# Patient Record
Sex: MALE | Race: WHITE | Employment: OTHER | URBAN - METROPOLITAN AREA
[De-identification: names, ages, dates, MRNs, and addresses within clinical notes are randomized per-mention and may not be internally consistent; named-entity substitution may affect disease eponyms.]

---

## 2019-02-15 ENCOUNTER — HOSPITAL ENCOUNTER (OUTPATIENT)
Age: 84
Discharge: HOME | End: 2019-02-17
Payer: COMMERCIAL

## 2019-02-15 LAB
ALBUMIN SERPL-MCNC: 3.6 G/DL (ref 3.5–5.2)
ALP BLD-CCNC: 80 U/L (ref 40–129)
ALT SERPL-CCNC: 7 U/L (ref 0–40)
ANION GAP SERPL CALCULATED.3IONS-SCNC: 7 MMOL/L (ref 7–16)
AST SERPL-CCNC: 14 U/L (ref 0–39)
BASOPHILS ABSOLUTE: 0 E9/L (ref 0–0.2)
BASOPHILS RELATIVE PERCENT: 0.7 % (ref 0–2)
BILIRUB SERPL-MCNC: 0.4 MG/DL (ref 0–1.2)
BUN BLDV-MCNC: 18 MG/DL (ref 8–23)
BURR CELLS: ABNORMAL
CALCIUM SERPL-MCNC: 9 MG/DL (ref 8.6–10.2)
CHLORIDE BLD-SCNC: 100 MMOL/L (ref 98–107)
CO2: 36 MMOL/L (ref 22–29)
CREAT SERPL-MCNC: 0.9 MG/DL (ref 0.7–1.2)
EOSINOPHILS ABSOLUTE: 0.48 E9/L (ref 0.05–0.5)
EOSINOPHILS RELATIVE PERCENT: 7 % (ref 0–6)
GFR AFRICAN AMERICAN: >60
GFR NON-AFRICAN AMERICAN: >60 ML/MIN/1.73
GLUCOSE BLD-MCNC: 147 MG/DL (ref 74–99)
HCT VFR BLD CALC: 33.1 % (ref 37–54)
HEMOGLOBIN: 9.5 G/DL (ref 12.5–16.5)
HYPOCHROMIA: ABNORMAL
LYMPHOCYTES ABSOLUTE: 0.61 E9/L (ref 1.5–4)
LYMPHOCYTES RELATIVE PERCENT: 8.8 % (ref 20–42)
MCH RBC QN AUTO: 25.5 PG (ref 26–35)
MCHC RBC AUTO-ENTMCNC: 28.7 % (ref 32–34.5)
MCV RBC AUTO: 88.7 FL (ref 80–99.9)
MONOCYTES ABSOLUTE: 0.75 E9/L (ref 0.1–0.95)
MONOCYTES RELATIVE PERCENT: 10.5 % (ref 2–12)
NEUTROPHILS ABSOLUTE: 5.03 E9/L (ref 1.8–7.3)
NEUTROPHILS RELATIVE PERCENT: 73.7 % (ref 43–80)
OVALOCYTES: ABNORMAL
PDW BLD-RTO: 18.8 FL (ref 11.5–15)
PLATELET # BLD: 245 E9/L (ref 130–450)
PMV BLD AUTO: 9.5 FL (ref 7–12)
POIKILOCYTES: ABNORMAL
POLYCHROMASIA: ABNORMAL
POTASSIUM SERPL-SCNC: 4.3 MMOL/L (ref 3.5–5)
RBC # BLD: 3.73 E12/L (ref 3.8–5.8)
SCHISTOCYTES: ABNORMAL
SODIUM BLD-SCNC: 143 MMOL/L (ref 132–146)
TOTAL PROTEIN: 7 G/DL (ref 6.4–8.3)
WBC # BLD: 6.8 E9/L (ref 4.5–11.5)

## 2019-02-15 PROCEDURE — 83540 ASSAY OF IRON: CPT

## 2019-02-15 PROCEDURE — 85025 COMPLETE CBC W/AUTO DIFF WBC: CPT

## 2019-02-15 PROCEDURE — 80053 COMPREHEN METABOLIC PANEL: CPT

## 2019-02-19 LAB — IRON: 34 MCG/DL (ref 59–158)

## 2019-05-08 ENCOUNTER — HOSPITAL ENCOUNTER (OUTPATIENT)
Dept: WOUND CARE | Age: 84
Discharge: HOME | End: 2019-05-08
Payer: COMMERCIAL

## 2019-05-08 VITALS
SYSTOLIC BLOOD PRESSURE: 150 MMHG | TEMPERATURE: 98 F | WEIGHT: 208 LBS | HEIGHT: 69 IN | RESPIRATION RATE: 18 BRPM | BODY MASS INDEX: 30.81 KG/M2 | DIASTOLIC BLOOD PRESSURE: 68 MMHG | HEART RATE: 58 BPM

## 2019-05-08 DIAGNOSIS — S30.0XXS TRAUMATIC HEMATOMA OF LOWER BACK, SEQUELA: Primary | Chronic | ICD-10-CM

## 2019-05-08 DIAGNOSIS — L98.422 SKIN ULCER OF BACK WITH FAT LAYER EXPOSED (HCC): Chronic | ICD-10-CM

## 2019-05-08 PROBLEM — S30.0XXA TRAUMATIC HEMATOMA OF LOWER BACK: Chronic | Status: ACTIVE | Noted: 2019-05-08

## 2019-05-08 PROCEDURE — 87077 CULTURE AEROBIC IDENTIFY: CPT

## 2019-05-08 PROCEDURE — 87075 CULTR BACTERIA EXCEPT BLOOD: CPT

## 2019-05-08 PROCEDURE — 87070 CULTURE OTHR SPECIMN AEROBIC: CPT

## 2019-05-08 PROCEDURE — 11042 DBRDMT SUBQ TIS 1ST 20SQCM/<: CPT | Performed by: SURGERY

## 2019-05-08 PROCEDURE — 87186 SC STD MICRODIL/AGAR DIL: CPT

## 2019-05-08 PROCEDURE — 11042 DBRDMT SUBQ TIS 1ST 20SQCM/<: CPT

## 2019-05-08 PROCEDURE — 99203 OFFICE O/P NEW LOW 30 MIN: CPT | Performed by: SURGERY

## 2019-05-08 PROCEDURE — 99213 OFFICE O/P EST LOW 20 MIN: CPT

## 2019-05-08 RX ORDER — BUDESONIDE 0.5 MG/2ML
1 INHALANT ORAL 2 TIMES DAILY
COMMUNITY
End: 2019-08-20 | Stop reason: SDUPTHER

## 2019-05-08 RX ORDER — PANTOPRAZOLE SODIUM 40 MG/1
40 GRANULE, DELAYED RELEASE ORAL
COMMUNITY

## 2019-05-08 RX ORDER — METOPROLOL TARTRATE 100 MG/1
50 TABLET ORAL 2 TIMES DAILY
COMMUNITY

## 2019-05-08 RX ORDER — ATORVASTATIN CALCIUM 10 MG/1
10 TABLET, FILM COATED ORAL DAILY
COMMUNITY

## 2019-05-08 RX ORDER — LIDOCAINE HYDROCHLORIDE 20 MG/ML
JELLY TOPICAL ONCE
Status: DISCONTINUED | OUTPATIENT
Start: 2019-05-08 | End: 2019-05-09 | Stop reason: HOSPADM

## 2019-05-08 RX ORDER — LANOLIN ALCOHOL/MO/W.PET/CERES
3 CREAM (GRAM) TOPICAL NIGHTLY PRN
COMMUNITY

## 2019-05-08 RX ORDER — IPRATROPIUM BROMIDE AND ALBUTEROL SULFATE 2.5; .5 MG/3ML; MG/3ML
1 SOLUTION RESPIRATORY (INHALATION) 3 TIMES DAILY
COMMUNITY
End: 2020-02-11

## 2019-05-08 SDOH — HEALTH STABILITY: MENTAL HEALTH: HOW OFTEN DO YOU HAVE A DRINK CONTAINING ALCOHOL?: NEVER

## 2019-05-08 NOTE — PROGRESS NOTES
Wound Healing Center /Hyperbarics   History and Physical/Consultation  Vascular    Referring Physician : Val Rodriguez MD  [address] [place] [address] RECORD NUMBER:  [id number]  AGE: 80 y.o. GENDER: male  : 1931  EPISODE DATE:  2019  Subjective:     Chief Complaint   Patient presents with    Wound Check     mid back         HISTORY of PRESENT ILLNESS HPI     Abi Frank is a 80 y.o. male who presents today for wound/ulcer evaluation. History of Wound Context:  The patient has had a wound of back which was first noted approximately 2019. This has been treated local wound care at facility he lives in. It occurred after he fell. He was noted to have a hematoma and extensive ecchymosis initially. He is chronically on eliquis for hx of afib. He denies previous hx of similar wounds. He currently lives in assisted living at a facility. On their initial visit to the wound healing center, 19, the patient has noted that the wound has not been improving. The patient has not had similar previous wounds in the past.      Pt is currently not on abx.       Wound/Ulcer Pain Timing/Severity: none  Quality of pain: N/A  Severity:  0 / 10   Modifying Factors: None  Associated Signs/Symptoms: none    Ulcer Identification:  Ulcer Type: diabetic, pressure and traumatic  Contributing Factors: diabetes, chronic pressure, shear force and malnutrition    Diabetic/Pressure/Non Pressure Ulcers only:  Ulcer: Non-Pressure ulcer, fat layer exposed  Wound: Contusion        PAST MEDICAL HISTORY      Diagnosis Date    Atherosclerotic heart disease     Atrial fibrillation (HCC)     CAD (coronary artery disease)     COPD (chronic obstructive pulmonary disease) (HCC)     Diabetes mellitus ([address]     Hyperlipidemia     Hypertension     Major depressive disorder     Pulmonary hypertension ([address]     Skin ulcer of back with fat layer exposed ([address] 2019    Traumatic hematoma of lower back 2019 Rest Pain   Eyes Neurologic   [x] Wears Glasses/Contacts [] Stroke/TIA   [] Vision Changes [] Focal weakness   Respiratory [] Slurred Speech    [] Shortness of breath ENT   Cardiovascular [] Difficulty swallowing   [] Chest Pain Gastrointestinal   [x] Shortness of breath with exertion [] Abdominal Pain    [] Melena       [] Hematochezia               Objective:    BP (!) 150/68   Pulse 58   Temp 98 °F (36.7 °C) (Oral)   Resp 18   Ht 5' 9\" (1.753 m)   Wt 208 lb (94.3 kg)   BMI 30.72 kg/m²   Wt Readings from Last 3 Encounters:   05/08/19 208 lb (94.3 kg)     PHYSICAL EXAM  CONSTITUTIONAL:   Awake, alert, cooperative   PSYCHIATRIC :  Oriented to time, place and person      normal insight to disease process  ENT:  External ears and nose without lesions    Hearing deficits is not noted  NECK: Supple, symmetrical, trachea midline    Thyroid goiter not appreciated    Carotid bruit is not noted bilaterally  LUNGS:  No increased work of breathing                  Clear to auscultation bilaterally   CARDIOVASCULAR:  irregular rate and rhythm   ABDOMEN:  soft, non-distended, non-tender    Hernias is not noted   Aorta is not palpable   Lymphatics : Cervical lymphadenopathy is not noted   SKIN:   Skin color is abnormal with open wound of back   Texture and turgor is  normal   Induration is not noted  EXTREMITIES:   R UE Edema is not noted  L UE Edema is not noted  R LE Edema is  noted  L LE Edema is  noted    Assessment:     Problem List Items Addressed This Visit     Skin ulcer of back with fat layer exposed ([address] (Chronic)    Relevant Orders    Prealbumin    Comprehensive Metabolic Panel    Sedimentation rate, manual    C-Reactive Protein    Hemoglobin A1C    CBC with Differential    Traumatic hematoma of lower back - Primary (Chronic)        Procedure Note  Indications:  Based on my examination of this patient's wound(s)/ulcer(s) today, debridement is required to promote healing and evaluate the wound base.     Performed by: Darnell Banerjee MD    Consent obtained:  Yes    Time out taken:  Yes    Pain Control: Anesthetic  Anesthetic: 2% Lidocaine Gel Topical     Debridement:Excisional Debridement    Using curette the wound(s)/ulcer(s) was/were sharply debrided down through and including the removal of epidermis, dermis and subcutaneous tissue. Devitalized Tissue Debrided:  fibrin, biofilm, slough and exudate    Pre Debridement Measurements:  Are located in the [facility]  Documentation Flow Sheet    Wound/Ulcer #: 1    Post Debridement Measurements:  Wound/Ulcer Descriptions are Pre Debridement except measurements:    Wound 05/08/19 Back Mid;Lower #1 acquired 1/18/19 (Active)   Wound Image   5/8/2019 11:06 AM   Wound Length (cm) 2.4 cm 5/8/2019 11:06 AM   Wound Width (cm) 2 cm 5/8/2019 11:06 AM   Wound Depth (cm) 0.9 cm 5/8/2019 11:06 AM   Wound Surface Area (cm^2) 4.8 cm^2 5/8/2019 11:06 AM   Wound Volume (cm^3) 4.32 cm^3 5/8/2019 11:06 AM   Post-Procedure Length (cm) 2.6 cm 5/8/2019 11:50 AM   Post-Procedure Width (cm) 2.1 cm 5/8/2019 11:50 AM   Post-Procedure Depth (cm) 1.2 cm 5/8/2019 11:50 AM   Post-Procedure Surface Area (cm^2) 5.46 cm^2 5/8/2019 11:50 AM   Post-Procedure Volume (cm^3) 6.55 cm^3 5/8/2019 11:50 AM   Wound Assessment Light purple;Pink;Swelling;Red 5/8/2019 11:06 AM   Drainage Amount Moderate 5/8/2019 11:06 AM   Drainage Description Serosanguinous 5/8/2019 11:06 AM   Odor None 5/8/2019 11:06 AM   Omaira-wound Assessment Hyperpigmented;Red;Swelling 5/8/2019 11:06 AM   Number of days: 0       Percent of Wound/Ulcer Debrided: 100%    Total Surface Area Debrided:  4.8 sq cm     Estimated Blood Loss:  Minimal    Hemostasis Achieved:  by pressure    Procedural Pain:  0  / 10     Post Procedural Pain:  0 / 10     Response to treatment:  Well tolerated by patient. A culture was done.       Plan:     Pt is not a smoker   - Discussed relationship of smoking and negative affects on wound healing   - Emphasized importance of tobacco avoidace/cessation    Emphasized importance of nutrition, supplementation protein shakes    In my professional opinion and based off the information that is available at this time this patient has appropriate indication for HBO Therapy: No    Treatment Note please see attached Discharge Instructions    Written patient dismissal instructions given to patient and signed by patient or POA. Discharge Instructions       Visit Discharge/Physician Orders    Discharge condition: Stable    Assessment of pain at discharge:yes    Anesthetic used: 2% lidocaine gel    Discharge to: Home    Left via: [address] by: daughter    ECF/HHA: [facility]    Dressing Orders: BACK-Cleanse with normal saline, pack loosely with aquacel ag (silver alginate). Pad well and dry dressing. Change daily. Treatment Orders:Eat a diet high in protein and vitamin C. Take a multiple vitamin daily unless contraindicated. Javi protein shakes 2/day. Keep all pressure off of wound site    Tissue culture obtained at [facility] today 5-8-19    Labs- Being drawn at [facility]-CBC/diff, CMP, Prealbumin, SED, RATE CRP, HgbA1c    [facility] followup visit 1 week_____________________________  (Please note your next appointment above and if you are unable to keep, kindly give a 24 hour notice. Thank you.)    Physician signature:__________________________      If you experience any of the following, please call the [address] [place] [address] Road during business hours:    * Increase in Pain  * Temperature over 101  * Increase in drainage from your wound  * Drainage with a foul odor  * Bleeding  * Increase in swelling  * Need for compression bandage changes due to slippage, breakthrough drainage. If you need medical attention outside of the business hours of the [address] [place] [address] Road please contact your PCP or go to the nearest emergency room.         Electronically signed by Sue Clifton MD on 5/8/2019 at 12:07 PM

## 2019-05-10 LAB
ANAEROBIC CULTURE: NORMAL
ORGANISM: ABNORMAL
WOUND/ABSCESS: ABNORMAL
WOUND/ABSCESS: ABNORMAL

## 2019-05-15 ENCOUNTER — HOSPITAL ENCOUNTER (OUTPATIENT)
Dept: WOUND CARE | Age: 84
Discharge: HOME | End: 2019-05-15
Payer: COMMERCIAL

## 2019-05-15 VITALS
TEMPERATURE: 98.3 F | HEART RATE: 80 BPM | RESPIRATION RATE: 18 BRPM | SYSTOLIC BLOOD PRESSURE: 136 MMHG | DIASTOLIC BLOOD PRESSURE: 70 MMHG

## 2019-05-15 DIAGNOSIS — L98.422 SKIN ULCER OF BACK WITH FAT LAYER EXPOSED (HCC): Chronic | ICD-10-CM

## 2019-05-15 DIAGNOSIS — S30.0XXS TRAUMATIC HEMATOMA OF LOWER BACK, SEQUELA: Primary | Chronic | ICD-10-CM

## 2019-05-15 PROCEDURE — 11042 DBRDMT SUBQ TIS 1ST 20SQCM/<: CPT | Performed by: SURGERY

## 2019-05-15 PROCEDURE — 11042 DBRDMT SUBQ TIS 1ST 20SQCM/<: CPT

## 2019-05-15 RX ORDER — LIDOCAINE HYDROCHLORIDE 20 MG/ML
JELLY TOPICAL ONCE
Status: DISCONTINUED | OUTPATIENT
Start: 2019-05-15 | End: 2019-05-16 | Stop reason: HOSPADM

## 2019-05-15 NOTE — PROGRESS NOTES
Wound Healing Center Followup Visit Note    Referring Physician : Cecy Mary MD  [address] [place] [address] RECORD NUMBER:  [id number]  AGE: 80 y.o. GENDER: male  : 1931  EPISODE DATE:  5/15/2019    Subjective:     Chief Complaint   Patient presents with    Wound Check      HISTORY of PRESENT ILLNESS HPI   Chris Mayorga is a 80 y.o. male who presents today in regards to follow up evaluation and treatment of wound/ulcer. That patient's past medical, family and social hx were reviewed and changes were made if present. History of Wound Context:  The patient has had a wound of back which was first noted approximately 2019. This has been treated local wound care at facility he lives in. It occurred after he fell. He was noted to have a hematoma and extensive ecchymosis initially. He is chronically on eliquis for hx of afib. He denies previous hx of similar wounds. He currently lives in assisted living at a facility.       On their initial visit to the wound healing center, 19, the patient has noted that the wound has not been improving.   The patient has not had similar previous wounds in the past.       Pt is currently not on abx.    5/15/19  · Wound improved  · Continue aquacell      Wound/Ulcer Pain Timing/Severity: none  Quality of pain: N/A  Severity:  0 / 10   Modifying Factors: None  Associated Signs/Symptoms: none     Ulcer Identification:  Ulcer Type: diabetic, pressure and traumatic  Contributing Factors: diabetes, chronic pressure, shear force and malnutrition     Diabetic/Pressure/Non Pressure Ulcers only:  Ulcer: Non-Pressure ulcer, fat layer exposed  Wound: Contusion        PAST MEDICAL HISTORY      Diagnosis Date    Atherosclerotic heart disease     Atrial fibrillation (HCC)     CAD (coronary artery disease)     COPD (chronic obstructive pulmonary disease) (HCC)     [facility] mellitus [facility]     Hyperlipidemia     Hypertension     Major depressive disorder     [facility]     Skin ulcer of back with fat layer exposed ([facility] 2019    Traumatic hematoma of lower back 2019     Past Surgical History:   Procedure Laterality Date    ABDOMINAL AORTIC ANEURYSM REPAIR      BRAIN SURGERY      CORONARY ARTERY BYPASS GRAFT      JOINT REPLACEMENT      KNEE SURGERY      PACEMAKER INSERTION      PACEMAKER PLACEMENT       No family history on file. Social History     Tobacco Use    Smoking status: Former Smoker     Types: Cigarettes     Last attempt to quit: 1974     Years since quittin.8    Smokeless tobacco: Never Used   Substance Use Topics    Alcohol use: Never     Frequency: Never    Drug use: Never     No Known Allergies  Current Outpatient Medications on File Prior to Encounter   Medication Sig Dispense Refill    pantoprazole sodium (PROTONIX) 40 MG PACK packet Take 40 mg by mouth every morning (before breakfast)      budesonide (PULMICORT) 0.5 MG/2ML nebulizer suspension Take 1 ampule by nebulization 2 times daily      apixaban (ELIQUIS) 5 MG TABS tablet Take 5 mg by mouth 2 times daily      metoprolol (LOPRESSOR) 100 MG tablet Take 100 mg by mouth 2 times daily      insulin 70-30 (NOVOLIN 70/30) (70-30) 100 UNIT per ML injection vial Inject 10 Units into the skin 2 times daily      ipratropium-albuterol (DUONEB) 0.5-2.5 (3) MG/3ML SOLN nebulizer solution Inhale 1 vial into the lungs three times daily      fluticasone (VERAMYST) 27.5 MCG/SPRAY nasal spray 1 spray by Nasal route daily      melatonin 3 MG TABS tablet Take 3 mg by mouth nightly as needed      atorvastatin (LIPITOR) 10 MG tablet Take 10 mg by mouth daily      METFORMIN HCL PO Take 500 mg by mouth       Furosemide (LASIX PO) Take 20 mg by mouth       LISINOPRIL PO Take 20 mg by mouth        No current facility-administered medications on file prior to encounter.         REVIEW OF SYSTEMS See HPI    Objective:    /70   Pulse 80   Temp 98.3 °F (36.8 °C) (Oral)   Resp 18   Wt Readings from Last 3 Encounters:   05/08/19 208 lb (94.3 kg)     PHYSICAL EXAM  CONSTITUTIONAL:   Awake, alert, cooperative   EYES:  lids and lashes normal   ENT: external ears and nose without lesions   NECK:  supple, symmetrical, trachea midline   SKIN:  Open wound Present    Assessment:     Problem List Items Addressed This Visit     Skin ulcer of back with fat layer exposed ([address] (Chronic)    Traumatic hematoma of lower back - Primary (Chronic)        Procedure Note  Indications:  Based on my examination of this patient's wound(s)/ulcer(s) today, debridement is required to promote healing and evaluate the wound base. Performed by: Julio Vega MD    Consent obtained:  Yes    Time out taken:  Yes    Pain Control:       Debridement:Excisional Debridement    Using curette the wound(s)/ulcer(s) was/were sharply debrided down through and including the removal of epidermis, dermis and subcutaneous tissue.         Devitalized Tissue Debrided:  fibrin, biofilm, slough and exudate to stimulate bleeding to promote healing, post debridement good bleeding base and wound edges noted    Pre Debridement Measurements:  Are located in the [facility]  Documentation Flow Sheet    Wound/Ulcer #: 1    Post Debridement Measurements:  Wound/Ulcer Descriptions are Pre Debridement except measurements:    Wound 05/08/19 Back Mid;Lower #1 acquired 1/18/19 (Active)   Wound Image   5/8/2019 11:06 AM   Wound Traumatic 5/8/2019 11:06 AM   Dressing Status Intact 5/15/2019  9:55 AM   Dressing Changed Changed/New 5/15/2019  9:55 AM   Dressing/Treatment Alginate with Ag;Dry Dressing 5/8/2019 12:16 PM   Wound Cleansed Rinsed/Irrigated with saline 5/15/2019  9:55 AM   Wound Length (cm) 2.1 cm 5/15/2019  9:55 AM   Wound Width (cm) 1.4 cm 5/15/2019  9:55 AM   Wound Depth (cm) 0.7 cm 5/15/2019  9:55 AM   Wound Surface Area (cm^2) 2.94 cm^2 5/15/2019  9:55 AM   Change in Wound Size % (l*w) 38.75 5/15/2019  9:55 AM Wound Volume (cm^3) 2.06 cm^3 5/15/2019  9:55 AM   Wound Healing % 52 5/15/2019  9:55 AM   Post-Procedure Length (cm) 2.2 cm 5/15/2019 10:41 AM   Post-Procedure Width (cm) 1.4 cm 5/15/2019 10:41 AM   Post-Procedure Depth (cm) 0.8 cm 5/15/2019 10:41 AM   Post-Procedure Surface Area (cm^2) 3.08 cm^2 5/15/2019 10:41 AM   Post-Procedure Volume (cm^3) 2.46 cm^3 5/15/2019 10:41 AM   Undermining Starts ___ O'Clock 11 5/15/2019  9:55 AM   Undermining Ends___ O'Clock 12 5/15/2019  9:55 AM   Undermining Maxium Distance (cm) 0.4 5/15/2019  9:55 AM   Wound Assessment Slough; Yellow 5/15/2019  9:55 AM   Drainage Amount Moderate 5/15/2019  9:55 AM   Drainage Description Serosanguinous; Yellow 5/15/2019  9:55 AM   Odor None 5/15/2019  9:55 AM   Omaira-wound Assessment Pink;Red 5/15/2019  9:55 AM   Number of days: 6     Percent of Wound/Ulcer Debrided: 100%    Total Surface Area Debrided:  2.94 sq cm     Estimated Blood Loss:  Minimal  Hemostasis Achieved:  by pressure    Procedural Pain:  0  / 10   Post Procedural Pain:  0 / 10     Response to treatment:  Well tolerated by patient. Plan:   Treatment Note please see attached Discharge Instructions    Written patient dismissal instructions given to patient and signed by patient or POA. Discharge Instructions       Visit Discharge/Physician Orders     Discharge condition: Stable     Assessment of pain at discharge:yes     Anesthetic used: 2% lidocaine gel     Discharge to: Home     Left via: [address] by: self     ECF/HHA: [facility]     Dressing Orders: BACK-Cleanse with normal saline, pack loosely with aquacel ag (silver alginate). Pad well and dry dressing. Change daily.     Treatment Orders:Eat a diet high in protein and vitamin C.  Take a multiple vitamin daily unless contraindicated.     Javi protein shakes 2/day.     Keep all pressure off of wound site     Tissue culture obtained at [facility] 5-8-19     Labs- Being drawn at [facility] clinic-CBC/diff, CMP, Prealbumin, SED, RATE CRP, HgbA1c     [facility] followup visit 1 week_____________________________  (Please note your next appointment above and if you are unable to keep, kindly give a 24 hour notice.  Thank you.)     Physician signature:__________________________        If you experience any of the following, please call the [facility] during business hours:     * Increase in Pain  * Temperature over 101  * Increase in drainage from your wound  * Drainage with a foul odor  * Bleeding  * Increase in swelling  * Need for compression bandage changes due to slippage, breakthrough drainage.     If you need medical attention outside of the business hours of the [facility] please contact your PCP or go to the nearest emergency room.                 Electronically signed by Olga Linton MD on 5/15/2019 at 10:42 AM

## 2019-05-22 ENCOUNTER — HOSPITAL ENCOUNTER (OUTPATIENT)
Dept: WOUND CARE | Age: 84
Discharge: HOME | End: 2019-05-22
Payer: COMMERCIAL

## 2019-05-22 VITALS
HEART RATE: 60 BPM | DIASTOLIC BLOOD PRESSURE: 64 MMHG | WEIGHT: 208 LBS | TEMPERATURE: 97.8 F | SYSTOLIC BLOOD PRESSURE: 112 MMHG | BODY MASS INDEX: 30.81 KG/M2 | HEIGHT: 69 IN | RESPIRATION RATE: 18 BRPM

## 2019-05-22 DIAGNOSIS — L98.422 SKIN ULCER OF BACK WITH FAT LAYER EXPOSED (HCC): ICD-10-CM

## 2019-05-22 PROCEDURE — 11042 DBRDMT SUBQ TIS 1ST 20SQCM/<: CPT

## 2019-05-22 PROCEDURE — 11042 DBRDMT SUBQ TIS 1ST 20SQCM/<: CPT | Performed by: SURGERY

## 2019-05-22 RX ORDER — LIDOCAINE HYDROCHLORIDE 20 MG/ML
JELLY TOPICAL ONCE
Status: DISCONTINUED | OUTPATIENT
Start: 2019-05-22 | End: 2019-05-23 | Stop reason: HOSPADM

## 2019-05-29 ENCOUNTER — HOSPITAL ENCOUNTER (OUTPATIENT)
Dept: WOUND CARE | Age: 84
Discharge: HOME | End: 2019-05-29
Payer: COMMERCIAL

## 2019-05-29 NOTE — PROGRESS NOTES
Wound Healing Center Followup Visit Note    Referring Physician : Mahsa Che MD  [address] [place] [address] RECORD NUMBER:  [id number]  AGE: 80 y.o. GENDER: male  : 1931  EPISODE DATE:  2019    Subjective:     Chief Complaint   Patient presents with    Wound Check     back      HISTORY of PRESENT ILLNESS HPI   Romain Davison is a 80 y.o. male who presents today in regards to follow up evaluation and treatment of wound/ulcer. That patient's past medical, family and social hx were reviewed and changes were made if present.     History of Wound Context:  The patient has had a wound of back which was first noted approximately 2019.  This has been treated local wound care at facility he lives in.  It occurred after he fell. Liseth Peñaloza was noted to have a hematoma and extensive ecchymosis initially. Liseth Peñaloza is chronically on eliquis for hx of afib.  He denies previous hx of similar wounds. Liseth Peñaloza currently lives in assisted living at a facility.       On their initial visit to the wound healing center, 19, the patient has noted that the wound has not been improving.  The patient has not had similar previous wounds in the past.       Pt is currently not on abx.     5/15/19  · Wound improved  · Continue aquacell  19  · Wound improved     Wound/Ulcer Pain Timing/Severity: none  Quality of pain: N/A  Severity:  0 / 10   Modifying Factors: None  Associated Signs/Symptoms: none     Ulcer Identification:  Ulcer Type: diabetic, pressure and traumatic  Contributing Factors: diabetes, chronic pressure, shear force and malnutrition     Diabetic/Pressure/Non Pressure Ulcers only:  Ulcer: Non-Pressure ulcer, fat layer exposed  Wound: Contusion    PAST MEDICAL HISTORY      Diagnosis Date    Atherosclerotic heart disease     [facility]     CAD (coronary artery disease)     COPD (chronic obstructive pulmonary disease) ([facility]     Diabetes mellitus ([facility]     Hyperlipidemia     Hypertension     Major depressive disorder     Pulmonary hypertension [facility]     Skin ulcer of back with fat layer exposed ([facility] 2019    Traumatic hematoma of lower back 2019     Past Surgical History:   Procedure Laterality Date    ABDOMINAL AORTIC ANEURYSM REPAIR      BRAIN SURGERY      CORONARY ARTERY BYPASS GRAFT      JOINT REPLACEMENT      KNEE SURGERY      PACEMAKER INSERTION      PACEMAKER PLACEMENT       History reviewed. No pertinent family history. Social History     Tobacco Use    Smoking status: Former Smoker     Types: Cigarettes     Last attempt to quit: 1974     Years since quittin.8    Smokeless tobacco: Never Used   Substance Use Topics    Alcohol use: Never     Frequency: Never    Drug use: Never     No Known Allergies  Current Outpatient Medications on File Prior to Encounter   Medication Sig Dispense Refill    pantoprazole sodium (PROTONIX) 40 MG PACK packet Take 40 mg by mouth every morning (before breakfast)      budesonide (PULMICORT) 0.5 MG/2ML nebulizer suspension Take 1 ampule by nebulization 2 times daily      apixaban (ELIQUIS) 5 MG TABS tablet Take 5 mg by mouth 2 times daily      metoprolol (LOPRESSOR) 100 MG tablet Take 100 mg by mouth 2 times daily      insulin 70-30 (NOVOLIN 70/30) (70-30) 100 UNIT per ML injection vial Inject 10 Units into the skin 2 times daily      ipratropium-albuterol (DUONEB) 0.5-2.5 (3) MG/3ML SOLN nebulizer solution Inhale 1 vial into the lungs three times daily      fluticasone (VERAMYST) 27.5 MCG/SPRAY nasal spray 1 spray by Nasal route daily      melatonin 3 MG TABS tablet Take 3 mg by mouth nightly as needed      atorvastatin (LIPITOR) 10 MG tablet Take 10 mg by mouth daily      METFORMIN HCL PO Take 500 mg by mouth       Furosemide (LASIX PO) Take 20 mg by mouth       LISINOPRIL PO Take 20 mg by mouth        No current facility-administered medications on file prior to encounter.         REVIEW OF SYSTEMS See HPI    Objective:    BP cm 5/22/2019  9:32 AM   Wound Surface Area (cm^2) 1.7 cm^2 5/22/2019  9:32 AM   Change in Wound Size % (l*w) 64.58 5/22/2019  9:32 AM   Wound Volume (cm^3) 0.85 cm^3 5/22/2019  9:32 AM   Wound Healing % 80 5/22/2019  9:32 AM   Post-Procedure Length (cm) 1.8 cm 5/22/2019  9:45 AM   Post-Procedure Width (cm) 1.1 cm 5/22/2019  9:45 AM   Post-Procedure Depth (cm) 0.6 cm 5/22/2019  9:45 AM   Post-Procedure Surface Area (cm^2) 1.98 cm^2 5/22/2019  9:45 AM   Post-Procedure Volume (cm^3) 1.19 cm^3 5/22/2019  9:45 AM   Undermining Starts ___ O'Clock 11 5/15/2019  9:55 AM   Undermining Ends___ O'Clock 12 5/15/2019  9:55 AM   Undermining Maxium Distance (cm) 0.4 5/15/2019  9:55 AM   Wound Assessment Pink;Yellow 5/22/2019  9:32 AM   Drainage Amount Moderate 5/22/2019  9:32 AM   Drainage Description Whitney;Yellow 5/22/2019  9:32 AM   Odor None 5/22/2019  9:32 AM   Omaira-wound Assessment Maceration; White 5/22/2019  9:32 AM   Number of days: 20     Percent of Wound/Ulcer Debrided: 100%    Total Surface Area Debrided:  1.7 sq cm     Estimated Blood Loss:  Minimal  Hemostasis Achieved:  by pressure    Procedural Pain:  2  / 10   Post Procedural Pain:  1 / 10     Response to treatment:  Well tolerated by patient. Plan:   Treatment Note please see attached Discharge Instructions    Written patient dismissal instructions given to patient and signed by patient or POA. Discharge Instructions       Visit Discharge/Physician Orders     Discharge condition: Stable     Assessment of pain at discharge:yes     Anesthetic used: 2% lidocaine gel     Discharge to: Home     Left via: [address] by: self     ECF/HHA: [facility]     Dressing Orders: BACK-Cleanse with normal saline, pack loosely with aquacel ag (silver alginate). Pad well and dry dressing. Change daily.     Treatment Orders:Eat a diet high in protein and vitamin C.  Take a multiple vitamin daily unless contraindicated.     Javi protein sheila 2/day.     Keep all pressure off of wound site      [address] [facility] followup visit 1 week_____________________________  (Please note your next appointment above and if you are unable to keep, kindly give a 24 hour notice.  Thank you.)     Physician signature:__________________________        If you experience any of the following, please call the [facility] during business hours:     * Increase in Pain  * Temperature over 101  * Increase in drainage from your wound  * Drainage with a foul odor  * Bleeding  * Increase in swelling  * Need for compression bandage changes due to slippage, breakthrough drainage.     If you need medical attention outside of the business hours of the [facility] please contact your PCP or go to the nearest emergency room.                 Electronically signed by Te Cortes MD

## 2019-06-04 PROBLEM — J43.9 PULMONARY EMPHYSEMA (HCC): Status: ACTIVE | Noted: 2019-06-04

## 2019-06-04 PROBLEM — S30.0XXA TRAUMATIC HEMATOMA OF LOWER BACK: Chronic | Status: RESOLVED | Noted: 2019-05-08 | Resolved: 2019-06-04

## 2019-06-04 PROBLEM — I25.10 CORONARY ARTERY DISEASE INVOLVING NATIVE CORONARY ARTERY OF NATIVE HEART WITHOUT ANGINA PECTORIS: Status: ACTIVE | Noted: 2019-06-04

## 2019-06-04 PROBLEM — Z79.4 TYPE 2 DIABETES MELLITUS WITH DIABETIC POLYNEUROPATHY, WITH LONG-TERM CURRENT USE OF INSULIN (HCC): Status: ACTIVE | Noted: 2019-06-04

## 2019-06-04 PROBLEM — J96.11 CHRONIC RESPIRATORY FAILURE WITH HYPOXIA (HCC): Status: ACTIVE | Noted: 2019-06-04

## 2019-06-04 PROBLEM — Z95.0 PACEMAKER: Status: ACTIVE | Noted: 2019-06-04

## 2019-06-04 PROBLEM — E11.42 TYPE 2 DIABETES MELLITUS WITH DIABETIC POLYNEUROPATHY, WITH LONG-TERM CURRENT USE OF INSULIN (HCC): Status: ACTIVE | Noted: 2019-06-04

## 2019-06-04 PROBLEM — I48.0 PAROXYSMAL ATRIAL FIBRILLATION (HCC): Status: ACTIVE | Noted: 2019-06-04

## 2019-06-05 ENCOUNTER — HOSPITAL ENCOUNTER (OUTPATIENT)
Dept: WOUND CARE | Age: 84
Discharge: HOME | End: 2019-06-05
Payer: COMMERCIAL

## 2019-06-05 VITALS
RESPIRATION RATE: 20 BRPM | SYSTOLIC BLOOD PRESSURE: 130 MMHG | HEART RATE: 70 BPM | TEMPERATURE: 97.6 F | DIASTOLIC BLOOD PRESSURE: 50 MMHG

## 2019-06-05 DIAGNOSIS — L98.422 SKIN ULCER OF BACK WITH FAT LAYER EXPOSED (HCC): ICD-10-CM

## 2019-06-05 PROCEDURE — 97597 DBRDMT OPN WND 1ST 20 CM/<: CPT | Performed by: SURGERY

## 2019-06-05 PROCEDURE — 97597 DBRDMT OPN WND 1ST 20 CM/<: CPT

## 2019-06-05 RX ORDER — LIDOCAINE HYDROCHLORIDE 20 MG/ML
JELLY TOPICAL ONCE
Status: DISCONTINUED | OUTPATIENT
Start: 2019-06-05 | End: 2019-06-06 | Stop reason: HOSPADM

## 2019-06-12 ENCOUNTER — HOSPITAL ENCOUNTER (OUTPATIENT)
Dept: WOUND CARE | Age: 84
Discharge: HOME | End: 2019-06-12
Payer: COMMERCIAL

## 2019-06-12 VITALS
BODY MASS INDEX: 30.66 KG/M2 | RESPIRATION RATE: 20 BRPM | WEIGHT: 207 LBS | TEMPERATURE: 98.7 F | HEIGHT: 69 IN | HEART RATE: 82 BPM | DIASTOLIC BLOOD PRESSURE: 70 MMHG | SYSTOLIC BLOOD PRESSURE: 136 MMHG

## 2019-06-12 DIAGNOSIS — L98.421: Chronic | ICD-10-CM

## 2019-06-12 PROCEDURE — 97597 DBRDMT OPN WND 1ST 20 CM/<: CPT

## 2019-06-12 PROCEDURE — 97597 DBRDMT OPN WND 1ST 20 CM/<: CPT | Performed by: SURGERY

## 2019-06-12 RX ORDER — LIDOCAINE HYDROCHLORIDE 20 MG/ML
JELLY TOPICAL ONCE
Status: DISCONTINUED | OUTPATIENT
Start: 2019-06-12 | End: 2019-06-13 | Stop reason: HOSPADM

## 2019-06-12 NOTE — PROGRESS NOTES
Wound Healing Center Followup Visit Note    Referring Physician : Susanna Richards DO  [address]  RECORD NUMBER:  [id number]  AGE: 80 y.o. GENDER: male  : 1931  EPISODE DATE:  2019    Subjective:     Chief Complaint   Patient presents with    Wound Check     back       HISTORY of PRESENT ILLNESS HPI   Yusef Fan is a 80 y.o. male who presents today in regards to follow up evaluation and treatment of wound/ulcer. That patient's past medical, family and social hx were reviewed and changes were made if present.     History of Wound Context:  The patient has had a wound of back which was first noted approximately 2019.  This has been treated local wound care at facility he lives in.  It occurred after he fell. [facility] was noted to have a hematoma and extensive ecchymosis initially. [facility] is chronically on eliquis for hx of afib.  He denies previous hx of similar wounds. [facility] currently lives in assisted living at a facility.       On their initial visit to the wound healing center, 19, the patient has noted that the wound has not been improving.  The patient has not had similar previous wounds in the past.       Pt is currently not on abx.     5/15/19  · Wound improved  · Continue aquacell  19  · Wound improved  19  · Wound improved     Wound/Ulcer Pain Timing/Severity: none  Quality of pain: N/A  Severity:  0 / 10   Modifying Factors: None  Associated Signs/Symptoms: none     Ulcer Identification:  Ulcer Type: diabetic, pressure and traumatic  Contributing Factors: diabetes, chronic pressure, shear force and malnutrition     Diabetic/Pressure/Non Pressure Ulcers only:  Ulcer: Non-Pressure ulcer, fat layer exposed  Wound: Contusion        PAST MEDICAL HISTORY      Diagnosis Date    Atherosclerotic heart disease     Atrial [facility] ([facility]     CAD (coronary artery disease)     COPD (chronic obstructive pulmonary disease) ([facility]     Diabetes mellitus ([facility] [facility]     Hyperlipidemia  Hypertension     Major depressive disorder     Pulmonary hypertension ([facility] [address]     Skin ulcer of back with fat layer exposed ([facility] [address] 2019    Traumatic hematoma of lower back 2019     Past Surgical History:   Procedure Laterality Date    ABDOMINAL AORTIC ANEURYSM REPAIR      BRAIN SURGERY      CORONARY ARTERY BYPASS GRAFT      JOINT REPLACEMENT      KNEE SURGERY      PACEMAKER INSERTION      PACEMAKER PLACEMENT       History reviewed. No pertinent family history.   Social History     Tobacco Use    Smoking status: Former Smoker     Types: Cigarettes     Last attempt to quit: 1974     Years since quittin.8    Smokeless tobacco: Never Used   Substance Use Topics    Alcohol use: Never     Frequency: Never    Drug use: Never     No Known Allergies  Current Outpatient Medications on File Prior to Encounter   Medication Sig Dispense Refill    acetaminophen (TYLENOL) 325 MG tablet Take 650 mg by mouth every 4 hours as needed for Pain      Menthol, Topical Analgesic, (BIOFREEZE ROLL-ON) 4 % GEL Apply topically      Magnesium Hydroxide (MILK OF MAGNESIA PO) Take by mouth      Nutritional Supplements (ARGINAID) PACK Take by mouth 2 times daily      pantoprazole sodium (PROTONIX) 40 MG PACK packet Take 40 mg by mouth every morning (before breakfast)      budesonide (PULMICORT) 0.5 MG/2ML nebulizer suspension Take 1 ampule by nebulization 2 times daily      apixaban (ELIQUIS) 5 MG TABS tablet Take 5 mg by mouth 2 times daily      metoprolol (LOPRESSOR) 100 MG tablet Take 100 mg by mouth 2 times daily      insulin 70-30 (NOVOLIN 70/30) (70-30) 100 UNIT per ML injection vial Inject 10 Units into the skin 2 times daily      ipratropium-albuterol (DUONEB) 0.5-2.5 (3) MG/3ML SOLN nebulizer solution Inhale 1 vial into the lungs three times daily      melatonin 3 MG TABS tablet Take 3 mg by mouth nightly as needed      atorvastatin (LIPITOR) 10 MG tablet Take 10 mg by mouth daily      metFORMIN (GLUCOPHAGE) 500 MG tablet Take 500 mg by mouth 2 times daily       Furosemide (LASIX PO) Take 20 mg by mouth daily       lisinopril (PRINIVIL;ZESTRIL) 40 MG tablet Take 20 mg by mouth        No current facility-administered medications on file prior to encounter. REVIEW OF SYSTEMS See HPI    Objective:    BP (!) 130/50   Pulse 70   Temp 97.6 °F (36.4 °C) (Oral)   Resp 20   Wt Readings from Last 3 Encounters:   05/22/19 208 lb (94.3 kg)   05/08/19 208 lb (94.3 kg)     PHYSICAL EXAM  CONSTITUTIONAL:   Awake, alert, cooperative   EYES:  lids and lashes normal   ENT: external ears and nose without lesions   NECK:  supple, symmetrical, trachea midline   SKIN:  Open wound Present    Assessment:     Problem List Items Addressed This Visit     Skin ulcer of back with fat layer exposed ([facility] [address] (Chronic)        Procedure Note  Indications:  Based on my examination of this patient's wound(s)/ulcer(s) today, debridement is required to promote healing and evaluate the wound base. Performed by: Ashia Gamez MD    Consent obtained:  Yes    Time out taken:  Yes    Pain Control:       Debridement:Non-excisional Debridement    Using curette the wound(s)/ulcer(s) was/were sharply debrided down through and including the removal of epidermis and dermis. Devitalized Tissue Debrided:  fibrin, biofilm and exudate to stimulate bleeding to promote healing, post debridement good bleeding base and wound edges noted    Pre Debridement Measurements:  Are located in the [facility]  Documentation Flow Sheet    Wound/Ulcer #: 1    Post Debridement Measurements:  Wound/Ulcer Descriptions are Pre Debridement except measurements:    Wound 05/08/19 Back Mid;Lower #1 acquired 1/18/19 (Active)   Wound Image   5/8/2019 11:06 AM   Wound Traumatic 5/8/2019 11:06 AM   Dressing Status Clean;Dry; Intact 6/5/2019 10:57 AM   Dressing Changed Changed/New 6/5/2019 10:57 AM   Dressing/Treatment Alginate;Dry Dressing 6/5/2019 10:57 AM   Wound Cleansed Rinsed/Irrigated with saline; Wound cleanser 6/5/2019 10:57 AM   Wound Length (cm) 1 cm 6/5/2019  9:54 AM   Wound Width (cm) 0.8 cm 6/5/2019  9:54 AM   Wound Depth (cm) 0.3 cm 6/5/2019  9:54 AM   Wound Surface Area (cm^2) 0.8 cm^2 6/5/2019  9:54 AM   Change in Wound Size % (l*w) 83.33 6/5/2019  9:54 AM   Wound Volume (cm^3) 0.24 cm^3 6/5/2019  9:54 AM   Wound Healing % 94 6/5/2019  9:54 AM   Post-Procedure Length (cm) 1.2 cm 6/5/2019 10:52 AM   Post-Procedure Width (cm) 0.8 cm 6/5/2019 10:52 AM   Post-Procedure Depth (cm) 0.3 cm 6/5/2019 10:52 AM   Post-Procedure Surface Area (cm^2) 0.96 cm^2 6/5/2019 10:52 AM   Post-Procedure Volume (cm^3) 0.29 cm^3 6/5/2019 10:52 AM   Undermining Starts ___ O'Clock 11 5/15/2019  9:55 AM   Undermining Ends___ O'Clock 12 5/15/2019  9:55 AM   Undermining Maxium Distance (cm) 0.4 5/15/2019  9:55 AM   Wound Assessment Red;Granulation tissue; Yellow 6/5/2019  9:54 AM   Drainage Amount Moderate 6/5/2019  9:54 AM   Drainage Description Yellow 6/5/2019  9:54 AM   Odor None 6/5/2019  9:54 AM   Omaira-wound Assessment Pink 6/5/2019  9:54 AM   Number of days: 34     Percent of Wound/Ulcer Debrided: 100%    Total Surface Area Debrided:  0.8 sq cm     Estimated Blood Loss:  Minimal  Hemostasis Achieved:  by pressure    Procedural Pain:  2 / 10   Post Procedural Pain:  1 / 10     Response to treatment:  Well tolerated by patient. Plan:   Treatment Note please see attached Discharge Instructions    Written patient dismissal instructions given to patient and signed by patient or POA.          Discharge Instructions       Visit Discharge/Physician Orders     Discharge condition: Stable     Assessment of pain at discharge:yes     Anesthetic used: 2% lidocaine gel     Discharge to: Home     Left via: [address] by: self     ECF/HHA: [facility](note order change)     Dressing Orders: BACK-Cleanse with normal saline, pack loosely with

## 2019-06-12 NOTE — PROGRESS NOTES
Wound Healing Center Followup Visit Note    Referring Physician : Alicia Begum DO  [address]  RECORD NUMBER:  [id number]  AGE: 80 y.o. GENDER: male  : 1931  EPISODE DATE:  2019    Subjective:     Chief Complaint   Patient presents with    Wound Check     back      HISTORY of PRESENT ILLNESS HPI   Steele Baumgarten is a 80 y.o. male who presents today in regards to follow up evaluation and treatment of wound/ulcer. That patient's past medical, family and social hx were reviewed and changes were made if present.     History of Wound Context:  The patient has had a wound of back which was first noted approximately 2019.  This has been treated local wound care at facility he lives in.  It occurred after he fell. [facility] was noted to have a hematoma and extensive ecchymosis initially. [facility] is chronically on eliquis for hx of afib.  He denies previous hx of similar wounds. [facility] currently lives in assisted living at a facility.       On their initial visit to the wound healing center, 19, the patient has noted that the wound has not been improving.  The patient has not had similar previous wounds in the past.       Pt is currently not on abx.     5/15/19  · Wound improved  · Continue aquacell  19  · Wound improved  19  · Wound improved  19  · Partial  · Wound improved  · collagen     Wound/Ulcer Pain Timing/Severity: none  Quality of pain: N/A  Severity:  0 / 10   Modifying Factors: None  Associated Signs/Symptoms: none     Ulcer Identification:  Ulcer Type: diabetic, pressure and traumatic  Contributing Factors: diabetes, chronic pressure, shear force and malnutrition     Diabetic/Pressure/Non Pressure Ulcers only:  Ulcer: Non-Pressure ulcer, fat layer exposed  Wound: Contusion            PAST MEDICAL HISTORY      Diagnosis Date    Atherosclerotic heart disease     Atrial fibrillation ([facility] [address]     CAD (coronary artery disease)     COPD [facility]ch[facility] obstructive [facility]) ([facility]     Diabetes mellitus ([facility]     Hyperlipidemia     Hypertension     Major depressive disorder     Pulmonary hypertension ([facility]     Skin ulcer of back with fat layer exposed ([facility] 2019    Traumatic hematoma of lower back 2019     Past Surgical History:   Procedure Laterality Date    ABDOMINAL AORTIC ANEURYSM REPAIR      BRAIN SURGERY      CORONARY ARTERY BYPASS GRAFT      JOINT REPLACEMENT      KNEE SURGERY      PACEMAKER INSERTION      PACEMAKER PLACEMENT       History reviewed. No pertinent family history.   Social History     Tobacco Use    Smoking status: Former Smoker     Types: Cigarettes     Last attempt to quit: 1974     Years since quittin.8    Smokeless tobacco: Never Used   Substance Use Topics    Alcohol use: Never     Frequency: Never    Drug use: Never     No Known Allergies  Current Outpatient Medications on File Prior to Encounter   Medication Sig Dispense Refill    tamsulosin (FLOMAX) 0.4 MG capsule Take 1 capsule by mouth daily 30 capsule 5    acetaminophen (TYLENOL) 325 MG tablet Take 650 mg by mouth every 4 hours as needed for Pain      Menthol, Topical Analgesic, (BIOFREEZE ROLL-ON) 4 % GEL Apply topically      Magnesium Hydroxide (MILK OF MAGNESIA PO) Take by mouth      Nutritional Supplements (ARGINAID) PACK Take by mouth 2 times daily      pantoprazole sodium (PROTONIX) 40 MG PACK packet Take 40 mg by mouth every morning (before breakfast)      budesonide (PULMICORT) 0.5 MG/2ML nebulizer suspension Take 1 ampule by nebulization 2 times daily      apixaban (ELIQUIS) 5 MG TABS tablet Take 5 mg by mouth 2 times daily      metoprolol (LOPRESSOR) 100 MG tablet Take 100 mg by mouth 2 times daily      insulin 70-30 (NOVOLIN 70/30) (70-30) 100 UNIT per ML injection vial Inject 10 Units into the skin 2 times daily      ipratropium-albuterol (DUONEB) 0.5-2.5 (3) MG/3ML SOLN nebulizer solution Inhale 1 vial into the lungs three times daily      measurements:    Wound 05/08/19 Back Mid;Lower #1 acquired 1/18/19 (Active)   Wound Image   5/8/2019 11:06 AM   Wound Traumatic 5/8/2019 11:06 AM   Dressing Status Clean;Dry; Intact 6/5/2019 10:57 AM   Dressing Changed Changed/New 6/5/2019 10:57 AM   Dressing/Treatment Alginate;Dry Dressing 6/5/2019 10:57 AM   Wound Cleansed Rinsed/Irrigated with saline; Wound cleanser 6/5/2019 10:57 AM   Wound Length (cm) 0.4 cm 6/12/2019  9:51 AM   Wound Width (cm) 0.2 cm 6/12/2019  9:51 AM   Wound Depth (cm) 0.1 cm 6/12/2019  9:51 AM   Wound Surface Area (cm^2) 0.08 cm^2 6/12/2019  9:51 AM   Change in Wound Size % (l*w) 98.33 6/12/2019  9:51 AM   Wound Volume (cm^3) 0.01 cm^3 6/12/2019  9:51 AM   Wound Healing % 100 6/12/2019  9:51 AM   Post-Procedure Length (cm) 1.3 cm 6/12/2019 11:05 AM   Post-Procedure Width (cm) 0.9 cm 6/12/2019 11:05 AM   Post-Procedure Depth (cm) 0.2 cm 6/12/2019 11:05 AM   Post-Procedure Surface Area (cm^2) 1.17 cm^2 6/12/2019 11:05 AM   Post-Procedure Volume (cm^3) 0.23 cm^3 6/12/2019 11:05 AM   Undermining Starts ___ O'Clock 11 5/15/2019  9:55 AM   Undermining Ends___ O'Clock 12 5/15/2019  9:55 AM   Undermining Maxium Distance (cm) 0.4 5/15/2019  9:55 AM   Wound Assessment Red 6/12/2019  9:51 AM   Drainage Amount Scant 6/12/2019  9:51 AM   Drainage Description Serous 6/12/2019  9:51 AM   Odor None 6/12/2019  9:51 AM   Omaira-wound Assessment Pink 6/12/2019  9:51 AM   Number of days: 35     Percent of Wound/Ulcer Debrided: 100%    Total Surface Area Debrided:  0.08 sq cm     Estimated Blood Loss:  Minimal  Hemostasis Achieved:  by pressure    Procedural Pain:  2  / 10   Post Procedural Pain:  1 / 10     Response to treatment:  Well tolerated by patient. Plan:   Treatment Note please see attached Discharge Instructions    Written patient dismissal instructions given to patient and signed by patient or POA.          Discharge Instructions       Visit Discharge/Physician Orders     Discharge condition: Stable     Assessment of pain at discharge:yes     Anesthetic used: 2% lidocaine gel     Discharge to: Home     Left via: [address] by: self     ECF/HHA: [facility]     Dressing Orders: BACK-Cleanse with normal saline, pack loosely with calcium alginate. Pad well and dry dressing. Change daily.     Treatment Orders:Eat a diet high in protein and vitamin C. Take a multiple vitamin daily unless contraindicated.     Javi protein shakes 2/day.     Keep all pressure off of wound site      [facility] followup visit 1 week_____________________________  (Please note your next appointment above and if you are unable to keep, kindly give a 24 hour notice.  Thank you.)     Physician signature:__________________________        If you experience any of the following, please call the [facility] during business hours:     * Increase in Pain  * Temperature over 101  * Increase in drainage from your wound  * Drainage with a foul odor  * Bleeding  * Increase in swelling  * Need for compression bandage changes due to slippage, breakthrough drainage.     If you need medical attention outside of the business hours of the [facility] please contact your PCP or go to the nearest emergency room.                             Electronically signed by Augusta Cruz MD on 6/12/2019 at 11:07 AM

## 2019-06-19 ENCOUNTER — HOSPITAL ENCOUNTER (OUTPATIENT)
Dept: WOUND CARE | Age: 84
Discharge: HOME | End: 2019-06-19
Payer: COMMERCIAL

## 2019-06-19 VITALS
DIASTOLIC BLOOD PRESSURE: 72 MMHG | HEIGHT: 69 IN | RESPIRATION RATE: 18 BRPM | HEART RATE: 74 BPM | SYSTOLIC BLOOD PRESSURE: 140 MMHG | WEIGHT: 207 LBS | BODY MASS INDEX: 30.66 KG/M2 | TEMPERATURE: 97.8 F

## 2019-06-19 DIAGNOSIS — L98.421: Primary | Chronic | ICD-10-CM

## 2019-06-19 PROCEDURE — 99212 OFFICE O/P EST SF 10 MIN: CPT

## 2019-06-19 PROCEDURE — 99213 OFFICE O/P EST LOW 20 MIN: CPT | Performed by: SURGERY

## 2019-06-19 RX ORDER — LIDOCAINE HYDROCHLORIDE 20 MG/ML
JELLY TOPICAL ONCE
Status: DISCONTINUED | OUTPATIENT
Start: 2019-06-19 | End: 2019-06-20 | Stop reason: HOSPADM

## 2019-06-19 NOTE — PROGRESS NOTES
Wound Healing Center Followup Visit Note    Referring Physician : Adolfo Faith DO  Romain Davison  MEDICAL RECORD NUMBER:  [id number]  AGE: 80 y.o. GENDER: male  : 1931  EPISODE DATE:  2019    Subjective:     Chief Complaint   Patient presents with    Wound Check     back      HISTORY of PRESENT ILLNESS HPI   Romain Davison is a 80 y.o. male who presents today in regards to follow up evaluation and treatment of wound/ulcer. That patient's past medical, family and social hx were reviewed and changes were made if present.     History of Wound Context:  The patient has had a wound of back which was first noted approximately 2019.  This has been treated local wound care at facility he lives in.  It occurred after he fell. [facility] was noted to have a hematoma and extensive ecchymosis initially. [facility] is chronically on eliquis for hx of afib.  He denies previous hx of similar wounds. [facility] currently lives in assisted living at a facility.       On their initial visit to the wound healing center, 19, the patient has noted that the wound has not been improving.  The patient has not had similar previous wounds in the past.       Pt is currently not on abx.     5/15/19  · Wound improved  · Continue aquacell  19  · Wound improved  19  · Wound improved  19  · Partial  · Wound improved  · collagen  19  · Wound healed  · Fu as needed     Wound/Ulcer Pain Timing/Severity: none  Quality of pain: N/A  Severity:  0 / 10   Modifying Factors: None  Associated Signs/Symptoms: none     Ulcer Identification:  Ulcer Type: diabetic, pressure and traumatic  Contributing Factors: diabetes, chronic pressure, shear force and malnutrition     Diabetic/Pressure/Non Pressure Ulcers only:  Ulcer: Non-Pressure ulcer, fat layer exposed  Wound: Contusion          PAST MEDICAL HISTORY      Diagnosis Date    Atherosclerotic heart disease     Atrial fibrillation ([facility] [address]     CAD (coronary artery disease)     COPD 6/12/2019 11:05 AM   Post-Procedure Depth (cm) 0.2 cm 6/12/2019 11:05 AM   Post-Procedure Surface Area (cm^2) 1.17 cm^2 6/12/2019 11:05 AM   Post-Procedure Volume (cm^3) 0.23 cm^3 6/12/2019 11:05 AM   Undermining Starts ___ O'Clock 11 5/15/2019  9:55 AM   Undermining Ends___ O'Clock 12 5/15/2019  9:55 AM   Undermining Maxium Distance (cm) 0.4 5/15/2019  9:55 AM   Wound Assessment Red 6/12/2019  9:51 AM   Drainage Amount Scant 6/12/2019  9:51 AM   Drainage Description Serous 6/12/2019  9:51 AM   Odor None 6/12/2019  9:51 AM   Omaira-wound Assessment Pink 6/12/2019  9:51 AM   Number of days: 41       Plan:   Treatment Note please see attached Discharge Instructions    Written patient dismissal instructions given to patient and signed by patient or POA. Discharge Instructions       Visit Discharge/Physician Orders     Discharge condition: Stable     Assessment of pain at discharge:none     Anesthetic used: na     Discharge to: Home     Left via: [address] by: self     ECF/HHA: [facility]     Dressing Orders: BACK healed     Treatment Orders:Eat a diet high in protein and vitamin C. Take a multiple vitamin daily unless contraindicated.     Javi protein shakes 2/day.     Keep all pressure off of wound site      [facility] followup visit healed and discharged___________________________  (Please note your next appointment above and if you are unable to keep, kindly give a 24 hour notice.  Thank you.)     Physician signature:__________________________  Manolo Euceda  If you experience any of the following, please call the [facility] during business hours:     * Increase in Pain  * Temperature over 101  * Increase in drainage from your wound  * Drainage with a foul odor  * Bleeding  * Increase in swelling  * Need for compression bandage changes due to slippage, breakthrough drainage.     If you need medical attention outside of the business hours of the [facility] please contact your PCP or go to the nearest emergency room.                                      Electronically signed by Sue Clifton MD on 6/19/2019 at 10:01 AM

## 2020-02-05 ENCOUNTER — OFFICE VISIT (OUTPATIENT)
Dept: SURGERY | Age: 85
End: 2020-02-05
Payer: COMMERCIAL

## 2020-02-05 VITALS — OXYGEN SATURATION: 97 % | TEMPERATURE: 97 F | HEART RATE: 64 BPM | BODY MASS INDEX: 31.9 KG/M2 | WEIGHT: 216 LBS

## 2020-02-05 PROCEDURE — 99204 OFFICE O/P NEW MOD 45 MIN: CPT | Performed by: PLASTIC SURGERY

## 2020-02-05 NOTE — PROGRESS NOTES
status: Former Smoker     Types: Cigarettes     Last attempt to quit: 1974     Years since quittin.7    Smokeless tobacco: Never Used   Substance and Sexual Activity    Alcohol use: Never     Frequency: Never    Drug use: Never    Sexual activity: Not Currently   Lifestyle    Physical activity:     Days per week: Not on file     Minutes per session: Not on file    Stress: Not on file   Relationships    Social connections:     Talks on phone: Not on file     Gets together: Not on file     Attends [employer] service: Not on file     Active member of club or organization: Not on file     Attends meetings of clubs or organizations: Not on file     Relationship status: Not on file    Intimate partner violence:     Fear of current or ex partner: Not on file     Emotionally abused: Not on file     Physically abused: Not on file     Forced sexual activity: Not on file   Other Topics Concern    Not on file   Social History Narrative    Not on file     Family History:   No family history on file. REVIEW OF SYSTEMS:    CONSTITUTIONAL:  negative for  fevers, chills, sweats and fatigue  EYES: negative for dipolpia or acute vision loss. RESPIRATORY:  negative for  dry cough, cough with sputum, dyspnea, wheezing and chest pain  HENT:negative for pain, headache, difficulty swallowing or nose bleeds. CARDIOVASCULAR:  negative for  chest pain, dyspnea, palpitations, syncope  GASTROINTESTINAL:  negative for nausea, vomiting, change in bowel habits, diarrhea, constipation and abdominal pain  EXTREMITIES: negative for edema  MUSCULOSKELETAL: negative for muscle weakness  SKIN: positive for lesionnegative for itching or rashes. HEME: negative for easy brusing or bleeding  BEHAVIOR/PSYCH:  negative for poor appetite, increased appetite, decreased sleep and poor concentration  All other systems negative      PHYSICAL EXAM:    VITALS:  There were no vitals taken for this visit.   CONSTITUTIONAL:  awake, alert, cooperative, no apparent distress, and appears stated age  EYES: PERRLA, EOMI, no signs of occular infection  LUNGS:  No increased work of breathing, good air exchange, clear to auscultation bilaterally, no crackles or wheezing  CARDIOVASCULAR:  Normal apical impulse, regular rate and rhythm  EXTREMITIES: no signs of clubbing or cyanosis. MUSCULOSKELETAL: negative for flaccid muscle tone or spastic movements. NEURO: Cranial nerves II-XII grossly intact. No signs of agitated mood. SKIN: Left posterior scalp fungating squamous cell carcinoma-  20mm x 20mm, x3mm red in color, irregular border, raised, no signs of bleeding,drainage or infection. Non tender to palpation. DATA:    Labs: CBC:   Lab Results   Component Value Date    WBC 6.8 02/15/2019    RBC 3.73 02/15/2019    HGB 9.5 02/15/2019    HCT 33.1 02/15/2019    MCV 88.7 02/15/2019    MCH 25.5 02/15/2019    MCHC 28.7 02/15/2019    RDW 18.8 02/15/2019     02/15/2019    MPV 9.5 02/15/2019     BMP:    Lab Results   Component Value Date     02/15/2019    K 4.3 02/15/2019     02/15/2019    CO2 36 02/15/2019    BUN 18 02/15/2019    LABALBU 3.6 02/15/2019    CREATININE 0.9 02/15/2019    CALCIUM 9.0 02/15/2019    GFRAA >60 02/15/2019    LABGLOM >60 02/15/2019    GLUCOSE 147 02/15/2019       Radiology Review:  No radiology needed at this time  Pathology Review:  Pathology reviewed   Outside pathology review today. 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   -We will plan to proceed and have the lesion formely excised with margins in the OR. -The patient will  require frozen sections in the operating room  -The patient will  require pre-op clearance from their PCP.       The patient and daughter were educated that we will plan for excision of the left posterior scalp squamous cell carcinoma with full-thickness skin graft.      -The risks, benefits and options were discussed with the pt. The risks included but not limited to pain, bleeding, infection, heavy scarring, damage to surrounding structures, fluid collections, asymmetry, and need for further procedures. All of His questions were answered to their satisfaction and He agrees to proceed with the procedure. Photos obtained        Follow up day of procedure. I attest that the patient was seen and examined by me, and concur with the documentation above. I agree with the assessment and the plan outlined. This document is generated, in part, by voice recognition software and thus  syntax and grammatical errors are possible.     Sandee Mcpherson  12:23 PM  2/7/2020

## 2020-02-12 RX ORDER — FLUTICASONE PROPIONATE 50 MCG
1 SPRAY, SUSPENSION (ML) NASAL DAILY
COMMUNITY
End: 2020-04-28 | Stop reason: SDUPTHER

## 2020-02-12 NOTE — PROGRESS NOTES
02/20/2020     [facility] Entrance____________  · [] Parking lot '\"I\"  is located on [facility] (the corner of [facility] and [facility]). To enter, press the button and the gate will lift. A free token will be provided to exit the lot. One car per patient is allowed to park in this lot. All other cars are to park on [address] [place] [address] either in the parking garage or the handicap lot. [] To reach the [facility] lobby from [address] [place] [address], upon entering the hospital, take elevator B to the 3rd floor. EDUCATION INSTRUCTIONS:      [] Knee or hip replacement booklet & exercise pamphlets given. [] Michi Hathaway placed in chart. [] Pre-admission Testing educational folder given  [] Incentive Spirometry,coughing & deep breathing exercises reviewed. []Medication information sheet(s)   []Fluoroscopy-Xray used in surgery reviewed with patient. Educational pamphlet placed in chart. []Pain: Post-op pain is normal and to be expected. You will be asked to rate your pain from 0-10(a zero is not acceptable-education is needed). Your post-op pain goal is:  [] Ask your nurse for your pain medication. [] Joint camp offered. [] Joint replacement booklets given. [] Other:___________________________    MEDICATION INSTRUCTIONS:   [x]Bring a complete list of your medications, please write the last time you took the medicine, give this list to the nurse. [x] Take the following medications the morning of surgery with 1-2 ounces of water: refer to med sheet                                                                                                                                                                                 [x] Stop herbal supplements and vitamins 5 days before your surgery. [x] DO NOT take any diabetic medicine the morning of surgery. Follow instructions for insulin the day before surgery.   [x] If you are diabetic and your blood sugar is low or you feel symptomatic, you may drink 1-2 ounces of apple juice or take a glucose tablet. The morning of your procedure, you may call the pre-op area if you have concerns about your blood sugar [phone]. [x] Use your inhalers the morning of surgery. Bring your emergency inhaler with you day of surgery. [x] Follow physician instructions regarding any blood thinners you may be taking. WHAT TO EXPECT:  [x] The day of surgery you will be greeted and checked in by the [facility].  In addition, you will be registered in the [facility] by a Patient Access Representative. Please bring your photo ID and insurance card. A nurse will greet you in accordance to the time you are needed in the pre-op area to prepare you for surgery. Please do not be discouraged if you are not greeted in the order you arrive as there are many variables that are involved in patient preparation. Your patience is greatly appreciated as you wait for your nurse. Please bring in items such as: books, magazines, newspapers, electronics, or any other items  to occupy your time in the waiting area. []  Delays may occur with surgery and staff will make a sincere effort to keep you informed of delays. If any delays occur with your procedure, we apologize ahead of time for your inconvenience as we recognize the value of your time.

## 2020-02-13 NOTE — H&P
Department of Plastic Surgery - Adult  Attending Consult Note        CHIEF COMPLAINT:   Squamous Cell Cancer of left scalp     History Obtained From:  patient, daughter     HISTORY OF PRESENT ILLNESS:                 The patient is a 80 y.o. 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 The patient's daughter states that this lesion was rapidly growing when seen by the dermatologist.  The patient's daughter states that the lesion was biopsied by her dermatologist and were sent to our office for planning of surgical excision.       Past Medical History:    Past Medical History        Past Medical History:   Diagnosis Date    Atherosclerotic heart disease      Atrial fibrillation ([address]      CAD (coronary artery disease)      COPD (chronic obstructive pulmonary disease) ([address]      Diabetes mellitus ([address]      Hyperlipidemia      Hypertension      Major depressive disorder      Pulmonary hypertension ([address]      Skin ulcer of back with fat layer exposed ([address] 5/8/2019    Traumatic hematoma of lower back 5/8/2019         Past Surgical History:    Past Surgical History         Past Surgical History:   Procedure Laterality Date    ABDOMINAL AORTIC ANEURYSM REPAIR        BRAIN SURGERY        CORONARY ARTERY BYPASS GRAFT        JOINT REPLACEMENT        KNEE SURGERY        PACEMAKER INSERTION        PACEMAKER PLACEMENT             Current Medications:   Current Hospital Medications   No current facility-administered medications for this visit. Allergies:  Patient has no known allergies.     Social History:   Social History               Socioeconomic History    Marital status:         Spouse name: Not on file    Number of children: Not on file    Years of education: Not on file    Highest education level: Not on file   Occupational History    Not on file   Social Needs    Financial resource strain: Not on file    Food insecurity:       Worry: Not on file       Inability: Not on file    Transportation needs:       Medical: Not on file       Non-medical: Not on file   Tobacco Use    Smoking status: Former Smoker       Types: Cigarettes       Last attempt to quit: 1974       Years since quittin.7    Smokeless tobacco: Never Used   Substance and Sexual Activity    Alcohol use: Never       Frequency: Never    Drug use: Never    Sexual activity: Not Currently   Lifestyle    Physical activity:       Days per week: Not on file       Minutes per session: Not on file    Stress: Not on file   Relationships    Social connections:       Talks on phone: Not on file       Gets together: Not on file       Attends Sabianist service: Not on file       Active member of club or organization: Not on file       Attends meetings of clubs or organizations: Not on file       Relationship status: Not on file    Intimate partner violence:       Fear of current or ex partner: Not on file       Emotionally abused: Not on file       Physically abused: Not on file       Forced sexual activity: Not on file   Other Topics Concern    Not on file   Social History Narrative    Not on file         Family History:   Family History   No family history on file.        REVIEW OF SYSTEMS:    CONSTITUTIONAL:  negative for  fevers, chills, sweats and fatigue  EYES: negative for dipolpia or acute vision loss. RESPIRATORY:  negative for  dry cough, cough with sputum, dyspnea, wheezing and chest pain  HENT:negative for pain, headache, difficulty swallowing or nose bleeds. CARDIOVASCULAR:  negative for  chest pain, dyspnea, palpitations, syncope  GASTROINTESTINAL:  negative for nausea, vomiting, change in bowel habits, diarrhea, constipation and abdominal pain  EXTREMITIES: negative for edema  MUSCULOSKELETAL: negative for muscle weakness  SKIN: positive for lesionnegative for itching or rashes.   HEME: negative for easy brusing or bleeding  BEHAVIOR/PSYCH:  negative for poor appetite, increased appetite, decreased sleep and poor concentration  All other systems negative        PHYSICAL EXAM:    VITALS:  There were no vitals taken for this visit. CONSTITUTIONAL:  awake, alert, cooperative, no apparent distress, and appears stated age  EYES: PERRLA, EOMI, no signs of occular infection  LUNGS:  No increased work of breathing, good air exchange, clear to auscultation bilaterally, no crackles or wheezing  CARDIOVASCULAR:  Normal apical impulse, regular rate and rhythm  EXTREMITIES: no signs of clubbing or cyanosis. MUSCULOSKELETAL: negative for flaccid muscle tone or spastic movements. NEURO: Cranial nerves II-XII grossly intact. No signs of agitated mood.      SKIN: Left posterior scalp fungating squamous cell carcinoma-  20mm x 20mm, x3mm red in color, irregular border, raised, no signs of bleeding,drainage or infection. Non tender to palpation.      DATA:    Labs: CBC:         Lab Results   Component Value Date     WBC 6.8 02/15/2019     RBC 3.73 02/15/2019     HGB 9.5 02/15/2019     HCT 33.1 02/15/2019     MCV 88.7 02/15/2019     MCH 25.5 02/15/2019     MCHC 28.7 02/15/2019     RDW 18.8 02/15/2019      02/15/2019     MPV 9.5 02/15/2019      BMP:          Lab Results   Component Value Date      02/15/2019     K 4.3 02/15/2019      02/15/2019     CO2 36 02/15/2019     BUN 18 02/15/2019     LABALBU 3.6 02/15/2019     CREATININE 0.9 02/15/2019     CALCIUM 9.0 02/15/2019     GFRAA >60 02/15/2019     LABGLOM >60 02/15/2019     GLUCOSE 147 02/15/2019         Radiology Review:  No radiology needed at this time  Pathology Review:  Pathology reviewed   Outside pathology review today. IMPRESSION/RECOMMENDATIONS:          Diagnosis  -) Squamous cell carcinoma of left posterior scalp        -The patient was counseled on their pathology results and the need for surgical   intervention.   -We will plan to proceed and have the lesion formely excised with margins in the OR.   -The

## 2020-02-13 NOTE — PROGRESS NOTES
INSTRUCTIONS SENT BY YASMEEN DURÁN RN  WERE RECEIVED AT [facility] BY NURSE Mckenzie 350. SHE SAID THEY HAVE INSTRUCTIONS TO HOLD ELIQUIS 2 DAYS BEFORE OR. PATIENT DOES SIGN FOR HERSELF. HER PACEMAKER WAS CHECKED ON 2/7/2020. SHE WILL FAX PACER CHECK INFO.

## 2020-02-14 ENCOUNTER — TELEPHONE (OUTPATIENT)
Dept: SURGERY | Age: 85
End: 2020-02-14

## 2020-02-18 NOTE — PROGRESS NOTES
Marsha Mcdonald RN from [facility], and I have been trying to obtain documentation of the pacemaker check. It is remotely transmitted monthly, which they confirm. I notified Dr. Angela Reasons of the difficulty, it is fine to go for surgery with their confirmation of monthly transmissions.

## 2020-02-20 ENCOUNTER — ANESTHESIA (OUTPATIENT)
Dept: OPERATING ROOM | Age: 85
End: 2020-02-20
Payer: MEDICARE

## 2020-02-20 ENCOUNTER — HOSPITAL ENCOUNTER (OUTPATIENT)
Age: 85
Setting detail: OUTPATIENT SURGERY
Discharge: OTHER INSTITUTION | End: 2020-02-20
Attending: PLASTIC SURGERY | Admitting: PLASTIC SURGERY
Payer: MEDICARE

## 2020-02-20 ENCOUNTER — ANESTHESIA EVENT (OUTPATIENT)
Dept: OPERATING ROOM | Age: 85
End: 2020-02-20
Payer: MEDICARE

## 2020-02-20 VITALS
OXYGEN SATURATION: 99 % | SYSTOLIC BLOOD PRESSURE: 158 MMHG | HEART RATE: 64 BPM | TEMPERATURE: 97.3 F | DIASTOLIC BLOOD PRESSURE: 75 MMHG | BODY MASS INDEX: 31.78 KG/M2 | HEIGHT: 70 IN | WEIGHT: 222 LBS | RESPIRATION RATE: 20 BRPM

## 2020-02-20 VITALS — SYSTOLIC BLOOD PRESSURE: 126 MMHG | OXYGEN SATURATION: 96 % | DIASTOLIC BLOOD PRESSURE: 60 MMHG

## 2020-02-20 LAB — METER GLUCOSE: 95 MG/DL (ref 74–99)

## 2020-02-20 PROCEDURE — 7100000011 HC PHASE II RECOVERY - ADDTL 15 MIN: Performed by: PLASTIC SURGERY

## 2020-02-20 PROCEDURE — 88331 PATH CONSLTJ SURG 1 BLK 1SPC: CPT

## 2020-02-20 PROCEDURE — 2580000003 HC RX 258: Performed by: PHYSICIAN ASSISTANT

## 2020-02-20 PROCEDURE — 2709999900 HC NON-CHARGEABLE SUPPLY: Performed by: PLASTIC SURGERY

## 2020-02-20 PROCEDURE — 15220 FTH/GFT FR S/A/L 20 SQ CM/<: CPT | Performed by: PLASTIC SURGERY

## 2020-02-20 PROCEDURE — 6360000002 HC RX W HCPCS: Performed by: PHYSICIAN ASSISTANT

## 2020-02-20 PROCEDURE — 6370000000 HC RX 637 (ALT 250 FOR IP): Performed by: PLASTIC SURGERY

## 2020-02-20 PROCEDURE — 6360000002 HC RX W HCPCS: Performed by: NURSE ANESTHETIST, CERTIFIED REGISTERED

## 2020-02-20 PROCEDURE — 88342 IMHCHEM/IMCYTCHM 1ST ANTB: CPT

## 2020-02-20 PROCEDURE — 82962 GLUCOSE BLOOD TEST: CPT

## 2020-02-20 PROCEDURE — 11626 EXC S/N/H/F/G MAL+MRG >4 CM: CPT | Performed by: PLASTIC SURGERY

## 2020-02-20 PROCEDURE — 3700000000 HC ANESTHESIA ATTENDED CARE: Performed by: PLASTIC SURGERY

## 2020-02-20 PROCEDURE — 3600000002 HC SURGERY LEVEL 2 BASE: Performed by: PLASTIC SURGERY

## 2020-02-20 PROCEDURE — 3700000001 HC ADD 15 MINUTES (ANESTHESIA): Performed by: PLASTIC SURGERY

## 2020-02-20 PROCEDURE — 3600000012 HC SURGERY LEVEL 2 ADDTL 15MIN: Performed by: PLASTIC SURGERY

## 2020-02-20 PROCEDURE — 2580000003 HC RX 258: Performed by: NURSE ANESTHETIST, CERTIFIED REGISTERED

## 2020-02-20 PROCEDURE — 88341 IMHCHEM/IMCYTCHM EA ADD ANTB: CPT

## 2020-02-20 PROCEDURE — 2500000003 HC RX 250 WO HCPCS: Performed by: PLASTIC SURGERY

## 2020-02-20 PROCEDURE — 7100000010 HC PHASE II RECOVERY - FIRST 15 MIN: Performed by: PLASTIC SURGERY

## 2020-02-20 PROCEDURE — 88305 TISSUE EXAM BY PATHOLOGIST: CPT

## 2020-02-20 RX ORDER — SODIUM CHLORIDE 0.9 % (FLUSH) 0.9 %
10 SYRINGE (ML) INJECTION PRN
Status: DISCONTINUED | OUTPATIENT
Start: 2020-02-20 | End: 2020-02-20 | Stop reason: HOSPADM

## 2020-02-20 RX ORDER — CEFAZOLIN SODIUM 2 G/50ML
2 SOLUTION INTRAVENOUS
Status: COMPLETED | OUTPATIENT
Start: 2020-02-20 | End: 2020-02-20

## 2020-02-20 RX ORDER — SODIUM CHLORIDE 9 MG/ML
INJECTION, SOLUTION INTRAVENOUS CONTINUOUS
Status: DISCONTINUED | OUTPATIENT
Start: 2020-02-20 | End: 2020-02-20 | Stop reason: HOSPADM

## 2020-02-20 RX ORDER — ACETAMINOPHEN AND CODEINE PHOSPHATE 300; 30 MG/1; MG/1
1 TABLET ORAL EVERY 4 HOURS PRN
Qty: 10 TABLET | Refills: 0 | Status: SHIPPED | OUTPATIENT
Start: 2020-02-20 | End: 2020-02-25

## 2020-02-20 RX ORDER — HYDROCODONE BITARTRATE AND ACETAMINOPHEN 5; 325 MG/1; MG/1
1 TABLET ORAL PRN
Status: DISCONTINUED | OUTPATIENT
Start: 2020-02-20 | End: 2020-02-20 | Stop reason: HOSPADM

## 2020-02-20 RX ORDER — FENTANYL CITRATE 50 UG/ML
INJECTION, SOLUTION INTRAMUSCULAR; INTRAVENOUS PRN
Status: DISCONTINUED | OUTPATIENT
Start: 2020-02-20 | End: 2020-02-20 | Stop reason: SDUPTHER

## 2020-02-20 RX ORDER — GINSENG 100 MG
CAPSULE ORAL
Qty: 2 TUBE | Refills: 1 | Status: SHIPPED | OUTPATIENT
Start: 2020-02-20

## 2020-02-20 RX ORDER — SODIUM CHLORIDE 0.9 % (FLUSH) 0.9 %
10 SYRINGE (ML) INJECTION EVERY 12 HOURS SCHEDULED
Status: DISCONTINUED | OUTPATIENT
Start: 2020-02-20 | End: 2020-02-20 | Stop reason: HOSPADM

## 2020-02-20 RX ORDER — PROPOFOL 10 MG/ML
INJECTION, EMULSION INTRAVENOUS CONTINUOUS PRN
Status: DISCONTINUED | OUTPATIENT
Start: 2020-02-20 | End: 2020-02-20 | Stop reason: SDUPTHER

## 2020-02-20 RX ORDER — HYDROCODONE BITARTRATE AND ACETAMINOPHEN 5; 325 MG/1; MG/1
2 TABLET ORAL PRN
Status: DISCONTINUED | OUTPATIENT
Start: 2020-02-20 | End: 2020-02-20 | Stop reason: HOSPADM

## 2020-02-20 RX ORDER — SODIUM CHLORIDE 9 MG/ML
INJECTION, SOLUTION INTRAVENOUS CONTINUOUS PRN
Status: DISCONTINUED | OUTPATIENT
Start: 2020-02-20 | End: 2020-02-20 | Stop reason: SDUPTHER

## 2020-02-20 RX ORDER — DIAPER,BRIEF,INFANT-TODD,DISP
EACH MISCELLANEOUS PRN
Status: DISCONTINUED | OUTPATIENT
Start: 2020-02-20 | End: 2020-02-20 | Stop reason: ALTCHOICE

## 2020-02-20 RX ORDER — LIDOCAINE HYDROCHLORIDE AND EPINEPHRINE 10; 10 MG/ML; UG/ML
INJECTION, SOLUTION INFILTRATION; PERINEURAL PRN
Status: DISCONTINUED | OUTPATIENT
Start: 2020-02-20 | End: 2020-02-20 | Stop reason: ALTCHOICE

## 2020-02-20 RX ADMIN — SODIUM CHLORIDE: 9 INJECTION, SOLUTION INTRAVENOUS at 05:52

## 2020-02-20 RX ADMIN — FENTANYL CITRATE 25 MCG: 50 INJECTION, SOLUTION INTRAMUSCULAR; INTRAVENOUS at 08:14

## 2020-02-20 RX ADMIN — CEFAZOLIN SODIUM 2 G: 2 SOLUTION INTRAVENOUS at 07:37

## 2020-02-20 RX ADMIN — PROPOFOL 35 MCG/KG/MIN: 10 INJECTION, EMULSION INTRAVENOUS at 07:46

## 2020-02-20 RX ADMIN — FENTANYL CITRATE 25 MCG: 50 INJECTION, SOLUTION INTRAMUSCULAR; INTRAVENOUS at 07:46

## 2020-02-20 RX ADMIN — SODIUM CHLORIDE: 9 INJECTION, SOLUTION INTRAVENOUS at 07:37

## 2020-02-20 ASSESSMENT — PAIN - FUNCTIONAL ASSESSMENT: PAIN_FUNCTIONAL_ASSESSMENT: 0-10

## 2020-02-20 ASSESSMENT — PULMONARY FUNCTION TESTS: PIF_VALUE: 1

## 2020-02-20 ASSESSMENT — PAIN SCALES - GENERAL
PAINLEVEL_OUTOF10: 0

## 2020-02-20 ASSESSMENT — COPD QUESTIONNAIRES: CAT_SEVERITY: SEVERE

## 2020-02-20 ASSESSMENT — ENCOUNTER SYMPTOMS: SHORTNESS OF BREATH: 1

## 2020-02-20 NOTE — ANESTHESIA PRE PROCEDURE
Historical Provider, MD   acetaminophen (TYLENOL) 325 MG tablet Take 650 mg by mouth every 4 hours as needed for Pain    Historical Provider, MD   Menthol, Topical Analgesic, (BIOFREEZE ROLL-ON) 4 % GEL Apply topically    Historical Provider, MD   Magnesium Hydroxide (MILK OF MAGNESIA PO) Take by mouth 2 tablespoon every 3 days as needed    Historical Provider, MD   apixaban (ELIQUIS) 5 MG TABS tablet Take 5 mg by mouth 2 times daily    Historical Provider, MD   insulin 70-30 (NOVOLIN 70/30) (70-30) 100 UNIT per ML injection vial Inject 10 Units into the skin 2 times daily    Historical Provider, MD   melatonin 3 MG TABS tablet Take 3 mg by mouth nightly as needed    Historical Provider, MD       Current medications:    Current Facility-Administered Medications   Medication Dose Route Frequency Provider Last Rate Last Dose    sodium chloride flush 0.9 % injection 10 mL  10 mL Intravenous 2 times per day RACHEL Benavides        sodium chloride flush 0.9 % injection 10 mL  10 mL Intravenous PRN RACHEL Benavides        0.9 % sodium chloride infusion   Intravenous Continuous RACHEL Benavides 125 mL/hr at 02/20/20 1990      ceFAZolin (ANCEF) 2 g in dextrose 3 % 50 mL IVPB (duplex)  2 g Intravenous On Call to [address] [place] [address]        HYDROcodone-acetaminophen (NORCO) 5-325 MG per tablet 1 tablet  1 tablet Oral PRN Cornelius Hdez MD        Or   Sancho Gonzáles HYDROcodone-acetaminophen (NORCO) 5-325 MG per tablet 2 tablet  2 tablet Oral PRN Cornelius Hdez MD           Allergies:  No Known Allergies    Problem List:    Patient Active Problem List   Diagnosis Code    Type 2 diabetes mellitus with diabetic polyneuropathy, with long-term current use of insulin (HCC) E11.42, Z79.4    Coronary artery disease involving native coronary artery of native heart without angina pectoris I25.10    Paroxysmal atrial fibrillation ([facility]) I48.0    Pacemaker Z95.0    Pulmonary emphysema ([facility] [address] J43.9    Chronic 02/11/20 222 lb (100.7 kg)   02/05/20 216 lb (98 kg)     Body mass index is 31.85 kg/m². CBC:   Lab Results   Component Value Date    WBC 6.8 02/15/2019    RBC 3.73 02/15/2019    HGB 9.5 02/15/2019    HCT 33.1 02/15/2019    MCV 88.7 02/15/2019    RDW 18.8 02/15/2019     02/15/2019       CMP:   Lab Results   Component Value Date     02/15/2019    K 4.3 02/15/2019     02/15/2019    CO2 36 02/15/2019    BUN 18 02/15/2019    CREATININE 0.9 02/15/2019    GFRAA >60 02/15/2019    LABGLOM >60 02/15/2019    GLUCOSE 147 02/15/2019    PROT 7.0 02/15/2019    CALCIUM 9.0 02/15/2019    BILITOT 0.4 02/15/2019    ALKPHOS 80 02/15/2019    AST 14 02/15/2019    ALT 7 02/15/2019       POC Tests: No results for input(s): POCGLU, POCNA, POCK, POCCL, POCBUN, POCHEMO, POCHCT in the last 72 hours.     Coags: No results found for: PROTIME, INR, APTT    HCG (If Applicable): No results found for: PREGTESTUR, PREGSERUM, HCG, HCGQUANT     ABGs: No results found for: PHART, PO2ART, YMR9VNT, BXK7TZE, BEART, B1LNQUZN     Type & Screen (If Applicable):  No results found for: LABABO, [address]    Anesthesia Evaluation  Patient summary reviewed no history of anesthetic complications:   Airway: Mallampati: II  TM distance: >3 FB   Neck ROM: full   Dental:    (+) lower dentures and upper dentures      Pulmonary: breath sounds clear to auscultation  (+) COPD: severe,  shortness of breath:                            ROS comment: Former Smoker  Chronic hypoxic resp failure   Cardiovascular:    (+) hypertension:, pacemaker: pacemaker, CAD:, dysrhythmias: atrial fibrillation, pulmonary hypertension:,         Rhythm: regular  Rate: normal                    Neuro/Psych:   (+) neuromuscular disease (diabetic polyneuropathy):, depression/anxiety             GI/Hepatic/Renal:             Endo/Other:    (+) DiabetesType II DM, using insulin, : arthritis: OA., .                  ROS comment: obese Abdominal:           Vascular:   + PVD, aortic or cerebral (ABDOMINAL AORTIC ANEURYSM REPAIR), . Anesthesia Plan      MAC     ASA 4       Induction: intravenous. Anesthetic plan and risks discussed with patient. Plan discussed with CRNA.                   Fidel Jacobs MD   2/20/2020

## 2020-02-21 NOTE — OP NOTE
[address]                  [address] [facility], [address] [place] [address]                                OPERATIVE REPORT    PATIENT NAME: Troy Braga                     :        1931  MED REC NO:   [id number]                            ROOM:  ACCOUNT NO:   [de-identified]                           ADMIT DATE: 2020  PROVIDER:     Flory Fulton DO    DATE OF PROCEDURE:  2020    DIAGNOSIS:  Left posterior scalp squamous cell carcinoma. PROCEDURE PERFORMED:  Excision of left posterior scalp squamous cell  carcinoma with full-thickness skin graft. Lesion measurements 36 x 36 mm with 4-mm margins. Total defect size 44 x 44 mm. Full-thickness skin graft 35 x 35 mm. ATTENDING:  Melvin Loredo MD    ASSISTANTS:  Flory Fulton, PGY-1 and William Vargas PA-C, due to lack  of adequate assistants. ANESTHESIA:  MAC.    ESTIMATED BLOOD LOSS:  10 mL. IV FLUIDS:  250 mL of crystalloid. INDICATIONS FOR PROCEDURE:  This is an 80-year-old male with  biopsy-positive squamous cell carcinoma of the left posterior scalp. Risks and benefits of the procedure including but not limited to  scarring, infection, bleeding, damage to adjacent structures, poor  uptake of full-thickness skin graft, need for revision surgery were  discussed with the  patient who agrees and understands and wants to  proceed with the procedure. DESCRIPTION OF PROCEDURE:  The patient was taken back to the operating  room and placed supine on the operating table. SCDs were placed and  functioning. The patient was then given to Anesthesia for proper  induction. The patient was then prepped and draped in a sterile  fashion. Lidocaine with 1% epinephrine was then injected over the scalp  in the left clavicular area where the full-thickness skin graft was to  be taken. The lesion was then marked with 4-mm margins.   Using a 10  blade scalpel, the lesion off of the scalp was removed all the way  down to the periosteal layer and sent for pathologic review. Due to  inadequate closure secondary to poor cosmesis and unable to approximate  the wound edges, undermining was then performed around the defect with a  Bovie cautery. Hemostasis was then achieved with Bovie cautery. A  Purse-string suture was then placed around the perimeter of the defect  using 2-0 Vicryl stitch to help approximate the skin edges. The  full-thickness graft was then formed by taking skin from the left  clavicular region in a 35 x 35-mm portion. The full-thickness  graft was then debulked of any fatty subcutaneous tissue and placed over  the scalp defect. The full-thickness skin graft was then secured in place  using 4-0 Vicryl suture covering the perimeter of the defect. A bolster  Dressing of gauze covered with Xeroform dressing  was then placed using 4-0 nylon stitch, in order to provide proper compression to the full-thickness skin graft for best uptake. The full-thickness skin graft defect in the left clavicular region was then closed with 3-0  Monocryl deep dermal sutures in a simple interrupted fashion, and the  epidermal layer was closed with 4-0 Monocryl in a subcutaneous fashion. Upon pathologic review, all margins were reported back as negative for  squamous cell carcinoma or any other malignancy. The patient was then  turned back to Anesthesia for proper awakening. The patient tolerated  the procedure well.         Roscoe Johnson DO    D: 02/20/2020 8:45:20       T: 02/20/2020 9:48:42     [email or web address]  Job#: [id number]     Doc#: [id number]    CC:

## 2020-03-02 ENCOUNTER — OFFICE VISIT (OUTPATIENT)
Dept: SURGERY | Age: 85
End: 2020-03-02

## 2020-03-02 VITALS
SYSTOLIC BLOOD PRESSURE: 144 MMHG | OXYGEN SATURATION: 90 % | HEART RATE: 76 BPM | DIASTOLIC BLOOD PRESSURE: 80 MMHG | TEMPERATURE: 97.6 F

## 2020-03-02 PROCEDURE — 99024 POSTOP FOLLOW-UP VISIT: CPT | Performed by: PLASTIC SURGERY

## 2020-03-02 NOTE — PROGRESS NOTES
Subjective: Follow up today from  Excision of left posterior scalp squamous cell carcinoma with full-thickness skin graft. Denies fever, nausea, vomiting, leg pain or swelling, pain is absent. The pt states that they have  kept the skin graft covered with bolster dressings sutured in place as well as Steri-Strips and Tegaderm to their donor site. The patient states that they have  finished their antibiotic. They state that their pain is minimal.  He presents today with his daughter. Objective: There were no vitals taken for this visit. Donor Wound: Clean, and intact. No signs of infection, wound healing well    Scalp Wound:  Bolster dressing intact , approximately 95% of the FTSG intact     Neuro- Sensation  intact to jodi wound sites with light touch     Assessment:    Patient Active Problem List   Diagnosis    Type 2 diabetes mellitus with diabetic polyneuropathy, with long-term current use of insulin ([facility]    Coronary artery disease involving native coronary artery of native heart without angina pectoris    Paroxysmal atrial fibrillation ([facility]    Pacemaker    Pulmonary emphysema (HCC)    Chronic respiratory failure with hypoxia ([facility]    Preoperative testing       Labs: CBC:   Lab Results   Component Value Date    WBC 6.8 02/15/2019    RBC 3.73 02/15/2019    HGB 9.5 02/15/2019    HCT 33.1 02/15/2019    MCV 88.7 02/15/2019    MCH 25.5 02/15/2019    MCHC 28.7 02/15/2019    RDW 18.8 02/15/2019     02/15/2019    MPV 9.5 02/15/2019     BMP:    Lab Results   Component Value Date     02/15/2019    K 4.3 02/15/2019     02/15/2019    CO2 36 02/15/2019    BUN 18 02/15/2019    LABALBU 3.6 02/15/2019    CREATININE 0.9 02/15/2019    CALCIUM 9.0 02/15/2019    GFRAA >60 02/15/2019    LABGLOM >60 02/15/2019    GLUCOSE 147 02/15/2019         Pathology Report-  [address] [place]        [address] [facility]     margins are negative for the 3.7 cm invasive  carcinoma, a separate incidental 0.3 cm superficial basosquamous  carcinoma is identified in block A17, and is transected on the posterior  margin. This separate basosquamous carcinoma was not grossly visible, and  only sampled at the time of permanent section. Intradepartmental consultation is obtained. Plan:       Good take with full-thickness skin graft. Donor site healthy. Advised patient and family of their pathology report indicating a high-grade squamous cell carcinoma or a squama wide eccrine variant. We will take him to tumor board as there is some angiolymphatic invasion on histology. The patient also wanted to bring to my attention that he was exposed to agent orange in [facility].      Educated the patient on how to care for their skin graft. Showed the patient that they will need to apply bacitracin overlaying the skin graft and xeroform gauze is to be placed over the bacitracin. This can then be covered with gauze. The patient is to have this dressing changed daily. The patient is able to leave their donor site wound open to air has is healing well. The patient voices understanding with instructions. Dressing supplies given today. F/U 2 week  Call office with concerns or signs of infection. I attest that the patient was seen and examined by me, and concur with the documentation above. I agree with the assessment and the plan outlined. This document is generated, in part, by voice recognition software and thus  syntax and grammatical errors are possible.     Yovanny Baldwin  10:42 AM  3/4/2020

## 2020-03-04 ENCOUNTER — TELEPHONE (OUTPATIENT)
Dept: SURGERY | Age: 85
End: 2020-03-04

## 2020-03-17 ENCOUNTER — TELEPHONE (OUTPATIENT)
Dept: SURGERY | Age: 85
End: 2020-03-17

## 2020-05-12 NOTE — TELEPHONE ENCOUNTER
Josy Pascal and Shayy Medellin from [facility] called today wanting to know what the follow up is for [employer]. Are we still waiting on tumor board meeting? Does he need to be seen in our office again?

## 2020-05-13 NOTE — TELEPHONE ENCOUNTER
Spoke with Mary Monsalve gave her this information. Since it is already mid-May, she will call us back to check in the beginning of June.

## 2020-06-26 ENCOUNTER — HOSPITAL ENCOUNTER (EMERGENCY)
Age: 85
Discharge: HOME | End: 2020-06-26
Attending: EMERGENCY MEDICINE
Payer: OTHER GOVERNMENT

## 2020-06-26 ENCOUNTER — APPOINTMENT (OUTPATIENT)
Dept: GENERAL RADIOLOGY | Age: 85
End: 2020-06-26
Payer: OTHER GOVERNMENT

## 2020-06-26 VITALS
BODY MASS INDEX: 31.78 KG/M2 | HEIGHT: 70 IN | SYSTOLIC BLOOD PRESSURE: 136 MMHG | HEART RATE: 64 BPM | OXYGEN SATURATION: 99 % | RESPIRATION RATE: 20 BRPM | TEMPERATURE: 95.6 F | DIASTOLIC BLOOD PRESSURE: 82 MMHG | WEIGHT: 222 LBS

## 2020-06-26 LAB
ALBUMIN SERPL-MCNC: 4 G/DL (ref 3.5–5.2)
ALP BLD-CCNC: 76 U/L (ref 40–129)
ALT SERPL-CCNC: 8 U/L (ref 0–40)
ANION GAP SERPL CALCULATED.3IONS-SCNC: 10 MMOL/L (ref 7–16)
APTT: 38.3 SEC (ref 24.5–35.1)
AST SERPL-CCNC: 15 U/L (ref 0–39)
BACTERIA: NORMAL /HPF
BASOPHILS ABSOLUTE: 0.05 E9/L (ref 0–0.2)
BASOPHILS RELATIVE PERCENT: 0.6 % (ref 0–2)
BILIRUB SERPL-MCNC: 0.5 MG/DL (ref 0–1.2)
BILIRUBIN URINE: NEGATIVE
BLOOD, URINE: ABNORMAL
BUN BLDV-MCNC: 13 MG/DL (ref 8–23)
CALCIUM SERPL-MCNC: 9.5 MG/DL (ref 8.6–10.2)
CHLORIDE BLD-SCNC: 96 MMOL/L (ref 98–107)
CLARITY: CLEAR
CO2: 34 MMOL/L (ref 22–29)
COLOR: YELLOW
CREAT SERPL-MCNC: 0.8 MG/DL (ref 0.7–1.2)
EOSINOPHILS ABSOLUTE: 0.07 E9/L (ref 0.05–0.5)
EOSINOPHILS RELATIVE PERCENT: 0.8 % (ref 0–6)
GFR AFRICAN AMERICAN: >60
GFR NON-AFRICAN AMERICAN: >60 ML/MIN/1.73
GLUCOSE BLD-MCNC: 257 MG/DL (ref 74–99)
GLUCOSE URINE: 500 MG/DL
HCT VFR BLD CALC: 40.7 % (ref 37–54)
HEMOGLOBIN: 12.7 G/DL (ref 12.5–16.5)
IMMATURE GRANULOCYTES #: 0.04 E9/L
IMMATURE GRANULOCYTES %: 0.4 % (ref 0–5)
INR BLD: 1.6
KETONES, URINE: NEGATIVE MG/DL
LEUKOCYTE ESTERASE, URINE: NEGATIVE
LYMPHOCYTES ABSOLUTE: 0.94 E9/L (ref 1.5–4)
LYMPHOCYTES RELATIVE PERCENT: 10.5 % (ref 20–42)
MCH RBC QN AUTO: 27.1 PG (ref 26–35)
MCHC RBC AUTO-ENTMCNC: 31.2 % (ref 32–34.5)
MCV RBC AUTO: 86.8 FL (ref 80–99.9)
MONOCYTES ABSOLUTE: 0.76 E9/L (ref 0.1–0.95)
MONOCYTES RELATIVE PERCENT: 8.5 % (ref 2–12)
NEUTROPHILS ABSOLUTE: 7.07 E9/L (ref 1.8–7.3)
NEUTROPHILS RELATIVE PERCENT: 79.2 % (ref 43–80)
NITRITE, URINE: NEGATIVE
PDW BLD-RTO: 15.5 FL (ref 11.5–15)
PH UA: 7 (ref 5–9)
PLATELET # BLD: 215 E9/L (ref 130–450)
PMV BLD AUTO: 9.1 FL (ref 7–12)
POTASSIUM REFLEX MAGNESIUM: 4.1 MMOL/L (ref 3.5–5)
PROTEIN UA: NEGATIVE MG/DL
PROTHROMBIN TIME: 17.9 SEC (ref 9.3–12.4)
RBC # BLD: 4.69 E12/L (ref 3.8–5.8)
RBC UA: NORMAL /HPF (ref 0–2)
SODIUM BLD-SCNC: 140 MMOL/L (ref 132–146)
SPECIFIC GRAVITY UA: 1.01 (ref 1–1.03)
TOTAL PROTEIN: 7.3 G/DL (ref 6.4–8.3)
TROPONIN: <0.01 NG/ML (ref 0–0.03)
UROBILINOGEN, URINE: 0.2 E.U./DL
WBC # BLD: 8.9 E9/L (ref 4.5–11.5)
WBC UA: NORMAL /HPF (ref 0–5)

## 2020-06-26 PROCEDURE — 84484 ASSAY OF TROPONIN QUANT: CPT

## 2020-06-26 PROCEDURE — 99285 EMERGENCY DEPT VISIT HI MDM: CPT

## 2020-06-26 PROCEDURE — 85025 COMPLETE CBC W/AUTO DIFF WBC: CPT

## 2020-06-26 PROCEDURE — 85610 PROTHROMBIN TIME: CPT

## 2020-06-26 PROCEDURE — 80053 COMPREHEN METABOLIC PANEL: CPT

## 2020-06-26 PROCEDURE — 81001 URINALYSIS AUTO W/SCOPE: CPT

## 2020-06-26 PROCEDURE — 85730 THROMBOPLASTIN TIME PARTIAL: CPT

## 2020-06-26 PROCEDURE — 71045 X-RAY EXAM CHEST 1 VIEW: CPT

## 2020-06-26 PROCEDURE — 93005 ELECTROCARDIOGRAM TRACING: CPT | Performed by: EMERGENCY MEDICINE

## 2020-06-26 NOTE — ED PROVIDER NOTES
Neutrophils Absolute 7.07 1.80 - 7.30 E9/L    Immature Granulocytes # 0.04 E9/L    Lymphocytes Absolute 0.94 (L) 1.50 - 4.00 E9/L    Monocytes Absolute 0.76 0.10 - 0.95 E9/L    Eosinophils Absolute 0.07 0.05 - 0.50 E9/L    Basophils Absolute 0.05 0.00 - 0.20 E9/L   Comprehensive Metabolic Panel w/ Reflex to MG   Result Value Ref Range    Sodium 140 132 - 146 mmol/L    Potassium reflex Magnesium 4.1 3.5 - 5.0 mmol/L    Chloride 96 (L) 98 - 107 mmol/L    CO2 34 (H) 22 - 29 mmol/L    Anion Gap 10 7 - 16 mmol/L    Glucose 257 (H) 74 - 99 mg/dL    BUN 13 8 - 23 mg/dL    CREATININE 0.8 0.7 - 1.2 mg/dL    GFR Non-African American >60 >=60 mL/min/1.73    GFR African American >60     Calcium 9.5 8.6 - 10.2 mg/dL    Total Protein 7.3 6.4 - 8.3 g/dL    Alb 4.0 3.5 - 5.2 g/dL    Total Bilirubin 0.5 0.0 - 1.2 mg/dL    Alkaline Phosphatase 76 40 - 129 U/L    ALT 8 0 - 40 U/L    AST 15 0 - 39 U/L   Troponin   Result Value Ref Range    Troponin <0.01 0.00 - 0.03 ng/mL   Protime-INR   Result Value Ref Range    Protime 17.9 (H) 9.3 - 12.4 sec    INR 1.6    APTT   Result Value Ref Range    aPTT 38.3 (H) 24.5 - 35.1 sec   Urinalysis, reflex to microscopic   Result Value Ref Range    Color, UA Yellow Straw/Yellow    Clarity, UA Clear Clear    Glucose, Ur 500 (A) Negative mg/dL    Bilirubin Urine Negative Negative    Ketones, Urine Negative Negative mg/dL    Specific Gravity, UA 1.010 1.005 - 1.030    Blood, Urine SMALL (A) Negative    pH, UA 7.0 5.0 - 9.0    Protein, UA Negative Negative mg/dL    Urobilinogen, Urine 0.2 <2.0 E.U./dL    Nitrite, Urine Negative Negative    Leukocyte Esterase, Urine Negative Negative   Microscopic Urinalysis   Result Value Ref Range    WBC, UA NONE 0 - 5 /HPF    RBC, UA 2-5 0 - 2 /HPF    Bacteria, UA NONE SEEN None Seen /HPF       RADIOLOGY:  Interpreted by Radiologist.  XR CHEST PORTABLE   Final Result      Mild cardiomegaly with median sternotomy      Elevation of right hemidiaphragm suggesting of an counseling regarding the diagnosis and prognosis. Questions are answered at this time and they are agreeable with the plan.       --------------------------------- IMPRESSION AND DISPOSITION ---------------------------------    IMPRESSION  1. Near syncope    2. Palpitations        DISPOSITION  Disposition: Discharge to home  Patient condition is stable    NOTE: This report was transcribed using voice recognition software.  Every effort was made to ensure accuracy; however, inadvertent computerized transcription errors may be present        Kashmir Pringle MD  06/26/20 0696

## 2020-06-26 NOTE — ED NOTES
Called report to [facility] VLADISLAV BUSH LPN at [facility] for patient to return.      Alicia Davenport RN  06/26/20 5685

## 2020-06-27 ENCOUNTER — CARE COORDINATION (OUTPATIENT)
Dept: CARE COORDINATION | Age: 85
End: 2020-06-27

## 2020-06-27 LAB
EKG ATRIAL RATE: 111 BPM
EKG Q-T INTERVAL: 402 MS
EKG QRS DURATION: 94 MS
EKG QTC CALCULATION (BAZETT): 424 MS
EKG R AXIS: 31 DEGREES
EKG T AXIS: -4 DEGREES
EKG VENTRICULAR RATE: 67 BPM

## 2020-06-27 PROCEDURE — 93010 ELECTROCARDIOGRAM REPORT: CPT | Performed by: INTERNAL MEDICINE

## 2020-06-27 NOTE — CARE COORDINATION
-ACM called [address] [place] [address] Rd, spoke to Siri Perkins, nurse. Siri Perkins said patient is a resident at this Assisted Living facility. -ACM will sign off and resolve COVID 19 monitoring episode.  -Director Care Coordination notified.

## 2020-08-13 ENCOUNTER — TELEPHONE (OUTPATIENT)
Dept: SURGERY | Age: 85
End: 2020-08-13

## 2020-08-18 ENCOUNTER — TELEPHONE (OUTPATIENT)
Dept: SURGERY | Age: 85
End: 2020-08-18

## 2020-08-18 NOTE — TELEPHONE ENCOUNTER
Dipika French, from [facility] called asking if any updated information from discussion with Tumor Board regarding mass. Olu An stated daughter was inquiring as well. Please advise.

## 2020-08-19 NOTE — TELEPHONE ENCOUNTER
Spoke to daughter Belgica Galicia. She will be expecting a call from Dr Sandra Boucher tomorrow 8/20/2020. Please reach her at [phone].

## 2020-08-19 NOTE — TELEPHONE ENCOUNTER
Office spoke to VIPUL, nurse @[facility], aware daughter will be speaking to [facility].

## 2020-08-20 ENCOUNTER — VIRTUAL VISIT (OUTPATIENT)
Dept: SURGERY | Age: 85
End: 2020-08-20

## 2020-08-20 PROCEDURE — 99999 PR OFFICE/OUTPT VISIT,PROCEDURE ONLY: CPT | Performed by: PLASTIC SURGERY

## 2020-08-20 NOTE — PROGRESS NOTES
Estrella Jang is a 80 y.o. male evaluated via telephone on 8/20/2020. Consent:  He and/or health care decision maker is aware that that he may receive a bill for this telephone service, depending on his insurance coverage, and has provided verbal consent to proceed: Yes      Documentation:  I communicated with the patient and/or health care decision maker about our recent tumor board meeting to discuss his vertex scalp tumor and the possibility of an eccrine component to this. 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 Furthermore, upon additional review of the pathology, there is a separate tumor just at the border of the surgical margin and this should be monitored closely. I did also discuss with them that radiation oncology has offered a visit to them to discuss radiation protocols in the event that this truly is an equine component with a high recurrence rate. Magdalena Patel did inform me that the family had discussed these potential scenarios and that they are not keen on anything such as radiation or any aggressive treatments and that they would like to continue to monitor the area clinically. They state that they have been in discussion with the nursing facility staff and that they note no changes in the vertex scalp operative site. I have offered a visit in my office so that I may be able to assess the area for any evidence of recurrence which then may lead to additional small procedure or potentially a change in their opinion on radiation to the vertex scalp. I informed the family that I did address the agent orange question at tumor board and it was felt that agent orange was not involved in the etiology of this cancer as this is in a sun exposed area and is most likely the inciting factor for its commencement.     The family voiced appreciation for the call and the updates. They state that their father is depressed and would like to wait a month to 6 weeks prior to a visit in my office in hopes that his attitude improves. Details of this discussion including any medical advice provided:       I affirm this is a Patient Initiated Episode with a Patients POA who has not had a related appointment within my department in the past 7 days or scheduled within the next 24 hours.     Patient identification was verified at the start of the visit: Yes FABIEN Meek    Total Time: minutes: <5 minutes (not billable)    Note: not billable if this call serves to triage the patient into an appointment for the relevant concern      Scot Brittle

## 2020-12-21 PROBLEM — F32.A CHRONIC DEPRESSION: Status: ACTIVE | Noted: 2020-12-21

## 2021-01-01 ENCOUNTER — APPOINTMENT (OUTPATIENT)
Dept: ULTRASOUND IMAGING | Age: 86
End: 2021-01-01
Payer: MEDICARE

## 2021-01-01 ENCOUNTER — HOSPITAL ENCOUNTER (EMERGENCY)
Age: 86
Discharge: LEFT BEFORE DISCHARGE | End: 2021-11-30
Attending: EMERGENCY MEDICINE
Payer: MEDICARE

## 2021-01-01 ENCOUNTER — HOSPITAL ENCOUNTER (EMERGENCY)
Age: 86
Discharge: HOME | End: 2021-10-08
Attending: EMERGENCY MEDICINE
Payer: MEDICARE

## 2021-01-01 VITALS
TEMPERATURE: 98 F | RESPIRATION RATE: 20 BRPM | DIASTOLIC BLOOD PRESSURE: 68 MMHG | OXYGEN SATURATION: 96 % | SYSTOLIC BLOOD PRESSURE: 125 MMHG | HEART RATE: 74 BPM | BODY MASS INDEX: 28.7 KG/M2 | WEIGHT: 200 LBS

## 2021-01-01 VITALS
TEMPERATURE: 98.6 F | WEIGHT: 205 LBS | HEART RATE: 78 BPM | OXYGEN SATURATION: 100 % | SYSTOLIC BLOOD PRESSURE: 141 MMHG | HEIGHT: 70 IN | DIASTOLIC BLOOD PRESSURE: 70 MMHG | BODY MASS INDEX: 29.35 KG/M2 | RESPIRATION RATE: 15 BRPM

## 2021-01-01 DIAGNOSIS — L03.116 CELLULITIS OF LEFT LOWER EXTREMITY: Primary | ICD-10-CM

## 2021-01-01 LAB
ALBUMIN SERPL-MCNC: 3.7 G/DL (ref 3.5–5.2)
ALP BLD-CCNC: 96 U/L (ref 40–129)
ALT SERPL-CCNC: 5 U/L (ref 0–40)
ANION GAP SERPL CALCULATED.3IONS-SCNC: 10 MMOL/L (ref 7–16)
ANION GAP SERPL CALCULATED.3IONS-SCNC: 9 MMOL/L (ref 7–16)
APTT: 36 SEC (ref 24.5–35.1)
AST SERPL-CCNC: 11 U/L (ref 0–39)
BASOPHILS ABSOLUTE: 0.05 E9/L (ref 0–0.2)
BASOPHILS ABSOLUTE: 0.07 E9/L (ref 0–0.2)
BASOPHILS RELATIVE PERCENT: 0.7 % (ref 0–2)
BASOPHILS RELATIVE PERCENT: 0.9 % (ref 0–2)
BILIRUB SERPL-MCNC: 0.4 MG/DL (ref 0–1.2)
BLOOD CULTURE, ROUTINE: NORMAL
BUN BLDV-MCNC: 15 MG/DL (ref 6–23)
BUN BLDV-MCNC: 21 MG/DL (ref 6–23)
CALCIUM SERPL-MCNC: 9.1 MG/DL (ref 8.6–10.2)
CALCIUM SERPL-MCNC: 9.2 MG/DL (ref 8.6–10.2)
CHLORIDE BLD-SCNC: 100 MMOL/L (ref 98–107)
CHLORIDE BLD-SCNC: 98 MMOL/L (ref 98–107)
CO2: 31 MMOL/L (ref 22–29)
CO2: 31 MMOL/L (ref 22–29)
CREAT SERPL-MCNC: 1.3 MG/DL (ref 0.7–1.2)
CREAT SERPL-MCNC: 1.4 MG/DL (ref 0.7–1.2)
CULTURE, BLOOD 2: NORMAL
EKG ATRIAL RATE: 68 BPM
EKG Q-T INTERVAL: 388 MS
EKG QRS DURATION: 94 MS
EKG QTC CALCULATION (BAZETT): 406 MS
EKG R AXIS: 4 DEGREES
EKG T AXIS: -10 DEGREES
EKG VENTRICULAR RATE: 66 BPM
EOSINOPHILS ABSOLUTE: 0.33 E9/L (ref 0.05–0.5)
EOSINOPHILS ABSOLUTE: 0.51 E9/L (ref 0.05–0.5)
EOSINOPHILS RELATIVE PERCENT: 4.1 % (ref 0–6)
EOSINOPHILS RELATIVE PERCENT: 6.7 % (ref 0–6)
GFR AFRICAN AMERICAN: 58
GFR AFRICAN AMERICAN: >60
GFR NON-AFRICAN AMERICAN: 48 ML/MIN/1.73
GFR NON-AFRICAN AMERICAN: 52 ML/MIN/1.73
GLUCOSE BLD-MCNC: 180 MG/DL (ref 74–99)
GLUCOSE BLD-MCNC: 76 MG/DL (ref 74–99)
HCT VFR BLD CALC: 29.4 % (ref 37–54)
HCT VFR BLD CALC: 30 % (ref 37–54)
HEMOGLOBIN: 9.1 G/DL (ref 12.5–16.5)
HEMOGLOBIN: 9.2 G/DL (ref 12.5–16.5)
IMMATURE GRANULOCYTES #: 0.03 E9/L
IMMATURE GRANULOCYTES #: 0.04 E9/L
IMMATURE GRANULOCYTES %: 0.4 % (ref 0–5)
IMMATURE GRANULOCYTES %: 0.5 % (ref 0–5)
INR BLD: 1.6
LACTIC ACID: 1 MMOL/L (ref 0.5–2.2)
LIPASE: 36 U/L (ref 13–60)
LYMPHOCYTES ABSOLUTE: 0.96 E9/L (ref 1.5–4)
LYMPHOCYTES ABSOLUTE: 1.17 E9/L (ref 1.5–4)
LYMPHOCYTES RELATIVE PERCENT: 12.5 % (ref 20–42)
LYMPHOCYTES RELATIVE PERCENT: 14.4 % (ref 20–42)
MAGNESIUM: 1.7 MG/DL (ref 1.6–2.6)
MCH RBC QN AUTO: 25.4 PG (ref 26–35)
MCH RBC QN AUTO: 26.8 PG (ref 26–35)
MCHC RBC AUTO-ENTMCNC: 30.7 % (ref 32–34.5)
MCHC RBC AUTO-ENTMCNC: 31 % (ref 32–34.5)
MCV RBC AUTO: 82.9 FL (ref 80–99.9)
MCV RBC AUTO: 86.7 FL (ref 80–99.9)
MONOCYTES ABSOLUTE: 0.83 E9/L (ref 0.1–0.95)
MONOCYTES ABSOLUTE: 0.89 E9/L (ref 0.1–0.95)
MONOCYTES RELATIVE PERCENT: 10.8 % (ref 2–12)
MONOCYTES RELATIVE PERCENT: 10.9 % (ref 2–12)
NEUTROPHILS ABSOLUTE: 5.27 E9/L (ref 1.8–7.3)
NEUTROPHILS ABSOLUTE: 5.64 E9/L (ref 1.8–7.3)
NEUTROPHILS RELATIVE PERCENT: 68.8 % (ref 43–80)
NEUTROPHILS RELATIVE PERCENT: 69.3 % (ref 43–80)
PDW BLD-RTO: 15.3 FL (ref 11.5–15)
PDW BLD-RTO: 16.7 FL (ref 11.5–15)
PLATELET # BLD: 221 E9/L (ref 130–450)
PLATELET # BLD: 258 E9/L (ref 130–450)
PMV BLD AUTO: 8.5 FL (ref 7–12)
PMV BLD AUTO: 8.8 FL (ref 7–12)
POTASSIUM REFLEX MAGNESIUM: 4.1 MMOL/L (ref 3.5–5)
POTASSIUM SERPL-SCNC: 4.1 MMOL/L (ref 3.5–5)
PRO-BNP: 1769 PG/ML (ref 0–450)
PROTHROMBIN TIME: 17.3 SEC (ref 9.3–12.4)
RBC # BLD: 3.39 E12/L (ref 3.8–5.8)
RBC # BLD: 3.62 E12/L (ref 3.8–5.8)
SODIUM BLD-SCNC: 139 MMOL/L (ref 132–146)
SODIUM BLD-SCNC: 140 MMOL/L (ref 132–146)
TOTAL PROTEIN: 6.9 G/DL (ref 6.4–8.3)
WBC # BLD: 7.7 E9/L (ref 4.5–11.5)
WBC # BLD: 8.1 E9/L (ref 4.5–11.5)

## 2021-01-01 PROCEDURE — 93971 EXTREMITY STUDY: CPT

## 2021-01-01 PROCEDURE — 80053 COMPREHEN METABOLIC PANEL: CPT

## 2021-01-01 PROCEDURE — 83690 ASSAY OF LIPASE: CPT

## 2021-01-01 PROCEDURE — 85730 THROMBOPLASTIN TIME PARTIAL: CPT

## 2021-01-01 PROCEDURE — 85610 PROTHROMBIN TIME: CPT

## 2021-01-01 PROCEDURE — 85025 COMPLETE CBC W/AUTO DIFF WBC: CPT

## 2021-01-01 PROCEDURE — 93971 EXTREMITY STUDY: CPT | Performed by: RADIOLOGY

## 2021-01-01 PROCEDURE — 93005 ELECTROCARDIOGRAM TRACING: CPT | Performed by: EMERGENCY MEDICINE

## 2021-01-01 PROCEDURE — 83605 ASSAY OF LACTIC ACID: CPT

## 2021-01-01 PROCEDURE — 83735 ASSAY OF MAGNESIUM: CPT

## 2021-01-01 PROCEDURE — 4500000002 HC ER NO CHARGE

## 2021-01-01 PROCEDURE — 87040 BLOOD CULTURE FOR BACTERIA: CPT

## 2021-01-01 PROCEDURE — 80048 BASIC METABOLIC PNL TOTAL CA: CPT

## 2021-01-01 PROCEDURE — 36415 COLL VENOUS BLD VENIPUNCTURE: CPT

## 2021-01-01 PROCEDURE — 99285 EMERGENCY DEPT VISIT HI MDM: CPT

## 2021-01-01 PROCEDURE — 83880 ASSAY OF NATRIURETIC PEPTIDE: CPT

## 2021-01-01 PROCEDURE — 93010 ELECTROCARDIOGRAM REPORT: CPT | Performed by: INTERNAL MEDICINE

## 2021-01-01 RX ORDER — CEPHALEXIN 500 MG/1
500 CAPSULE ORAL 4 TIMES DAILY
Qty: 28 CAPSULE | Refills: 0 | Status: SHIPPED | OUTPATIENT
Start: 2021-01-01 | End: 2021-01-01

## 2021-01-01 ASSESSMENT — PAIN DESCRIPTION - DESCRIPTORS: DESCRIPTORS: BURNING;ACHING

## 2021-01-01 ASSESSMENT — PAIN DESCRIPTION - ORIENTATION: ORIENTATION: LEFT

## 2021-01-01 ASSESSMENT — PAIN SCALES - GENERAL: PAINLEVEL_OUTOF10: 2

## 2021-01-01 ASSESSMENT — PAIN DESCRIPTION - LOCATION: LOCATION: LEG

## 2021-01-25 ENCOUNTER — TELEPHONE (OUTPATIENT)
Dept: SURGERY | Age: 86
End: 2021-01-25

## 2021-01-25 NOTE — TELEPHONE ENCOUNTER
Called patients sister to schedule f/up for Mr. Johnny Nieto she stated due to Covid vaccine not completing and assisting living facility this is not a good time to come to our office, she would like for us to call her in one month to schedule.

## 2021-10-08 NOTE — ED NOTES
Bed:  HA  Expected date:   Expected time:   Means of arrival:   Comments:  noemí Haji RN  10/08/21 1123

## 2021-10-08 NOTE — ED PROVIDER NOTES
HPI:  10/8/21,   Time: 11:41 AM EDT       Gino Paul is a 80 y.o. male presenting to the ED for left leg red and swollen, beginning 5 days ago. The complaint has been persistent, mild in severity, and worsened by nothing. No hx same. Bib ems. No fever/chills/sweats/nv/cp/abd pain/sob/cough. Nothing makes better. No falls/injuries    Review of Systems:   Pertinent positives and negatives are stated within HPI, all other systems reviewed and are negative.          --------------------------------------------- PAST HISTORY ---------------------------------------------  Past Medical History:  has a past medical history of Atherosclerotic heart disease, Atrial fibrillation ([address] CAD (coronary artery disease), COPD (chronic obstructive pulmonary disease) ([address] Diabetes mellitus ([address] Hearing difficulty, Hyperlipidemia, Hypertension, Major depressive disorder, Pulmonary hypertension ([address] Skin ulcer of back with fat layer exposed ([address] and Traumatic hematoma of lower back. Past Surgical History:  has a past surgical history that includes Coronary artery bypass graft (?); Abdominal aortic aneurysm repair (?); joint replacement; knee surgery; brain surgery (2008?); pacemaker placement (1996 2016); and pre-malignant / benign skin lesion excision (Left, 2/20/2020). Social History:  reports that he quit smoking about 47 years ago. His smoking use included cigarettes. He quit after 30.00 years of use. He has never used smokeless tobacco. He reports that he does not drink alcohol and does not use drugs. Family History: family history is not on file. The patients home medications have been reviewed. Allergies: Patient has no known allergies.         ---------------------------------------------------PHYSICAL EXAM--------------------------------------    Constitutional/General: Alert and oriented x3, well appearing, non toxic in NAD  Head: Normocephalic and atraumatic  Eyes: PERRL, EOMI, conjunctive normal, sclera non icteric  Mouth: Oropharynx clear, handling secretions,  Neck: Supple, full ROM,  Respiratory:  Not in respiratory distress  Cardiovascular:  2+ distal pulses  Chest: No chest wall tenderness  GI:  Abdomen Soft, Non tender, Non distended. Musculoskeletal: Moves all extremities x 4. Warm and well perfused, left calf swollen and erythematous, nvi distal. Capillary refill <3 seconds  Integument: skin warm and dry. No rashes. Lymphatic: no lymphadenopathy noted  Neurologic: GCS 15, no focal deficits, symmetric strength 5/5 in the upper and lower extremities bilaterally  Psychiatric: Normal Affect    -------------------------------------------------- RESULTS -------------------------------------------------  I have personally reviewed all laboratory and imaging results for this patient. Results are listed below.      LABS:  Results for orders placed or performed during the hospital encounter of 10/08/21   CBC Auto Differential   Result Value Ref Range    WBC 7.7 4.5 - 11.5 E9/L    RBC 3.39 (L) 3.80 - 5.80 E12/L    Hemoglobin 9.1 (L) 12.5 - 16.5 g/dL    Hematocrit 29.4 (L) 37.0 - 54.0 %    MCV 86.7 80.0 - 99.9 fL    MCH 26.8 26.0 - 35.0 pg    MCHC 31.0 (L) 32.0 - 34.5 %    RDW 15.3 (H) 11.5 - 15.0 fL    Platelets 812 580 - 910 E9/L    MPV 8.5 7.0 - 12.0 fL    Neutrophils % 68.8 43.0 - 80.0 %    Immature Granulocytes % 0.5 0.0 - 5.0 %    Lymphocytes % 12.5 (L) 20.0 - 42.0 %    Monocytes % 10.8 2.0 - 12.0 %    Eosinophils % 6.7 (H) 0.0 - 6.0 %    Basophils % 0.7 0.0 - 2.0 %    Neutrophils Absolute 5.27 1.80 - 7.30 E9/L    Immature Granulocytes # 0.04 E9/L    Lymphocytes Absolute 0.96 (L) 1.50 - 4.00 E9/L    Monocytes Absolute 0.83 0.10 - 0.95 E9/L    Eosinophils Absolute 0.51 (H) 0.05 - 0.50 E9/L    Basophils Absolute 0.05 0.00 - 0.20 R9/V   Basic Metabolic Panel w/ Reflex to MG   Result Value Ref Range    Sodium 140 132 - 146 mmol/L    Potassium reflex Magnesium 4.1 3.5 - 5.0 mmol/L    Chloride 100 98 - 107 mmol/L    CO2 31 (H) 22 - 29 mmol/L    Anion Gap 9 7 - 16 mmol/L    Glucose 180 (H) 74 - 99 mg/dL    BUN 15 6 - 23 mg/dL    CREATININE 1.3 (H) 0.7 - 1.2 mg/dL    GFR Non-African American 52 >=60 mL/min/1.73    GFR African American >60     Calcium 9.2 8.6 - 10.2 mg/dL   Protime-INR   Result Value Ref Range    Protime 17.3 (H) 9.3 - 12.4 sec    INR 1.6    APTT   Result Value Ref Range    aPTT 36.0 (H) 24.5 - 35.1 sec       RADIOLOGY:  Interpreted by Radiologist.  US DUP LOWER EXTREMITY LEFT ISAAC   Final Result   No evidence of DVT in the left lower extremity. EKG:  This EKG is signed and interpreted by the EP. Time: 1138  Rate: 65  Rhythm: Atrial fibrillation  Interpretation: atrial fibrillation (chronic)  Comparison: None      ------------------------- NURSING NOTES AND VITALS REVIEWED ---------------------------   The nursing notes within the ED encounter and vital signs as below have been reviewed by myself. BP (!) 141/74   Pulse 80   Resp 19   Ht 5' 10\" (1.778 m)   Wt 205 lb (93 kg)   SpO2 100%   BMI 29.41 kg/m²   Oxygen Saturation Interpretation: Normal    The patients available past medical records and past encounters were reviewed. ------------------------------ ED COURSE/MEDICAL DECISION MAKING----------------------  Medications - No data to display      ED COURSE:       Medical Decision Making:    Us neg for dvt, cellulitis clinically, pt on coumadin, inr 1.6, will have monitor inr closely and dc on keflex      This patient's ED course included: a personal history and physicial examination    This patient has remained hemodynamically stable during their ED course. Re-Evaluations:             Re-evaluation.   Patients symptoms show no change    Re-examination  10/8/21   2pm   EDT          Vital Signs:   Vitals:    10/08/21 1126   BP: (!) 141/74   Pulse: 80   Resp: 19   SpO2: 100%   Weight: 205 lb (93 kg)   Height: 5' 10\" (1.778 m)           Consultations: Critical Care:         Counseling: The emergency provider has spoken with the patient and discussed todays results, in addition to providing specific details for the plan of care and counseling regarding the diagnosis and prognosis. Questions are answered at this time and they are agreeable with the plan.       --------------------------------- IMPRESSION AND DISPOSITION ---------------------------------    IMPRESSION  1. Cellulitis of left lower extremity        DISPOSITION  Disposition: Discharge to home  Patient condition is stable    NOTE: This report was transcribed using voice recognition software.  Every effort was made to ensure accuracy; however, inadvertent computerized transcription errors may be present        Nithya Enriquez MD  10/08/21 2838

## 2021-11-30 NOTE — ED NOTES
FIRST PROVIDER CONTACT ASSESSMENT NOTE      Department of Emergency Medicine   Admit Date: No admission date for patient encounter. Chief Complaint: Wound Check (left shin wound, seeping, swelling, from [facility], also L eye swelling, DM)      History of Present Illness:    Siria Palm is a 80 y.o. male who presents to the ED for unhealing wound to the left lower extremity denies any fever. He is currently at a extended care facility. He has been fully vaccinated. He is O2 dependent and has oxygen on at this time.         -----------------END OF FIRST PROVIDER CONTACT ASSESSMENT NOTE--------------  Electronically signed by MONTSERRAT Forman CNP   DD: 11/30/21               MONTSERRAT Baker CNP  11/30/21 [id number]

## 2022-01-01 ENCOUNTER — HOSPITAL ENCOUNTER (OUTPATIENT)
Age: 87
Setting detail: OBSERVATION
Discharge: HOME | End: 2022-04-20
Attending: STUDENT IN AN ORGANIZED HEALTH CARE EDUCATION/TRAINING PROGRAM | Admitting: FAMILY MEDICINE
Payer: MEDICARE

## 2022-01-01 ENCOUNTER — HOSPITAL ENCOUNTER (EMERGENCY)
Age: 87
Discharge: HOME | End: 2022-01-05
Attending: STUDENT IN AN ORGANIZED HEALTH CARE EDUCATION/TRAINING PROGRAM
Payer: MEDICARE

## 2022-01-01 ENCOUNTER — TELEPHONE (OUTPATIENT)
Dept: CARDIOLOGY CLINIC | Age: 87
End: 2022-01-01

## 2022-01-01 ENCOUNTER — TELEPHONE (OUTPATIENT)
Dept: CARDIOLOGY | Age: 87
End: 2022-01-01

## 2022-01-01 ENCOUNTER — APPOINTMENT (OUTPATIENT)
Dept: CT IMAGING | Age: 87
End: 2022-01-01
Payer: MEDICARE

## 2022-01-01 ENCOUNTER — HOSPITAL ENCOUNTER (INPATIENT)
Age: 87
LOS: 12 days | Discharge: NURSING, REHAB OR LONG-TERM CARE | DRG: 871 | End: 2022-07-13
Attending: EMERGENCY MEDICINE | Admitting: FAMILY MEDICINE
Payer: MEDICARE

## 2022-01-01 ENCOUNTER — APPOINTMENT (OUTPATIENT)
Dept: GENERAL RADIOLOGY | Age: 87
End: 2022-01-01
Payer: MEDICARE

## 2022-01-01 ENCOUNTER — HOSPITAL ENCOUNTER (EMERGENCY)
Age: 87
Discharge: HOME | End: 2022-07-01
Attending: EMERGENCY MEDICINE
Payer: COMMERCIAL

## 2022-01-01 ENCOUNTER — APPOINTMENT (OUTPATIENT)
Dept: CT IMAGING | Age: 87
End: 2022-01-01
Payer: COMMERCIAL

## 2022-01-01 ENCOUNTER — APPOINTMENT (OUTPATIENT)
Dept: GENERAL RADIOLOGY | Age: 87
End: 2022-01-01
Payer: COMMERCIAL

## 2022-01-01 VITALS
DIASTOLIC BLOOD PRESSURE: 51 MMHG | TEMPERATURE: 97.8 F | HEART RATE: 78 BPM | OXYGEN SATURATION: 93 % | WEIGHT: 200 LBS | SYSTOLIC BLOOD PRESSURE: 121 MMHG | HEIGHT: 70 IN | BODY MASS INDEX: 28.63 KG/M2 | RESPIRATION RATE: 16 BRPM

## 2022-01-01 VITALS
OXYGEN SATURATION: 100 % | SYSTOLIC BLOOD PRESSURE: 128 MMHG | WEIGHT: 187.4 LBS | HEART RATE: 85 BPM | BODY MASS INDEX: 26.83 KG/M2 | HEIGHT: 70 IN | DIASTOLIC BLOOD PRESSURE: 66 MMHG | RESPIRATION RATE: 20 BRPM | TEMPERATURE: 97.1 F

## 2022-01-01 VITALS
SYSTOLIC BLOOD PRESSURE: 140 MMHG | HEART RATE: 90 BPM | DIASTOLIC BLOOD PRESSURE: 70 MMHG | TEMPERATURE: 98.4 F | RESPIRATION RATE: 15 BRPM | OXYGEN SATURATION: 95 %

## 2022-01-01 VITALS
RESPIRATION RATE: 18 BRPM | OXYGEN SATURATION: 94 % | HEART RATE: 97 BPM | DIASTOLIC BLOOD PRESSURE: 87 MMHG | HEIGHT: 70 IN | SYSTOLIC BLOOD PRESSURE: 145 MMHG | BODY MASS INDEX: 26.48 KG/M2 | WEIGHT: 185 LBS | TEMPERATURE: 98.8 F

## 2022-01-01 DIAGNOSIS — N39.0 URINARY TRACT INFECTION WITHOUT HEMATURIA, SITE UNSPECIFIED: Primary | ICD-10-CM

## 2022-01-01 DIAGNOSIS — G93.40 ENCEPHALOPATHY: ICD-10-CM

## 2022-01-01 DIAGNOSIS — B37.9 YEAST INFECTION: ICD-10-CM

## 2022-01-01 DIAGNOSIS — Z95.0 PACEMAKER: ICD-10-CM

## 2022-01-01 DIAGNOSIS — N30.00 ACUTE CYSTITIS WITHOUT HEMATURIA: ICD-10-CM

## 2022-01-01 DIAGNOSIS — N30.01 ACUTE CYSTITIS WITH HEMATURIA: ICD-10-CM

## 2022-01-01 DIAGNOSIS — R41.82 ALTERED MENTAL STATUS, UNSPECIFIED ALTERED MENTAL STATUS TYPE: Primary | ICD-10-CM

## 2022-01-01 DIAGNOSIS — N30.01 ACUTE CYSTITIS WITH HEMATURIA: Primary | ICD-10-CM

## 2022-01-01 DIAGNOSIS — I48.91 ATRIAL FIBRILLATION, UNSPECIFIED TYPE (HCC): Primary | ICD-10-CM

## 2022-01-01 DIAGNOSIS — E16.2 HYPOGLYCEMIA: Primary | ICD-10-CM

## 2022-01-01 LAB
ALBUMIN SERPL-MCNC: 2.8 G/DL (ref 3.5–5.2)
ALBUMIN SERPL-MCNC: 2.9 G/DL (ref 3.5–5.2)
ALBUMIN SERPL-MCNC: 2.9 G/DL (ref 3.5–5.2)
ALBUMIN SERPL-MCNC: 3.1 G/DL (ref 3.5–5.2)
ALBUMIN SERPL-MCNC: 3.3 G/DL (ref 3.5–5.2)
ALBUMIN SERPL-MCNC: 3.4 G/DL (ref 3.5–5.2)
ALBUMIN SERPL-MCNC: 3.6 G/DL (ref 3.5–5.2)
ALP BLD-CCNC: 71 U/L (ref 40–129)
ALP BLD-CCNC: 75 U/L (ref 40–129)
ALP BLD-CCNC: 81 U/L (ref 40–129)
ALP BLD-CCNC: 94 U/L (ref 40–129)
ALP BLD-CCNC: 99 U/L (ref 40–129)
ALT SERPL-CCNC: 5 U/L (ref 0–40)
ALT SERPL-CCNC: 7 U/L (ref 0–40)
ALT SERPL-CCNC: <5 U/L (ref 0–40)
ANION GAP SERPL CALCULATED.3IONS-SCNC: 10 MMOL/L (ref 7–16)
ANION GAP SERPL CALCULATED.3IONS-SCNC: 10 MMOL/L (ref 7–16)
ANION GAP SERPL CALCULATED.3IONS-SCNC: 12 MMOL/L (ref 7–16)
ANION GAP SERPL CALCULATED.3IONS-SCNC: 12 MMOL/L (ref 7–16)
ANION GAP SERPL CALCULATED.3IONS-SCNC: 13 MMOL/L (ref 7–16)
ANION GAP SERPL CALCULATED.3IONS-SCNC: 14 MMOL/L (ref 7–16)
ANION GAP SERPL CALCULATED.3IONS-SCNC: 16 MMOL/L (ref 7–16)
ANION GAP SERPL CALCULATED.3IONS-SCNC: 9 MMOL/L (ref 7–16)
ANISOCYTOSIS: ABNORMAL
AST SERPL-CCNC: 10 U/L (ref 0–39)
AST SERPL-CCNC: 10 U/L (ref 0–39)
AST SERPL-CCNC: 12 U/L (ref 0–39)
AST SERPL-CCNC: 13 U/L (ref 0–39)
AST SERPL-CCNC: 20 U/L (ref 0–39)
AST SERPL-CCNC: 7 U/L (ref 0–39)
AST SERPL-CCNC: 7 U/L (ref 0–39)
BACTERIA: ABNORMAL /HPF
BASOPHILS ABSOLUTE: 0 E9/L (ref 0–0.2)
BASOPHILS ABSOLUTE: 0.06 E9/L (ref 0–0.2)
BASOPHILS ABSOLUTE: 0.07 E9/L (ref 0–0.2)
BASOPHILS ABSOLUTE: 0.07 E9/L (ref 0–0.2)
BASOPHILS ABSOLUTE: 0.08 E9/L (ref 0–0.2)
BASOPHILS ABSOLUTE: 0.09 E9/L (ref 0–0.2)
BASOPHILS RELATIVE PERCENT: 0.2 % (ref 0–2)
BASOPHILS RELATIVE PERCENT: 0.4 % (ref 0–2)
BASOPHILS RELATIVE PERCENT: 0.4 % (ref 0–2)
BASOPHILS RELATIVE PERCENT: 0.5 % (ref 0–2)
BASOPHILS RELATIVE PERCENT: 0.6 % (ref 0–2)
BASOPHILS RELATIVE PERCENT: 0.6 % (ref 0–2)
BASOPHILS RELATIVE PERCENT: 0.7 % (ref 0–2)
BASOPHILS RELATIVE PERCENT: 0.8 % (ref 0–2)
BILIRUB SERPL-MCNC: 0.4 MG/DL (ref 0–1.2)
BILIRUB SERPL-MCNC: 0.4 MG/DL (ref 0–1.2)
BILIRUB SERPL-MCNC: 0.5 MG/DL (ref 0–1.2)
BILIRUB SERPL-MCNC: 0.6 MG/DL (ref 0–1.2)
BILIRUBIN URINE: NEGATIVE
BLOOD CULTURE, ROUTINE: NORMAL
BLOOD, URINE: ABNORMAL
BUN BLDV-MCNC: 10 MG/DL (ref 6–23)
BUN BLDV-MCNC: 10 MG/DL (ref 6–23)
BUN BLDV-MCNC: 12 MG/DL (ref 6–23)
BUN BLDV-MCNC: 16 MG/DL (ref 6–23)
BUN BLDV-MCNC: 17 MG/DL (ref 6–23)
BUN BLDV-MCNC: 18 MG/DL (ref 6–23)
BUN BLDV-MCNC: 20 MG/DL (ref 6–23)
BUN BLDV-MCNC: 21 MG/DL (ref 6–23)
BURR CELLS: ABNORMAL
CALCIUM SERPL-MCNC: 8.1 MG/DL (ref 8.6–10.2)
CALCIUM SERPL-MCNC: 8.3 MG/DL (ref 8.6–10.2)
CALCIUM SERPL-MCNC: 8.4 MG/DL (ref 8.6–10.2)
CALCIUM SERPL-MCNC: 8.5 MG/DL (ref 8.6–10.2)
CALCIUM SERPL-MCNC: 8.9 MG/DL (ref 8.6–10.2)
CALCIUM SERPL-MCNC: 9 MG/DL (ref 8.6–10.2)
CHLORIDE BLD-SCNC: 100 MMOL/L (ref 98–107)
CHLORIDE BLD-SCNC: 100 MMOL/L (ref 98–107)
CHLORIDE BLD-SCNC: 101 MMOL/L (ref 98–107)
CHLORIDE BLD-SCNC: 103 MMOL/L (ref 98–107)
CHLORIDE BLD-SCNC: 98 MMOL/L (ref 98–107)
CHLORIDE BLD-SCNC: 98 MMOL/L (ref 98–107)
CHP ED QC CHECK: NORMAL
CHP ED QC CHECK: YES
CLARITY: ABNORMAL
CLARITY: CLEAR
CLARITY: CLEAR
CO2: 22 MMOL/L (ref 22–29)
CO2: 24 MMOL/L (ref 22–29)
CO2: 25 MMOL/L (ref 22–29)
CO2: 26 MMOL/L (ref 22–29)
CO2: 27 MMOL/L (ref 22–29)
CO2: 29 MMOL/L (ref 22–29)
COLOR: ABNORMAL
COLOR: YELLOW
COLOR: YELLOW
CREAT SERPL-MCNC: 1 MG/DL (ref 0.7–1.2)
CREAT SERPL-MCNC: 1.1 MG/DL (ref 0.7–1.2)
CREAT SERPL-MCNC: 1.2 MG/DL (ref 0.7–1.2)
CREAT SERPL-MCNC: 1.3 MG/DL (ref 0.7–1.2)
CREAT SERPL-MCNC: 1.4 MG/DL (ref 0.7–1.2)
CREAT SERPL-MCNC: 1.4 MG/DL (ref 0.7–1.2)
CULTURE, BLOOD 2: NORMAL
CULTURE, BLOOD 2: NORMAL
EKG ATRIAL RATE: 102 BPM
EKG ATRIAL RATE: 120 BPM
EKG ATRIAL RATE: 312 BPM
EKG ATRIAL RATE: 80 BPM
EKG Q-T INTERVAL: 336 MS
EKG Q-T INTERVAL: 336 MS
EKG Q-T INTERVAL: 344 MS
EKG Q-T INTERVAL: 374 MS
EKG QRS DURATION: 100 MS
EKG QRS DURATION: 100 MS
EKG QRS DURATION: 92 MS
EKG QRS DURATION: 94 MS
EKG QTC CALCULATION (BAZETT): 413 MS
EKG QTC CALCULATION (BAZETT): 420 MS
EKG QTC CALCULATION (BAZETT): 427 MS
EKG QTC CALCULATION (BAZETT): 441 MS
EKG R AXIS: 16 DEGREES
EKG R AXIS: 19 DEGREES
EKG R AXIS: 21 DEGREES
EKG R AXIS: 36 DEGREES
EKG T AXIS: -16 DEGREES
EKG T AXIS: -37 DEGREES
EKG T AXIS: -37 DEGREES
EKG T AXIS: -89 DEGREES
EKG VENTRICULAR RATE: 84 BPM
EKG VENTRICULAR RATE: 91 BPM
EKG VENTRICULAR RATE: 93 BPM
EKG VENTRICULAR RATE: 94 BPM
EOSINOPHILS ABSOLUTE: 0 E9/L (ref 0.05–0.5)
EOSINOPHILS ABSOLUTE: 0.24 E9/L (ref 0.05–0.5)
EOSINOPHILS ABSOLUTE: 0.34 E9/L (ref 0.05–0.5)
EOSINOPHILS ABSOLUTE: 0.64 E9/L (ref 0.05–0.5)
EOSINOPHILS ABSOLUTE: 0.65 E9/L (ref 0.05–0.5)
EOSINOPHILS ABSOLUTE: 0.69 E9/L (ref 0.05–0.5)
EOSINOPHILS ABSOLUTE: 0.8 E9/L (ref 0.05–0.5)
EOSINOPHILS ABSOLUTE: 0.84 E9/L (ref 0.05–0.5)
EOSINOPHILS RELATIVE PERCENT: 0.2 % (ref 0–6)
EOSINOPHILS RELATIVE PERCENT: 1.5 % (ref 0–6)
EOSINOPHILS RELATIVE PERCENT: 2.4 % (ref 0–6)
EOSINOPHILS RELATIVE PERCENT: 5.2 % (ref 0–6)
EOSINOPHILS RELATIVE PERCENT: 5.3 % (ref 0–6)
EOSINOPHILS RELATIVE PERCENT: 5.7 % (ref 0–6)
EOSINOPHILS RELATIVE PERCENT: 5.7 % (ref 0–6)
EOSINOPHILS RELATIVE PERCENT: 6 % (ref 0–6)
FERRITIN: 73 NG/ML
GFR AFRICAN AMERICAN: 57
GFR AFRICAN AMERICAN: 58
GFR AFRICAN AMERICAN: >60
GFR NON-AFRICAN AMERICAN: 47 ML/MIN/1.73
GFR NON-AFRICAN AMERICAN: 48 ML/MIN/1.73
GFR NON-AFRICAN AMERICAN: 52 ML/MIN/1.73
GFR NON-AFRICAN AMERICAN: 57 ML/MIN/1.73
GFR NON-AFRICAN AMERICAN: >60 ML/MIN/1.73
GFR NON-AFRICAN AMERICAN: >60 ML/MIN/1.73
GLUCOSE BLD-MCNC: 115 MG/DL (ref 74–99)
GLUCOSE BLD-MCNC: 131 MG/DL (ref 74–99)
GLUCOSE BLD-MCNC: 134 MG/DL (ref 74–99)
GLUCOSE BLD-MCNC: 139 MG/DL
GLUCOSE BLD-MCNC: 141 MG/DL
GLUCOSE BLD-MCNC: 149 MG/DL (ref 74–99)
GLUCOSE BLD-MCNC: 150 MG/DL (ref 74–99)
GLUCOSE BLD-MCNC: 49 MG/DL (ref 74–99)
GLUCOSE BLD-MCNC: 73 MG/DL (ref 74–99)
GLUCOSE BLD-MCNC: 97 MG/DL (ref 74–99)
GLUCOSE URINE: NEGATIVE MG/DL
HCT VFR BLD CALC: 27.9 % (ref 37–54)
HCT VFR BLD CALC: 28.7 % (ref 37–54)
HCT VFR BLD CALC: 29.6 % (ref 37–54)
HCT VFR BLD CALC: 31 % (ref 37–54)
HCT VFR BLD CALC: 31.1 % (ref 37–54)
HCT VFR BLD CALC: 31.2 % (ref 37–54)
HCT VFR BLD CALC: 31.6 % (ref 37–54)
HCT VFR BLD CALC: 31.8 % (ref 37–54)
HEMOGLOBIN: 8.4 G/DL (ref 12.5–16.5)
HEMOGLOBIN: 8.6 G/DL (ref 12.5–16.5)
HEMOGLOBIN: 8.7 G/DL (ref 12.5–16.5)
HEMOGLOBIN: 9.2 G/DL (ref 12.5–16.5)
HEMOGLOBIN: 9.3 G/DL (ref 12.5–16.5)
HEMOGLOBIN: 9.6 G/DL (ref 12.5–16.5)
HEMOGLOBIN: 9.8 G/DL (ref 12.5–16.5)
HEMOGLOBIN: 9.8 G/DL (ref 12.5–16.5)
HYPOCHROMIA: ABNORMAL
IMMATURE GRANULOCYTES #: 0.06 E9/L
IMMATURE GRANULOCYTES #: 0.07 E9/L
IMMATURE GRANULOCYTES #: 0.07 E9/L
IMMATURE GRANULOCYTES #: 0.08 E9/L
IMMATURE GRANULOCYTES #: 0.08 E9/L
IMMATURE GRANULOCYTES #: 0.09 E9/L
IMMATURE GRANULOCYTES #: 0.09 E9/L
IMMATURE GRANULOCYTES %: 0.5 % (ref 0–5)
IMMATURE GRANULOCYTES %: 0.6 % (ref 0–5)
INFLUENZA A: NOT DETECTED
INFLUENZA B: NOT DETECTED
IRON SATURATION: 14 % (ref 20–55)
IRON: 28 MCG/DL (ref 59–158)
KETONES, URINE: 15 MG/DL
KETONES, URINE: ABNORMAL MG/DL
KETONES, URINE: NEGATIVE MG/DL
LACTIC ACID, SEPSIS: 0.8 MMOL/L (ref 0.5–1.9)
LACTIC ACID, SEPSIS: 0.8 MMOL/L (ref 0.5–1.9)
LACTIC ACID, SEPSIS: 1.2 MMOL/L (ref 0.5–1.9)
LACTIC ACID: 1.2 MMOL/L (ref 0.5–2.2)
LEUKOCYTE ESTERASE, URINE: ABNORMAL
LV EF: 60 %
LVEF MODALITY: NORMAL
LYMPHOCYTES ABSOLUTE: 0.31 E9/L (ref 1.5–4)
LYMPHOCYTES ABSOLUTE: 1.11 E9/L (ref 1.5–4)
LYMPHOCYTES ABSOLUTE: 1.17 E9/L (ref 1.5–4)
LYMPHOCYTES ABSOLUTE: 1.39 E9/L (ref 1.5–4)
LYMPHOCYTES ABSOLUTE: 1.45 E9/L (ref 1.5–4)
LYMPHOCYTES ABSOLUTE: 1.46 E9/L (ref 1.5–4)
LYMPHOCYTES ABSOLUTE: 1.71 E9/L (ref 1.5–4)
LYMPHOCYTES ABSOLUTE: 1.74 E9/L (ref 1.5–4)
LYMPHOCYTES RELATIVE PERCENT: 11.7 % (ref 20–42)
LYMPHOCYTES RELATIVE PERCENT: 12 % (ref 20–42)
LYMPHOCYTES RELATIVE PERCENT: 12.1 % (ref 20–42)
LYMPHOCYTES RELATIVE PERCENT: 12.7 % (ref 20–42)
LYMPHOCYTES RELATIVE PERCENT: 7 % (ref 20–42)
LYMPHOCYTES RELATIVE PERCENT: 7.2 % (ref 20–42)
LYMPHOCYTES RELATIVE PERCENT: 8.5 % (ref 20–42)
LYMPHOCYTES RELATIVE PERCENT: 9.9 % (ref 20–42)
MAGNESIUM: 1.6 MG/DL (ref 1.6–2.6)
MCH RBC QN AUTO: 21.7 PG (ref 26–35)
MCH RBC QN AUTO: 22.1 PG (ref 26–35)
MCH RBC QN AUTO: 22.3 PG (ref 26–35)
MCH RBC QN AUTO: 23.2 PG (ref 26–35)
MCH RBC QN AUTO: 23.6 PG (ref 26–35)
MCH RBC QN AUTO: 24.9 PG (ref 26–35)
MCHC RBC AUTO-ENTMCNC: 29.4 % (ref 32–34.5)
MCHC RBC AUTO-ENTMCNC: 29.7 % (ref 32–34.5)
MCHC RBC AUTO-ENTMCNC: 29.9 % (ref 32–34.5)
MCHC RBC AUTO-ENTMCNC: 30 % (ref 32–34.5)
MCHC RBC AUTO-ENTMCNC: 30.1 % (ref 32–34.5)
MCHC RBC AUTO-ENTMCNC: 30.2 % (ref 32–34.5)
MCHC RBC AUTO-ENTMCNC: 31 % (ref 32–34.5)
MCHC RBC AUTO-ENTMCNC: 31.4 % (ref 32–34.5)
MCV RBC AUTO: 72.5 FL (ref 80–99.9)
MCV RBC AUTO: 73.4 FL (ref 80–99.9)
MCV RBC AUTO: 73.6 FL (ref 80–99.9)
MCV RBC AUTO: 75 FL (ref 80–99.9)
MCV RBC AUTO: 75.1 FL (ref 80–99.9)
MCV RBC AUTO: 75.1 FL (ref 80–99.9)
MCV RBC AUTO: 76.8 FL (ref 80–99.9)
MCV RBC AUTO: 80.2 FL (ref 80–99.9)
METER GLUCOSE: 116 MG/DL (ref 74–99)
METER GLUCOSE: 125 MG/DL (ref 74–99)
METER GLUCOSE: 130 MG/DL (ref 74–99)
METER GLUCOSE: 139 MG/DL (ref 74–99)
METER GLUCOSE: 143 MG/DL (ref 74–99)
METER GLUCOSE: 161 MG/DL (ref 74–99)
METER GLUCOSE: 174 MG/DL (ref 74–99)
METER GLUCOSE: 180 MG/DL (ref 74–99)
METER GLUCOSE: 184 MG/DL (ref 74–99)
METER GLUCOSE: 189 MG/DL (ref 74–99)
METER GLUCOSE: 190 MG/DL (ref 74–99)
METER GLUCOSE: 197 MG/DL (ref 74–99)
METER GLUCOSE: 205 MG/DL (ref 74–99)
METER GLUCOSE: 213 MG/DL (ref 74–99)
METER GLUCOSE: 234 MG/DL (ref 74–99)
METER GLUCOSE: 255 MG/DL (ref 74–99)
METER GLUCOSE: 51 MG/DL (ref 74–99)
METER GLUCOSE: 55 MG/DL (ref 74–99)
METER GLUCOSE: 78 MG/DL (ref 74–99)
METER GLUCOSE: 96 MG/DL (ref 74–99)
MONOCYTES ABSOLUTE: 0.7 E9/L (ref 0.1–0.95)
MONOCYTES ABSOLUTE: 1.03 E9/L (ref 0.1–0.95)
MONOCYTES ABSOLUTE: 1.12 E9/L (ref 0.1–0.95)
MONOCYTES ABSOLUTE: 1.14 E9/L (ref 0.1–0.95)
MONOCYTES ABSOLUTE: 1.34 E9/L (ref 0.1–0.95)
MONOCYTES ABSOLUTE: 1.39 E9/L (ref 0.1–0.95)
MONOCYTES ABSOLUTE: 1.46 E9/L (ref 0.1–0.95)
MONOCYTES ABSOLUTE: 1.47 E9/L (ref 0.1–0.95)
MONOCYTES RELATIVE PERCENT: 10.2 % (ref 2–12)
MONOCYTES RELATIVE PERCENT: 10.3 % (ref 2–12)
MONOCYTES RELATIVE PERCENT: 10.4 % (ref 2–12)
MONOCYTES RELATIVE PERCENT: 15.6 % (ref 2–12)
MONOCYTES RELATIVE PERCENT: 7 % (ref 2–12)
MONOCYTES RELATIVE PERCENT: 9 % (ref 2–12)
MONOCYTES RELATIVE PERCENT: 9 % (ref 2–12)
MONOCYTES RELATIVE PERCENT: 9.9 % (ref 2–12)
NEUTROPHILS ABSOLUTE: 10.03 E9/L (ref 1.8–7.3)
NEUTROPHILS ABSOLUTE: 10.14 E9/L (ref 1.8–7.3)
NEUTROPHILS ABSOLUTE: 10.74 E9/L (ref 1.8–7.3)
NEUTROPHILS ABSOLUTE: 13.66 E9/L (ref 1.8–7.3)
NEUTROPHILS ABSOLUTE: 3.39 E9/L (ref 1.8–7.3)
NEUTROPHILS ABSOLUTE: 8.17 E9/L (ref 1.8–7.3)
NEUTROPHILS ABSOLUTE: 9.02 E9/L (ref 1.8–7.3)
NEUTROPHILS ABSOLUTE: 9.74 E9/L (ref 1.8–7.3)
NEUTROPHILS RELATIVE PERCENT: 71.2 % (ref 43–80)
NEUTROPHILS RELATIVE PERCENT: 71.3 % (ref 43–80)
NEUTROPHILS RELATIVE PERCENT: 72.5 % (ref 43–80)
NEUTROPHILS RELATIVE PERCENT: 72.8 % (ref 43–80)
NEUTROPHILS RELATIVE PERCENT: 74.4 % (ref 43–80)
NEUTROPHILS RELATIVE PERCENT: 74.9 % (ref 43–80)
NEUTROPHILS RELATIVE PERCENT: 77.4 % (ref 43–80)
NEUTROPHILS RELATIVE PERCENT: 83.4 % (ref 43–80)
NITRITE, URINE: NEGATIVE
NITRITE, URINE: NEGATIVE
NITRITE, URINE: POSITIVE
PDW BLD-RTO: 16.8 FL (ref 11.5–15)
PDW BLD-RTO: 18 FL (ref 11.5–15)
PDW BLD-RTO: 18.1 FL (ref 11.5–15)
PDW BLD-RTO: 19.3 FL (ref 11.5–15)
PDW BLD-RTO: 19.4 FL (ref 11.5–15)
PDW BLD-RTO: 19.7 FL (ref 11.5–15)
PH UA: 5.5 (ref 5–9)
PH UA: 6 (ref 5–9)
PH UA: 7.5 (ref 5–9)
PLATELET # BLD: 147 E9/L (ref 130–450)
PLATELET # BLD: 250 E9/L (ref 130–450)
PLATELET # BLD: 259 E9/L (ref 130–450)
PLATELET # BLD: 277 E9/L (ref 130–450)
PLATELET # BLD: 290 E9/L (ref 130–450)
PLATELET # BLD: 297 E9/L (ref 130–450)
PLATELET # BLD: 300 E9/L (ref 130–450)
PLATELET # BLD: 306 E9/L (ref 130–450)
PMV BLD AUTO: 8.8 FL (ref 7–12)
PMV BLD AUTO: 8.9 FL (ref 7–12)
PMV BLD AUTO: 9 FL (ref 7–12)
PMV BLD AUTO: 9 FL (ref 7–12)
PMV BLD AUTO: 9.2 FL (ref 7–12)
PMV BLD AUTO: 9.3 FL (ref 7–12)
POIKILOCYTES: ABNORMAL
POLYCHROMASIA: ABNORMAL
POTASSIUM REFLEX MAGNESIUM: 3.4 MMOL/L (ref 3.5–5)
POTASSIUM REFLEX MAGNESIUM: 3.6 MMOL/L (ref 3.5–5)
POTASSIUM REFLEX MAGNESIUM: 3.7 MMOL/L (ref 3.5–5)
POTASSIUM REFLEX MAGNESIUM: 3.8 MMOL/L (ref 3.5–5)
POTASSIUM REFLEX MAGNESIUM: 4 MMOL/L (ref 3.5–5)
POTASSIUM REFLEX MAGNESIUM: 4 MMOL/L (ref 3.5–5)
POTASSIUM REFLEX MAGNESIUM: 4.2 MMOL/L (ref 3.5–5)
POTASSIUM SERPL-SCNC: 3.6 MMOL/L (ref 3.5–5)
PRO-BNP: 2067 PG/ML (ref 0–450)
PROTEIN UA: 100 MG/DL
PROTEIN UA: 100 MG/DL
PROTEIN UA: ABNORMAL MG/DL
RBC # BLD: 3.8 E12/L (ref 3.8–5.8)
RBC # BLD: 3.9 E12/L (ref 3.8–5.8)
RBC # BLD: 3.94 E12/L (ref 3.8–5.8)
RBC # BLD: 3.94 E12/L (ref 3.8–5.8)
RBC # BLD: 4.13 E12/L (ref 3.8–5.8)
RBC # BLD: 4.14 E12/L (ref 3.8–5.8)
RBC # BLD: 4.16 E12/L (ref 3.8–5.8)
RBC # BLD: 4.29 E12/L (ref 3.8–5.8)
RBC UA: ABNORMAL /HPF (ref 0–2)
SARS-COV-2 RNA, RT PCR: NOT DETECTED
SARS-COV-2, NAAT: NOT DETECTED
SARS-COV-2, NAAT: NOT DETECTED
SODIUM BLD-SCNC: 134 MMOL/L (ref 132–146)
SODIUM BLD-SCNC: 138 MMOL/L (ref 132–146)
SODIUM BLD-SCNC: 140 MMOL/L (ref 132–146)
SODIUM BLD-SCNC: 141 MMOL/L (ref 132–146)
SPECIFIC GRAVITY UA: 1.01 (ref 1–1.03)
SPECIFIC GRAVITY UA: 1.02 (ref 1–1.03)
SPECIFIC GRAVITY UA: 1.02 (ref 1–1.03)
TARGET CELLS: ABNORMAL
TOTAL IRON BINDING CAPACITY: 203 MCG/DL (ref 250–450)
TOTAL PROTEIN: 6.2 G/DL (ref 6.4–8.3)
TOTAL PROTEIN: 6.3 G/DL (ref 6.4–8.3)
TOTAL PROTEIN: 6.4 G/DL (ref 6.4–8.3)
TOTAL PROTEIN: 6.5 G/DL (ref 6.4–8.3)
TOTAL PROTEIN: 6.7 G/DL (ref 6.4–8.3)
TOTAL PROTEIN: 6.9 G/DL (ref 6.4–8.3)
TOTAL PROTEIN: 7.2 G/DL (ref 6.4–8.3)
TROPONIN, HIGH SENSITIVITY: 39 NG/L (ref 0–11)
TROPONIN, HIGH SENSITIVITY: 45 NG/L (ref 0–11)
TROPONIN, HIGH SENSITIVITY: 62 NG/L (ref 0–11)
TROPONIN, HIGH SENSITIVITY: 63 NG/L (ref 0–11)
TROPONIN, HIGH SENSITIVITY: 66 NG/L (ref 0–11)
TROPONIN, HIGH SENSITIVITY: 68 NG/L (ref 0–11)
URINE CULTURE, ROUTINE: NORMAL
URINE CULTURE, ROUTINE: NORMAL
UROBILINOGEN, URINE: 0.2 E.U./DL
UROBILINOGEN, URINE: 0.2 E.U./DL
UROBILINOGEN, URINE: 1 E.U./DL
WBC # BLD: 11.5 E9/L (ref 4.5–11.5)
WBC # BLD: 12.4 E9/L (ref 4.5–11.5)
WBC # BLD: 13 E9/L (ref 4.5–11.5)
WBC # BLD: 14 E9/L (ref 4.5–11.5)
WBC # BLD: 14.1 E9/L (ref 4.5–11.5)
WBC # BLD: 14.4 E9/L (ref 4.5–11.5)
WBC # BLD: 16.4 E9/L (ref 4.5–11.5)
WBC # BLD: 4.4 E9/L (ref 4.5–11.5)
WBC UA: >20 /HPF (ref 0–5)
WBC UA: ABNORMAL /HPF (ref 0–5)
WBC UA: ABNORMAL /HPF (ref 0–5)
YEAST: PRESENT /HPF

## 2022-01-01 PROCEDURE — 84484 ASSAY OF TROPONIN QUANT: CPT

## 2022-01-01 PROCEDURE — 97530 THERAPEUTIC ACTIVITIES: CPT

## 2022-01-01 PROCEDURE — 87088 URINE BACTERIA CULTURE: CPT

## 2022-01-01 PROCEDURE — 6360000002 HC RX W HCPCS: Performed by: INTERNAL MEDICINE

## 2022-01-01 PROCEDURE — 99285 EMERGENCY DEPT VISIT HI MDM: CPT

## 2022-01-01 PROCEDURE — 96374 THER/PROPH/DIAG INJ IV PUSH: CPT

## 2022-01-01 PROCEDURE — 6360000002 HC RX W HCPCS: Performed by: FAMILY MEDICINE

## 2022-01-01 PROCEDURE — 1200000000 HC SEMI PRIVATE

## 2022-01-01 PROCEDURE — 2700000000 HC OXYGEN THERAPY PER DAY

## 2022-01-01 PROCEDURE — 96361 HYDRATE IV INFUSION ADD-ON: CPT

## 2022-01-01 PROCEDURE — 81001 URINALYSIS AUTO W/SCOPE: CPT

## 2022-01-01 PROCEDURE — 2580000003 HC RX 258: Performed by: FAMILY MEDICINE

## 2022-01-01 PROCEDURE — 6370000000 HC RX 637 (ALT 250 FOR IP): Performed by: FAMILY MEDICINE

## 2022-01-01 PROCEDURE — 6370000000 HC RX 637 (ALT 250 FOR IP): Performed by: STUDENT IN AN ORGANIZED HEALTH CARE EDUCATION/TRAINING PROGRAM

## 2022-01-01 PROCEDURE — 82962 GLUCOSE BLOOD TEST: CPT

## 2022-01-01 PROCEDURE — 94640 AIRWAY INHALATION TREATMENT: CPT

## 2022-01-01 PROCEDURE — 71045 X-RAY EXAM CHEST 1 VIEW: CPT

## 2022-01-01 PROCEDURE — 87635 SARS-COV-2 COVID-19 AMP PRB: CPT

## 2022-01-01 PROCEDURE — 97165 OT EVAL LOW COMPLEX 30 MIN: CPT

## 2022-01-01 PROCEDURE — 97161 PT EVAL LOW COMPLEX 20 MIN: CPT

## 2022-01-01 PROCEDURE — 6370000000 HC RX 637 (ALT 250 FOR IP): Performed by: INTERNAL MEDICINE

## 2022-01-01 PROCEDURE — 70450 CT HEAD/BRAIN W/O DYE: CPT

## 2022-01-01 PROCEDURE — 99222 1ST HOSP IP/OBS MODERATE 55: CPT | Performed by: INTERNAL MEDICINE

## 2022-01-01 PROCEDURE — 36415 COLL VENOUS BLD VENIPUNCTURE: CPT

## 2022-01-01 PROCEDURE — 87040 BLOOD CULTURE FOR BACTERIA: CPT

## 2022-01-01 PROCEDURE — 83605 ASSAY OF LACTIC ACID: CPT

## 2022-01-01 PROCEDURE — 96365 THER/PROPH/DIAG IV INF INIT: CPT

## 2022-01-01 PROCEDURE — 83735 ASSAY OF MAGNESIUM: CPT

## 2022-01-01 PROCEDURE — 83880 ASSAY OF NATRIURETIC PEPTIDE: CPT

## 2022-01-01 PROCEDURE — 85025 COMPLETE CBC W/AUTO DIFF WBC: CPT

## 2022-01-01 PROCEDURE — 2500000003 HC RX 250 WO HCPCS: Performed by: STUDENT IN AN ORGANIZED HEALTH CARE EDUCATION/TRAINING PROGRAM

## 2022-01-01 PROCEDURE — 93005 ELECTROCARDIOGRAM TRACING: CPT | Performed by: EMERGENCY MEDICINE

## 2022-01-01 PROCEDURE — 2580000003 HC RX 258: Performed by: EMERGENCY MEDICINE

## 2022-01-01 PROCEDURE — 80053 COMPREHEN METABOLIC PANEL: CPT

## 2022-01-01 PROCEDURE — 87636 SARSCOV2 & INF A&B AMP PRB: CPT

## 2022-01-01 PROCEDURE — 2580000003 HC RX 258: Performed by: STUDENT IN AN ORGANIZED HEALTH CARE EDUCATION/TRAINING PROGRAM

## 2022-01-01 PROCEDURE — 82728 ASSAY OF FERRITIN: CPT

## 2022-01-01 PROCEDURE — 97535 SELF CARE MNGMENT TRAINING: CPT

## 2022-01-01 PROCEDURE — 2500000003 HC RX 250 WO HCPCS: Performed by: EMERGENCY MEDICINE

## 2022-01-01 PROCEDURE — 6360000002 HC RX W HCPCS: Performed by: EMERGENCY MEDICINE

## 2022-01-01 PROCEDURE — 93005 ELECTROCARDIOGRAM TRACING: CPT | Performed by: STUDENT IN AN ORGANIZED HEALTH CARE EDUCATION/TRAINING PROGRAM

## 2022-01-01 PROCEDURE — 51798 US URINE CAPACITY MEASURE: CPT

## 2022-01-01 PROCEDURE — 94664 DEMO&/EVAL PT USE INHALER: CPT

## 2022-01-01 PROCEDURE — 83550 IRON BINDING TEST: CPT

## 2022-01-01 PROCEDURE — G0378 HOSPITAL OBSERVATION PER HR: HCPCS

## 2022-01-01 PROCEDURE — 93306 TTE W/DOPPLER COMPLETE: CPT

## 2022-01-01 PROCEDURE — 6370000000 HC RX 637 (ALT 250 FOR IP): Performed by: EMERGENCY MEDICINE

## 2022-01-01 PROCEDURE — 80048 BASIC METABOLIC PNL TOTAL CA: CPT

## 2022-01-01 PROCEDURE — 96375 TX/PRO/DX INJ NEW DRUG ADDON: CPT

## 2022-01-01 PROCEDURE — APPSS45 APP SPLIT SHARED TIME 31-45 MINUTES: Performed by: NURSE PRACTITIONER

## 2022-01-01 PROCEDURE — 83540 ASSAY OF IRON: CPT

## 2022-01-01 PROCEDURE — 6370000000 HC RX 637 (ALT 250 FOR IP): Performed by: HOSPITALIST

## 2022-01-01 RX ORDER — DEXTROSE MONOHYDRATE 25 G/50ML
25 INJECTION, SOLUTION INTRAVENOUS ONCE
Status: COMPLETED | OUTPATIENT
Start: 2022-01-01 | End: 2022-01-01

## 2022-01-01 RX ORDER — MECLIZINE HCL 12.5 MG/1
25 TABLET ORAL 3 TIMES DAILY PRN
Status: DISCONTINUED | OUTPATIENT
Start: 2022-01-01 | End: 2022-01-01 | Stop reason: HOSPADM

## 2022-01-01 RX ORDER — SODIUM CHLORIDE 9 MG/ML
INJECTION, SOLUTION INTRAVENOUS PRN
Status: DISCONTINUED | OUTPATIENT
Start: 2022-01-01 | End: 2022-01-01 | Stop reason: HOSPADM

## 2022-01-01 RX ORDER — DEXTROSE MONOHYDRATE 25 G/50ML
25 INJECTION, SOLUTION INTRAVENOUS PRN
Status: DISCONTINUED | OUTPATIENT
Start: 2022-01-01 | End: 2022-01-01 | Stop reason: HOSPADM

## 2022-01-01 RX ORDER — DULOXETIN HYDROCHLORIDE 30 MG/1
30 CAPSULE, DELAYED RELEASE ORAL DAILY
Status: DISCONTINUED | OUTPATIENT
Start: 2022-01-01 | End: 2022-01-01 | Stop reason: HOSPADM

## 2022-01-01 RX ORDER — INSULIN LISPRO 100 [IU]/ML
0-3 INJECTION, SOLUTION INTRAVENOUS; SUBCUTANEOUS NIGHTLY
Status: DISCONTINUED | OUTPATIENT
Start: 2022-01-01 | End: 2022-01-01 | Stop reason: HOSPADM

## 2022-01-01 RX ORDER — POLYVINYL ALCOHOL 14 MG/ML
1 SOLUTION/ DROPS OPHTHALMIC 3 TIMES DAILY
Status: DISCONTINUED | OUTPATIENT
Start: 2022-01-01 | End: 2022-01-01 | Stop reason: HOSPADM

## 2022-01-01 RX ORDER — INSULIN GLARGINE-YFGN 100 [IU]/ML
9 INJECTION, SOLUTION SUBCUTANEOUS NIGHTLY
Status: DISCONTINUED | OUTPATIENT
Start: 2022-01-01 | End: 2022-01-01 | Stop reason: HOSPADM

## 2022-01-01 RX ORDER — SODIUM CHLORIDE 0.9 % (FLUSH) 0.9 %
10 SYRINGE (ML) INJECTION EVERY 12 HOURS SCHEDULED
Status: DISCONTINUED | OUTPATIENT
Start: 2022-01-01 | End: 2022-01-01 | Stop reason: HOSPADM

## 2022-01-01 RX ORDER — SODIUM CHLORIDE 0.9 % (FLUSH) 0.9 %
5-40 SYRINGE (ML) INJECTION PRN
Status: DISCONTINUED | OUTPATIENT
Start: 2022-01-01 | End: 2022-01-01 | Stop reason: HOSPADM

## 2022-01-01 RX ORDER — ACETAMINOPHEN 325 MG/1
650 TABLET ORAL ONCE
Status: COMPLETED | OUTPATIENT
Start: 2022-01-01 | End: 2022-01-01

## 2022-01-01 RX ORDER — DEXTROSE MONOHYDRATE 25 G/50ML
12.5 INJECTION, SOLUTION INTRAVENOUS PRN
Status: DISCONTINUED | OUTPATIENT
Start: 2022-01-01 | End: 2022-01-01 | Stop reason: HOSPADM

## 2022-01-01 RX ORDER — BUDESONIDE 0.5 MG/2ML
0.5 INHALANT ORAL 2 TIMES DAILY
Status: DISCONTINUED | OUTPATIENT
Start: 2022-01-01 | End: 2022-01-01 | Stop reason: HOSPADM

## 2022-01-01 RX ORDER — POTASSIUM CHLORIDE 20 MEQ/1
40 TABLET, EXTENDED RELEASE ORAL PRN
Status: DISCONTINUED | OUTPATIENT
Start: 2022-01-01 | End: 2022-01-01 | Stop reason: HOSPADM

## 2022-01-01 RX ORDER — TRAMADOL HYDROCHLORIDE 50 MG/1
50 TABLET ORAL EVERY 6 HOURS PRN
COMMUNITY

## 2022-01-01 RX ORDER — ACETAMINOPHEN 325 MG/1
650 TABLET ORAL EVERY 6 HOURS PRN
Status: DISCONTINUED | OUTPATIENT
Start: 2022-01-01 | End: 2022-01-01 | Stop reason: HOSPADM

## 2022-01-01 RX ORDER — AMLODIPINE BESYLATE 5 MG/1
5 TABLET ORAL DAILY
Status: DISCONTINUED | OUTPATIENT
Start: 2022-01-01 | End: 2022-01-01 | Stop reason: HOSPADM

## 2022-01-01 RX ORDER — INSULIN LISPRO 100 [IU]/ML
0-6 INJECTION, SOLUTION INTRAVENOUS; SUBCUTANEOUS
Status: DISCONTINUED | OUTPATIENT
Start: 2022-01-01 | End: 2022-01-01 | Stop reason: HOSPADM

## 2022-01-01 RX ORDER — ASPIRIN 81 MG/1
81 TABLET, CHEWABLE ORAL ONCE
Status: COMPLETED | OUTPATIENT
Start: 2022-01-01 | End: 2022-01-01

## 2022-01-01 RX ORDER — CEFDINIR 300 MG/1
300 CAPSULE ORAL 2 TIMES DAILY
Qty: 10 CAPSULE | Refills: 0 | Status: SHIPPED | OUTPATIENT
Start: 2022-01-01 | End: 2022-01-01

## 2022-01-01 RX ORDER — TAMSULOSIN HYDROCHLORIDE 0.4 MG/1
0.4 CAPSULE ORAL DAILY
Status: DISCONTINUED | OUTPATIENT
Start: 2022-01-01 | End: 2022-01-01 | Stop reason: HOSPADM

## 2022-01-01 RX ORDER — LISINOPRIL 20 MG/1
20 TABLET ORAL DAILY
Status: DISCONTINUED | OUTPATIENT
Start: 2022-01-01 | End: 2022-01-01

## 2022-01-01 RX ORDER — FLUTICASONE PROPIONATE 50 MCG
1 SPRAY, SUSPENSION (ML) NASAL DAILY
Status: DISCONTINUED | OUTPATIENT
Start: 2022-01-01 | End: 2022-01-01 | Stop reason: HOSPADM

## 2022-01-01 RX ORDER — MIRTAZAPINE 15 MG/1
7.5 TABLET, FILM COATED ORAL NIGHTLY
Status: DISCONTINUED | OUTPATIENT
Start: 2022-01-01 | End: 2022-01-01 | Stop reason: HOSPADM

## 2022-01-01 RX ORDER — 0.9 % SODIUM CHLORIDE 0.9 %
1000 INTRAVENOUS SOLUTION INTRAVENOUS ONCE
Status: COMPLETED | OUTPATIENT
Start: 2022-01-01 | End: 2022-01-01

## 2022-01-01 RX ORDER — LANOLIN ALCOHOL/MO/W.PET/CERES
3 CREAM (GRAM) TOPICAL NIGHTLY PRN
Status: DISCONTINUED | OUTPATIENT
Start: 2022-01-01 | End: 2022-01-01 | Stop reason: HOSPADM

## 2022-01-01 RX ORDER — METOPROLOL TARTRATE 50 MG/1
50 TABLET, FILM COATED ORAL 2 TIMES DAILY
Status: DISCONTINUED | OUTPATIENT
Start: 2022-01-01 | End: 2022-01-01 | Stop reason: HOSPADM

## 2022-01-01 RX ORDER — METOPROLOL TARTRATE 50 MG/1
100 TABLET, FILM COATED ORAL ONCE
Status: COMPLETED | OUTPATIENT
Start: 2022-01-01 | End: 2022-01-01

## 2022-01-01 RX ORDER — POTASSIUM CHLORIDE 7.45 MG/ML
10 INJECTION INTRAVENOUS PRN
Status: DISCONTINUED | OUTPATIENT
Start: 2022-01-01 | End: 2022-01-01 | Stop reason: HOSPADM

## 2022-01-01 RX ORDER — MAGNESIUM HYDROXIDE/ALUMINUM HYDROXICE/SIMETHICONE 120; 1200; 1200 MG/30ML; MG/30ML; MG/30ML
30 SUSPENSION ORAL EVERY 6 HOURS PRN
Status: DISCONTINUED | OUTPATIENT
Start: 2022-01-01 | End: 2022-01-01 | Stop reason: HOSPADM

## 2022-01-01 RX ORDER — CETIRIZINE HYDROCHLORIDE 10 MG/1
10 TABLET ORAL DAILY
Status: DISCONTINUED | OUTPATIENT
Start: 2022-01-01 | End: 2022-01-01 | Stop reason: HOSPADM

## 2022-01-01 RX ORDER — PROMETHAZINE HYDROCHLORIDE 12.5 MG/1
12.5 TABLET ORAL EVERY 6 HOURS PRN
Status: DISCONTINUED | OUTPATIENT
Start: 2022-01-01 | End: 2022-01-01 | Stop reason: HOSPADM

## 2022-01-01 RX ORDER — CEFDINIR 300 MG/1
300 CAPSULE ORAL 2 TIMES DAILY
Qty: 20 CAPSULE | Refills: 0 | Status: ON HOLD | OUTPATIENT
Start: 2022-01-01 | End: 2022-01-01

## 2022-01-01 RX ORDER — INSULIN LISPRO 100 [IU]/ML
0-12 INJECTION, SOLUTION INTRAVENOUS; SUBCUTANEOUS
Status: DISCONTINUED | OUTPATIENT
Start: 2022-01-01 | End: 2022-01-01 | Stop reason: HOSPADM

## 2022-01-01 RX ORDER — INSULIN LISPRO 100 [IU]/ML
0-6 INJECTION, SOLUTION INTRAVENOUS; SUBCUTANEOUS NIGHTLY
Status: DISCONTINUED | OUTPATIENT
Start: 2022-01-01 | End: 2022-01-01 | Stop reason: HOSPADM

## 2022-01-01 RX ORDER — INSULIN LISPRO 100 [IU]/ML
2 INJECTION, SOLUTION INTRAVENOUS; SUBCUTANEOUS
Status: DISCONTINUED | OUTPATIENT
Start: 2022-01-01 | End: 2022-01-01 | Stop reason: HOSPADM

## 2022-01-01 RX ORDER — PANTOPRAZOLE SODIUM 40 MG/1
40 TABLET, DELAYED RELEASE ORAL
Status: DISCONTINUED | OUTPATIENT
Start: 2022-01-01 | End: 2022-01-01 | Stop reason: HOSPADM

## 2022-01-01 RX ORDER — SODIUM CHLORIDE 0.9 % (FLUSH) 0.9 %
10 SYRINGE (ML) INJECTION PRN
Status: DISCONTINUED | OUTPATIENT
Start: 2022-01-01 | End: 2022-01-01 | Stop reason: HOSPADM

## 2022-01-01 RX ORDER — POLYETHYLENE GLYCOL 3350 17 G/17G
17 POWDER, FOR SOLUTION ORAL DAILY PRN
Status: DISCONTINUED | OUTPATIENT
Start: 2022-01-01 | End: 2022-01-01 | Stop reason: HOSPADM

## 2022-01-01 RX ORDER — SENNA PLUS 8.6 MG/1
1 TABLET ORAL NIGHTLY
Status: DISCONTINUED | OUTPATIENT
Start: 2022-01-01 | End: 2022-01-01 | Stop reason: HOSPADM

## 2022-01-01 RX ORDER — 0.9 % SODIUM CHLORIDE 0.9 %
500 INTRAVENOUS SOLUTION INTRAVENOUS ONCE
Status: COMPLETED | OUTPATIENT
Start: 2022-01-01 | End: 2022-01-01

## 2022-01-01 RX ORDER — IPRATROPIUM BROMIDE AND ALBUTEROL SULFATE 2.5; .5 MG/3ML; MG/3ML
1 SOLUTION RESPIRATORY (INHALATION)
Status: DISCONTINUED | OUTPATIENT
Start: 2022-01-01 | End: 2022-01-01 | Stop reason: HOSPADM

## 2022-01-01 RX ORDER — ACETAMINOPHEN 650 MG/1
650 SUPPOSITORY RECTAL EVERY 6 HOURS PRN
Status: DISCONTINUED | OUTPATIENT
Start: 2022-01-01 | End: 2022-01-01 | Stop reason: HOSPADM

## 2022-01-01 RX ORDER — SODIUM CHLORIDE 9 MG/ML
1000 INJECTION, SOLUTION INTRAVENOUS CONTINUOUS
Status: DISCONTINUED | OUTPATIENT
Start: 2022-01-01 | End: 2022-01-01 | Stop reason: HOSPADM

## 2022-01-01 RX ORDER — NICOTINE POLACRILEX 4 MG
15 LOZENGE BUCCAL PRN
Status: DISCONTINUED | OUTPATIENT
Start: 2022-01-01 | End: 2022-01-01 | Stop reason: HOSPADM

## 2022-01-01 RX ORDER — BUDESONIDE 0.5 MG/2ML
0.5 INHALANT ORAL 2 TIMES DAILY
Status: DISCONTINUED | OUTPATIENT
Start: 2022-01-01 | End: 2022-01-01

## 2022-01-01 RX ORDER — SODIUM CHLORIDE 9 MG/ML
INJECTION, SOLUTION INTRAVENOUS CONTINUOUS
Status: DISCONTINUED | OUTPATIENT
Start: 2022-01-01 | End: 2022-01-01 | Stop reason: HOSPADM

## 2022-01-01 RX ORDER — CEFDINIR 300 MG/1
300 CAPSULE ORAL ONCE
Status: COMPLETED | OUTPATIENT
Start: 2022-01-01 | End: 2022-01-01

## 2022-01-01 RX ORDER — ATORVASTATIN CALCIUM 10 MG/1
10 TABLET, FILM COATED ORAL DAILY
Status: DISCONTINUED | OUTPATIENT
Start: 2022-01-01 | End: 2022-01-01 | Stop reason: HOSPADM

## 2022-01-01 RX ORDER — ATORVASTATIN CALCIUM 10 MG/1
10 TABLET, FILM COATED ORAL DAILY
Status: DISCONTINUED | OUTPATIENT
Start: 2022-01-01 | End: 2022-01-01

## 2022-01-01 RX ORDER — ONDANSETRON 2 MG/ML
4 INJECTION INTRAMUSCULAR; INTRAVENOUS EVERY 6 HOURS PRN
Status: DISCONTINUED | OUTPATIENT
Start: 2022-01-01 | End: 2022-01-01 | Stop reason: HOSPADM

## 2022-01-01 RX ORDER — ENOXAPARIN SODIUM 100 MG/ML
40 INJECTION SUBCUTANEOUS DAILY
Status: DISCONTINUED | OUTPATIENT
Start: 2022-01-01 | End: 2022-01-01 | Stop reason: HOSPADM

## 2022-01-01 RX ORDER — SODIUM CHLORIDE 0.9 % (FLUSH) 0.9 %
5-40 SYRINGE (ML) INJECTION EVERY 12 HOURS SCHEDULED
Status: DISCONTINUED | OUTPATIENT
Start: 2022-01-01 | End: 2022-01-01 | Stop reason: HOSPADM

## 2022-01-01 RX ORDER — CETIRIZINE HYDROCHLORIDE 10 MG/1
5 TABLET ORAL DAILY
Status: DISCONTINUED | OUTPATIENT
Start: 2022-01-01 | End: 2022-01-01 | Stop reason: HOSPADM

## 2022-01-01 RX ORDER — FLUCONAZOLE 100 MG/1
200 TABLET ORAL ONCE
Status: DISCONTINUED | OUTPATIENT
Start: 2022-01-01 | End: 2022-01-01 | Stop reason: HOSPADM

## 2022-01-01 RX ORDER — HEPARIN SODIUM 10000 [USP'U]/ML
5000 INJECTION, SOLUTION INTRAVENOUS; SUBCUTANEOUS EVERY 8 HOURS
Status: DISCONTINUED | OUTPATIENT
Start: 2022-01-01 | End: 2022-01-01 | Stop reason: ALTCHOICE

## 2022-01-01 RX ORDER — LORAZEPAM 2 MG/ML
1 INJECTION INTRAMUSCULAR ONCE
Status: COMPLETED | OUTPATIENT
Start: 2022-01-01 | End: 2022-01-01

## 2022-01-01 RX ORDER — AMLODIPINE BESYLATE 5 MG/1
5 TABLET ORAL DAILY
Status: DISCONTINUED | OUTPATIENT
Start: 2022-01-01 | End: 2022-01-01

## 2022-01-01 RX ORDER — ASPIRIN 81 MG/1
81 TABLET ORAL DAILY
Status: DISCONTINUED | OUTPATIENT
Start: 2022-01-01 | End: 2022-01-01

## 2022-01-01 RX ORDER — CEFDINIR 300 MG/1
300 CAPSULE ORAL 2 TIMES DAILY
Qty: 14 CAPSULE | Refills: 0 | Status: SHIPPED | OUTPATIENT
Start: 2022-01-01 | End: 2022-01-01

## 2022-01-01 RX ORDER — ONDANSETRON 4 MG/1
4 TABLET, ORALLY DISINTEGRATING ORAL EVERY 8 HOURS PRN
Status: DISCONTINUED | OUTPATIENT
Start: 2022-01-01 | End: 2022-01-01 | Stop reason: HOSPADM

## 2022-01-01 RX ORDER — METOPROLOL TARTRATE 50 MG/1
100 TABLET, FILM COATED ORAL 2 TIMES DAILY
Status: DISCONTINUED | OUTPATIENT
Start: 2022-01-01 | End: 2022-01-01 | Stop reason: HOSPADM

## 2022-01-01 RX ORDER — DEXTROSE MONOHYDRATE 50 MG/ML
100 INJECTION, SOLUTION INTRAVENOUS PRN
Status: DISCONTINUED | OUTPATIENT
Start: 2022-01-01 | End: 2022-01-01 | Stop reason: HOSPADM

## 2022-01-01 RX ADMIN — INSULIN LISPRO 2 UNITS: 100 INJECTION, SOLUTION INTRAVENOUS; SUBCUTANEOUS at 11:54

## 2022-01-01 RX ADMIN — SODIUM CHLORIDE 500 ML: 9 INJECTION, SOLUTION INTRAVENOUS at 16:31

## 2022-01-01 RX ADMIN — CEFDINIR 300 MG: 300 CAPSULE ORAL at 01:10

## 2022-01-01 RX ADMIN — FLUTICASONE PROPIONATE 1 SPRAY: 50 SPRAY, METERED NASAL at 08:42

## 2022-01-01 RX ADMIN — AMLODIPINE BESYLATE 5 MG: 5 TABLET ORAL at 09:21

## 2022-01-01 RX ADMIN — SODIUM CHLORIDE 1000 ML: 9 INJECTION, SOLUTION INTRAVENOUS at 23:16

## 2022-01-01 RX ADMIN — SODIUM CHLORIDE, PRESERVATIVE FREE 10 ML: 5 INJECTION INTRAVENOUS at 09:18

## 2022-01-01 RX ADMIN — MIRTAZAPINE 7.5 MG: 15 TABLET, FILM COATED ORAL at 20:23

## 2022-01-01 RX ADMIN — IPRATROPIUM BROMIDE AND ALBUTEROL SULFATE 1 AMPULE: .5; 2.5 SOLUTION RESPIRATORY (INHALATION) at 11:19

## 2022-01-01 RX ADMIN — SODIUM CHLORIDE 1000 ML: 9 INJECTION, SOLUTION INTRAVENOUS at 22:37

## 2022-01-01 RX ADMIN — POTASSIUM BICARBONATE 40 MEQ: 782 TABLET, EFFERVESCENT ORAL at 13:23

## 2022-01-01 RX ADMIN — ENOXAPARIN SODIUM 40 MG: 100 INJECTION SUBCUTANEOUS at 10:26

## 2022-01-01 RX ADMIN — CETIRIZINE HYDROCHLORIDE 5 MG: 10 TABLET, FILM COATED ORAL at 09:19

## 2022-01-01 RX ADMIN — INSULIN LISPRO 1 UNITS: 100 INJECTION, SOLUTION INTRAVENOUS; SUBCUTANEOUS at 08:36

## 2022-01-01 RX ADMIN — INSULIN LISPRO 2 UNITS: 100 INJECTION, SOLUTION INTRAVENOUS; SUBCUTANEOUS at 11:40

## 2022-01-01 RX ADMIN — DULOXETINE HYDROCHLORIDE 30 MG: 30 CAPSULE, DELAYED RELEASE ORAL at 09:44

## 2022-01-01 RX ADMIN — POLYVINYL ALCOHOL 1 DROP: 14 SOLUTION/ DROPS OPHTHALMIC at 21:44

## 2022-01-01 RX ADMIN — SODIUM CHLORIDE, PRESERVATIVE FREE 10 ML: 5 INJECTION INTRAVENOUS at 21:07

## 2022-01-01 RX ADMIN — POLYVINYL ALCOHOL 1 DROP: 14 SOLUTION/ DROPS OPHTHALMIC at 13:27

## 2022-01-01 RX ADMIN — METOPROLOL TARTRATE 50 MG: 50 TABLET, FILM COATED ORAL at 20:20

## 2022-01-01 RX ADMIN — SODIUM CHLORIDE, PRESERVATIVE FREE 10 ML: 5 INJECTION INTRAVENOUS at 08:47

## 2022-01-01 RX ADMIN — DULOXETINE HYDROCHLORIDE 30 MG: 30 CAPSULE, DELAYED RELEASE ORAL at 09:45

## 2022-01-01 RX ADMIN — POTASSIUM BICARBONATE 40 MEQ: 782 TABLET, EFFERVESCENT ORAL at 14:15

## 2022-01-01 RX ADMIN — METOPROLOL TARTRATE 50 MG: 50 TABLET, FILM COATED ORAL at 22:36

## 2022-01-01 RX ADMIN — POLYVINYL ALCOHOL 1 DROP: 14 SOLUTION/ DROPS OPHTHALMIC at 14:16

## 2022-01-01 RX ADMIN — POLYVINYL ALCOHOL 1 DROP: 14 SOLUTION/ DROPS OPHTHALMIC at 21:08

## 2022-01-01 RX ADMIN — SODIUM CHLORIDE, PRESERVATIVE FREE 10 ML: 5 INJECTION INTRAVENOUS at 20:26

## 2022-01-01 RX ADMIN — MIRTAZAPINE 7.5 MG: 15 TABLET, FILM COATED ORAL at 20:48

## 2022-01-01 RX ADMIN — ENOXAPARIN SODIUM 40 MG: 100 INJECTION SUBCUTANEOUS at 08:34

## 2022-01-01 RX ADMIN — MIRTAZAPINE 7.5 MG: 15 TABLET, FILM COATED ORAL at 20:50

## 2022-01-01 RX ADMIN — SODIUM CHLORIDE, PRESERVATIVE FREE 10 ML: 5 INJECTION INTRAVENOUS at 21:43

## 2022-01-01 RX ADMIN — APIXABAN 5 MG: 5 TABLET, FILM COATED ORAL at 09:44

## 2022-01-01 RX ADMIN — ATORVASTATIN CALCIUM 10 MG: 10 TABLET, FILM COATED ORAL at 09:19

## 2022-01-01 RX ADMIN — TAMSULOSIN HYDROCHLORIDE 0.4 MG: 0.4 CAPSULE ORAL at 09:44

## 2022-01-01 RX ADMIN — POLYVINYL ALCOHOL 1 DROP: 14 SOLUTION/ DROPS OPHTHALMIC at 14:50

## 2022-01-01 RX ADMIN — TAMSULOSIN HYDROCHLORIDE 0.4 MG: 0.4 CAPSULE ORAL at 09:15

## 2022-01-01 RX ADMIN — WATER 1000 MG: 1 INJECTION INTRAMUSCULAR; INTRAVENOUS; SUBCUTANEOUS at 06:36

## 2022-01-01 RX ADMIN — APIXABAN 5 MG: 5 TABLET, FILM COATED ORAL at 20:57

## 2022-01-01 RX ADMIN — FLUTICASONE PROPIONATE 1 SPRAY: 50 SPRAY, METERED NASAL at 09:45

## 2022-01-01 RX ADMIN — SODIUM CHLORIDE, PRESERVATIVE FREE 10 ML: 5 INJECTION INTRAVENOUS at 09:19

## 2022-01-01 RX ADMIN — DULOXETINE HYDROCHLORIDE 30 MG: 30 CAPSULE, DELAYED RELEASE ORAL at 09:19

## 2022-01-01 RX ADMIN — DULOXETINE HYDROCHLORIDE 30 MG: 30 CAPSULE, DELAYED RELEASE ORAL at 09:15

## 2022-01-01 RX ADMIN — ASPIRIN 81 MG: 81 TABLET, COATED ORAL at 08:10

## 2022-01-01 RX ADMIN — PANTOPRAZOLE SODIUM 40 MG: 40 TABLET, DELAYED RELEASE ORAL at 06:34

## 2022-01-01 RX ADMIN — DULOXETINE HYDROCHLORIDE 30 MG: 30 CAPSULE, DELAYED RELEASE ORAL at 10:26

## 2022-01-01 RX ADMIN — POLYVINYL ALCOHOL 1 DROP: 14 SOLUTION/ DROPS OPHTHALMIC at 20:51

## 2022-01-01 RX ADMIN — FLUTICASONE PROPIONATE 1 SPRAY: 50 SPRAY, METERED NASAL at 13:23

## 2022-01-01 RX ADMIN — SODIUM CHLORIDE, PRESERVATIVE FREE 10 ML: 5 INJECTION INTRAVENOUS at 20:58

## 2022-01-01 RX ADMIN — METOPROLOL TARTRATE 50 MG: 50 TABLET, FILM COATED ORAL at 20:23

## 2022-01-01 RX ADMIN — DULOXETINE HYDROCHLORIDE 30 MG: 30 CAPSULE, DELAYED RELEASE ORAL at 08:42

## 2022-01-01 RX ADMIN — CETIRIZINE HYDROCHLORIDE TABLETS 10 MG: 10 TABLET, FILM COATED ORAL at 13:23

## 2022-01-01 RX ADMIN — MIRTAZAPINE 7.5 MG: 15 TABLET, FILM COATED ORAL at 21:42

## 2022-01-01 RX ADMIN — METOPROLOL TARTRATE 100 MG: 50 TABLET, FILM COATED ORAL at 20:57

## 2022-01-01 RX ADMIN — CETIRIZINE HYDROCHLORIDE TABLETS 10 MG: 10 TABLET, FILM COATED ORAL at 08:10

## 2022-01-01 RX ADMIN — CETIRIZINE HYDROCHLORIDE TABLETS 10 MG: 10 TABLET, FILM COATED ORAL at 09:44

## 2022-01-01 RX ADMIN — MIRTAZAPINE 7.5 MG: 15 TABLET, FILM COATED ORAL at 20:20

## 2022-01-01 RX ADMIN — Medication 3 MG: at 23:25

## 2022-01-01 RX ADMIN — ACETAMINOPHEN 650 MG: 325 TABLET ORAL at 23:17

## 2022-01-01 RX ADMIN — METOPROLOL TARTRATE 50 MG: 50 TABLET, FILM COATED ORAL at 09:42

## 2022-01-01 RX ADMIN — MIRTAZAPINE 7.5 MG: 15 TABLET, FILM COATED ORAL at 21:06

## 2022-01-01 RX ADMIN — APIXABAN 5 MG: 5 TABLET, FILM COATED ORAL at 15:50

## 2022-01-01 RX ADMIN — POLYVINYL ALCOHOL 1 DROP: 14 SOLUTION/ DROPS OPHTHALMIC at 20:24

## 2022-01-01 RX ADMIN — METOPROLOL TARTRATE 50 MG: 50 TABLET, FILM COATED ORAL at 08:10

## 2022-01-01 RX ADMIN — CETIRIZINE HYDROCHLORIDE TABLETS 10 MG: 10 TABLET, FILM COATED ORAL at 09:45

## 2022-01-01 RX ADMIN — POLYVINYL ALCOHOL 1 DROP: 14 SOLUTION/ DROPS OPHTHALMIC at 17:42

## 2022-01-01 RX ADMIN — MIRTAZAPINE 7.5 MG: 15 TABLET, FILM COATED ORAL at 22:36

## 2022-01-01 RX ADMIN — METFORMIN HYDROCHLORIDE 500 MG: 500 TABLET, FILM COATED ORAL at 17:38

## 2022-01-01 RX ADMIN — APIXABAN 5 MG: 5 TABLET, FILM COATED ORAL at 01:05

## 2022-01-01 RX ADMIN — AMLODIPINE BESYLATE 5 MG: 5 TABLET ORAL at 09:43

## 2022-01-01 RX ADMIN — SODIUM CHLORIDE, PRESERVATIVE FREE 10 ML: 5 INJECTION INTRAVENOUS at 09:45

## 2022-01-01 RX ADMIN — FLUTICASONE PROPIONATE 1 SPRAY: 50 SPRAY, METERED NASAL at 08:30

## 2022-01-01 RX ADMIN — METOPROLOL TARTRATE 50 MG: 50 TABLET, FILM COATED ORAL at 21:07

## 2022-01-01 RX ADMIN — SODIUM CHLORIDE: 9 INJECTION, SOLUTION INTRAVENOUS at 11:36

## 2022-01-01 RX ADMIN — INSULIN LISPRO 2 UNITS: 100 INJECTION, SOLUTION INTRAVENOUS; SUBCUTANEOUS at 17:37

## 2022-01-01 RX ADMIN — METOPROLOL TARTRATE 100 MG: 50 TABLET, FILM COATED ORAL at 22:44

## 2022-01-01 RX ADMIN — IPRATROPIUM BROMIDE AND ALBUTEROL SULFATE 1 AMPULE: .5; 2.5 SOLUTION RESPIRATORY (INHALATION) at 20:50

## 2022-01-01 RX ADMIN — POLYVINYL ALCOHOL 1 DROP: 14 SOLUTION/ DROPS OPHTHALMIC at 08:11

## 2022-01-01 RX ADMIN — SODIUM CHLORIDE, PRESERVATIVE FREE 10 ML: 5 INJECTION INTRAVENOUS at 08:11

## 2022-01-01 RX ADMIN — PANTOPRAZOLE SODIUM 40 MG: 40 TABLET, DELAYED RELEASE ORAL at 06:51

## 2022-01-01 RX ADMIN — METOPROLOL TARTRATE 50 MG: 50 TABLET, FILM COATED ORAL at 08:34

## 2022-01-01 RX ADMIN — METOPROLOL TARTRATE 100 MG: 50 TABLET, FILM COATED ORAL at 09:19

## 2022-01-01 RX ADMIN — METOPROLOL TARTRATE 50 MG: 50 TABLET, FILM COATED ORAL at 21:43

## 2022-01-01 RX ADMIN — METOPROLOL TARTRATE 100 MG: 50 TABLET, FILM COATED ORAL at 09:43

## 2022-01-01 RX ADMIN — PANTOPRAZOLE SODIUM 40 MG: 40 TABLET, DELAYED RELEASE ORAL at 09:19

## 2022-01-01 RX ADMIN — POLYVINYL ALCOHOL 1 DROP: 14 SOLUTION/ DROPS OPHTHALMIC at 15:55

## 2022-01-01 RX ADMIN — CEFEPIME HYDROCHLORIDE 2000 MG: 2 INJECTION, POWDER, FOR SOLUTION INTRAVENOUS at 13:11

## 2022-01-01 RX ADMIN — BUDESONIDE 500 MCG: 0.5 SUSPENSION RESPIRATORY (INHALATION) at 09:43

## 2022-01-01 RX ADMIN — APIXABAN 5 MG: 5 TABLET, FILM COATED ORAL at 08:11

## 2022-01-01 RX ADMIN — MIRTAZAPINE 7.5 MG: 15 TABLET, FILM COATED ORAL at 20:57

## 2022-01-01 RX ADMIN — METOPROLOL TARTRATE 50 MG: 50 TABLET, FILM COATED ORAL at 20:49

## 2022-01-01 RX ADMIN — PANTOPRAZOLE SODIUM 40 MG: 40 TABLET, DELAYED RELEASE ORAL at 06:26

## 2022-01-01 RX ADMIN — TAMSULOSIN HYDROCHLORIDE 0.4 MG: 0.4 CAPSULE ORAL at 12:24

## 2022-01-01 RX ADMIN — BUDESONIDE 500 MCG: 0.5 SUSPENSION RESPIRATORY (INHALATION) at 11:20

## 2022-01-01 RX ADMIN — IPRATROPIUM BROMIDE AND ALBUTEROL SULFATE 1 AMPULE: .5; 2.5 SOLUTION RESPIRATORY (INHALATION) at 13:56

## 2022-01-01 RX ADMIN — ASPIRIN 81 MG: 81 TABLET, COATED ORAL at 13:34

## 2022-01-01 RX ADMIN — SODIUM CHLORIDE, PRESERVATIVE FREE 10 ML: 5 INJECTION INTRAVENOUS at 09:44

## 2022-01-01 RX ADMIN — METOPROLOL TARTRATE 50 MG: 50 TABLET, FILM COATED ORAL at 08:47

## 2022-01-01 RX ADMIN — MIRTAZAPINE 7.5 MG: 15 TABLET, FILM COATED ORAL at 20:34

## 2022-01-01 RX ADMIN — PANTOPRAZOLE SODIUM 40 MG: 40 TABLET, DELAYED RELEASE ORAL at 08:47

## 2022-01-01 RX ADMIN — Medication 3 MG: at 21:07

## 2022-01-01 RX ADMIN — BUDESONIDE 500 MCG: 0.5 SUSPENSION RESPIRATORY (INHALATION) at 19:23

## 2022-01-01 RX ADMIN — Medication 3 MG: at 22:36

## 2022-01-01 RX ADMIN — POLYVINYL ALCOHOL 1 DROP: 14 SOLUTION/ DROPS OPHTHALMIC at 20:49

## 2022-01-01 RX ADMIN — TAMSULOSIN HYDROCHLORIDE 0.4 MG: 0.4 CAPSULE ORAL at 08:34

## 2022-01-01 RX ADMIN — POLYVINYL ALCOHOL 1 DROP: 14 SOLUTION/ DROPS OPHTHALMIC at 09:45

## 2022-01-01 RX ADMIN — CETIRIZINE HYDROCHLORIDE TABLETS 10 MG: 10 TABLET, FILM COATED ORAL at 08:43

## 2022-01-01 RX ADMIN — ATORVASTATIN CALCIUM 10 MG: 10 TABLET, FILM COATED ORAL at 09:43

## 2022-01-01 RX ADMIN — BUDESONIDE 500 MCG: 0.5 SUSPENSION RESPIRATORY (INHALATION) at 20:50

## 2022-01-01 RX ADMIN — ENOXAPARIN SODIUM 40 MG: 100 INJECTION SUBCUTANEOUS at 10:55

## 2022-01-01 RX ADMIN — PANTOPRAZOLE SODIUM 40 MG: 40 TABLET, DELAYED RELEASE ORAL at 05:39

## 2022-01-01 RX ADMIN — DULOXETINE HYDROCHLORIDE 30 MG: 30 CAPSULE, DELAYED RELEASE ORAL at 10:01

## 2022-01-01 RX ADMIN — DULOXETINE HYDROCHLORIDE 30 MG: 30 CAPSULE, DELAYED RELEASE ORAL at 08:33

## 2022-01-01 RX ADMIN — BUDESONIDE 500 MCG: 0.5 SUSPENSION RESPIRATORY (INHALATION) at 08:29

## 2022-01-01 RX ADMIN — TAMSULOSIN HYDROCHLORIDE 0.4 MG: 0.4 CAPSULE ORAL at 09:45

## 2022-01-01 RX ADMIN — MIRTAZAPINE 7.5 MG: 15 TABLET, FILM COATED ORAL at 20:26

## 2022-01-01 RX ADMIN — POLYVINYL ALCOHOL 1 DROP: 14 SOLUTION/ DROPS OPHTHALMIC at 13:10

## 2022-01-01 RX ADMIN — METFORMIN HYDROCHLORIDE 500 MG: 500 TABLET, FILM COATED ORAL at 08:34

## 2022-01-01 RX ADMIN — POLYVINYL ALCOHOL 1 DROP: 14 SOLUTION/ DROPS OPHTHALMIC at 20:20

## 2022-01-01 RX ADMIN — FLUTICASONE PROPIONATE 1 SPRAY: 50 SPRAY, METERED NASAL at 08:11

## 2022-01-01 RX ADMIN — SODIUM CHLORIDE, PRESERVATIVE FREE 10 ML: 5 INJECTION INTRAVENOUS at 08:43

## 2022-01-01 RX ADMIN — METOPROLOL TARTRATE 50 MG: 50 TABLET, FILM COATED ORAL at 21:44

## 2022-01-01 RX ADMIN — LORAZEPAM 1 MG: 2 INJECTION INTRAMUSCULAR; INTRAVENOUS at 01:08

## 2022-01-01 RX ADMIN — IPRATROPIUM BROMIDE AND ALBUTEROL SULFATE 1 AMPULE: .5; 2.5 SOLUTION RESPIRATORY (INHALATION) at 09:42

## 2022-01-01 RX ADMIN — POLYVINYL ALCOHOL 1 DROP: 14 SOLUTION/ DROPS OPHTHALMIC at 12:26

## 2022-01-01 RX ADMIN — CETIRIZINE HYDROCHLORIDE TABLETS 10 MG: 10 TABLET, FILM COATED ORAL at 10:01

## 2022-01-01 RX ADMIN — METOPROLOL TARTRATE 50 MG: 50 TABLET, FILM COATED ORAL at 13:24

## 2022-01-01 RX ADMIN — SODIUM CHLORIDE, PRESERVATIVE FREE 10 ML: 5 INJECTION INTRAVENOUS at 22:36

## 2022-01-01 RX ADMIN — FLUTICASONE PROPIONATE 1 SPRAY: 50 SPRAY, METERED NASAL at 10:27

## 2022-01-01 RX ADMIN — METOPROLOL TARTRATE 50 MG: 50 TABLET, FILM COATED ORAL at 08:42

## 2022-01-01 RX ADMIN — POLYVINYL ALCOHOL 1 DROP: 14 SOLUTION/ DROPS OPHTHALMIC at 21:45

## 2022-01-01 RX ADMIN — POLYVINYL ALCOHOL 1 DROP: 14 SOLUTION/ DROPS OPHTHALMIC at 10:27

## 2022-01-01 RX ADMIN — TAMSULOSIN HYDROCHLORIDE 0.4 MG: 0.4 CAPSULE ORAL at 09:19

## 2022-01-01 RX ADMIN — TAMSULOSIN HYDROCHLORIDE 0.4 MG: 0.4 CAPSULE ORAL at 09:43

## 2022-01-01 RX ADMIN — TAMSULOSIN HYDROCHLORIDE 0.4 MG: 0.4 CAPSULE ORAL at 08:10

## 2022-01-01 RX ADMIN — INSULIN LISPRO 2 UNITS: 100 INJECTION, SOLUTION INTRAVENOUS; SUBCUTANEOUS at 17:50

## 2022-01-01 RX ADMIN — CETIRIZINE HYDROCHLORIDE TABLETS 10 MG: 10 TABLET, FILM COATED ORAL at 09:15

## 2022-01-01 RX ADMIN — SODIUM CHLORIDE, PRESERVATIVE FREE 10 ML: 5 INJECTION INTRAVENOUS at 10:00

## 2022-01-01 RX ADMIN — BUDESONIDE 500 MCG: 0.5 SUSPENSION RESPIRATORY (INHALATION) at 08:01

## 2022-01-01 RX ADMIN — METOPROLOL TARTRATE 50 MG: 50 TABLET, FILM COATED ORAL at 21:42

## 2022-01-01 RX ADMIN — ASPIRIN 81 MG 81 MG: 81 TABLET ORAL at 22:44

## 2022-01-01 RX ADMIN — PANTOPRAZOLE SODIUM 40 MG: 40 TABLET, DELAYED RELEASE ORAL at 06:17

## 2022-01-01 RX ADMIN — ENOXAPARIN SODIUM 40 MG: 100 INJECTION SUBCUTANEOUS at 09:44

## 2022-01-01 RX ADMIN — CEFTRIAXONE 1000 MG: 1 INJECTION, POWDER, FOR SOLUTION INTRAMUSCULAR; INTRAVENOUS at 23:39

## 2022-01-01 RX ADMIN — FLUTICASONE PROPIONATE 1 SPRAY: 50 SPRAY, METERED NASAL at 09:15

## 2022-01-01 RX ADMIN — PANTOPRAZOLE SODIUM 40 MG: 40 TABLET, DELAYED RELEASE ORAL at 06:19

## 2022-01-01 RX ADMIN — PANTOPRAZOLE SODIUM 40 MG: 40 TABLET, DELAYED RELEASE ORAL at 05:03

## 2022-01-01 RX ADMIN — POLYVINYL ALCOHOL 1 DROP: 14 SOLUTION/ DROPS OPHTHALMIC at 21:43

## 2022-01-01 RX ADMIN — METOPROLOL TARTRATE 50 MG: 50 TABLET, FILM COATED ORAL at 09:44

## 2022-01-01 RX ADMIN — INSULIN LISPRO 1 UNITS: 100 INJECTION, SOLUTION INTRAVENOUS; SUBCUTANEOUS at 13:10

## 2022-01-01 RX ADMIN — POLYVINYL ALCOHOL 1 DROP: 14 SOLUTION/ DROPS OPHTHALMIC at 20:26

## 2022-01-01 RX ADMIN — POLYVINYL ALCOHOL 1 DROP: 14 SOLUTION/ DROPS OPHTHALMIC at 08:47

## 2022-01-01 RX ADMIN — POLYVINYL ALCOHOL 1 DROP: 14 SOLUTION/ DROPS OPHTHALMIC at 14:56

## 2022-01-01 RX ADMIN — LISINOPRIL 20 MG: 20 TABLET ORAL at 08:10

## 2022-01-01 RX ADMIN — SODIUM CHLORIDE, PRESERVATIVE FREE 10 ML: 5 INJECTION INTRAVENOUS at 21:44

## 2022-01-01 RX ADMIN — AMLODIPINE BESYLATE 5 MG: 5 TABLET ORAL at 08:10

## 2022-01-01 RX ADMIN — PANTOPRAZOLE SODIUM 40 MG: 40 TABLET, DELAYED RELEASE ORAL at 06:03

## 2022-01-01 RX ADMIN — METOPROLOL TARTRATE 50 MG: 50 TABLET, FILM COATED ORAL at 20:26

## 2022-01-01 RX ADMIN — METOPROLOL TARTRATE 50 MG: 50 TABLET, FILM COATED ORAL at 09:45

## 2022-01-01 RX ADMIN — CETIRIZINE HYDROCHLORIDE TABLETS 10 MG: 10 TABLET, FILM COATED ORAL at 08:33

## 2022-01-01 RX ADMIN — METOPROLOL TARTRATE 50 MG: 50 TABLET, FILM COATED ORAL at 20:47

## 2022-01-01 RX ADMIN — LISINOPRIL 20 MG: 20 TABLET ORAL at 10:01

## 2022-01-01 RX ADMIN — SENNOSIDES 8.6 MG: 8.6 TABLET, FILM COATED ORAL at 20:58

## 2022-01-01 RX ADMIN — TAMSULOSIN HYDROCHLORIDE 0.4 MG: 0.4 CAPSULE ORAL at 13:23

## 2022-01-01 RX ADMIN — METOPROLOL TARTRATE 50 MG: 50 TABLET, FILM COATED ORAL at 10:27

## 2022-01-01 RX ADMIN — TAMSULOSIN HYDROCHLORIDE 0.4 MG: 0.4 CAPSULE ORAL at 10:01

## 2022-01-01 RX ADMIN — POLYVINYL ALCOHOL 1 DROP: 14 SOLUTION/ DROPS OPHTHALMIC at 09:44

## 2022-01-01 RX ADMIN — INSULIN LISPRO 2 UNITS: 100 INJECTION, SOLUTION INTRAVENOUS; SUBCUTANEOUS at 11:39

## 2022-01-01 RX ADMIN — DULOXETINE HYDROCHLORIDE 30 MG: 30 CAPSULE, DELAYED RELEASE ORAL at 09:43

## 2022-01-01 RX ADMIN — APIXABAN 5 MG: 5 TABLET, FILM COATED ORAL at 10:01

## 2022-01-01 RX ADMIN — MIRTAZAPINE 7.5 MG: 15 TABLET, FILM COATED ORAL at 21:43

## 2022-01-01 RX ADMIN — POLYVINYL ALCOHOL 1 DROP: 14 SOLUTION/ DROPS OPHTHALMIC at 08:42

## 2022-01-01 RX ADMIN — CETIRIZINE HYDROCHLORIDE TABLETS 10 MG: 10 TABLET, FILM COATED ORAL at 10:28

## 2022-01-01 RX ADMIN — TAMSULOSIN HYDROCHLORIDE 0.4 MG: 0.4 CAPSULE ORAL at 10:26

## 2022-01-01 RX ADMIN — APIXABAN 5 MG: 5 TABLET, FILM COATED ORAL at 20:50

## 2022-01-01 RX ADMIN — FLUTICASONE PROPIONATE 1 SPRAY: 50 SPRAY, METERED NASAL at 09:44

## 2022-01-01 RX ADMIN — CETIRIZINE HYDROCHLORIDE TABLETS 10 MG: 10 TABLET, FILM COATED ORAL at 08:47

## 2022-01-01 RX ADMIN — METFORMIN HYDROCHLORIDE 500 MG: 500 TABLET, FILM COATED ORAL at 10:00

## 2022-01-01 RX ADMIN — PANTOPRAZOLE SODIUM 40 MG: 40 TABLET, DELAYED RELEASE ORAL at 06:21

## 2022-01-01 RX ADMIN — CETIRIZINE HYDROCHLORIDE 5 MG: 10 TABLET, FILM COATED ORAL at 09:43

## 2022-01-01 RX ADMIN — METOPROLOL TARTRATE 50 MG: 50 TABLET, FILM COATED ORAL at 20:34

## 2022-01-01 RX ADMIN — DEXTROSE MONOHYDRATE 25 G: 25 INJECTION, SOLUTION INTRAVENOUS at 14:34

## 2022-01-01 RX ADMIN — TAMSULOSIN HYDROCHLORIDE 0.4 MG: 0.4 CAPSULE ORAL at 08:42

## 2022-01-01 RX ADMIN — METOPROLOL TARTRATE 50 MG: 50 TABLET, FILM COATED ORAL at 09:15

## 2022-01-01 RX ADMIN — AMLODIPINE BESYLATE 5 MG: 5 TABLET ORAL at 10:01

## 2022-01-01 RX ADMIN — POLYVINYL ALCOHOL 1 DROP: 14 SOLUTION/ DROPS OPHTHALMIC at 16:26

## 2022-01-01 RX ADMIN — POLYVINYL ALCOHOL 1 DROP: 14 SOLUTION/ DROPS OPHTHALMIC at 15:32

## 2022-01-01 RX ADMIN — POLYVINYL ALCOHOL 1 DROP: 14 SOLUTION/ DROPS OPHTHALMIC at 08:30

## 2022-01-01 RX ADMIN — POLYVINYL ALCOHOL 1 DROP: 14 SOLUTION/ DROPS OPHTHALMIC at 20:34

## 2022-01-01 RX ADMIN — POLYVINYL ALCOHOL 1 DROP: 14 SOLUTION/ DROPS OPHTHALMIC at 09:15

## 2022-01-01 RX ADMIN — MIRTAZAPINE 7.5 MG: 15 TABLET, FILM COATED ORAL at 21:44

## 2022-01-01 RX ADMIN — DEXTROSE MONOHYDRATE 25 G: 25 INJECTION, SOLUTION INTRAVENOUS at 16:28

## 2022-01-01 RX ADMIN — PANTOPRAZOLE SODIUM 40 MG: 40 TABLET, DELAYED RELEASE ORAL at 05:15

## 2022-01-01 RX ADMIN — SODIUM CHLORIDE, PRESERVATIVE FREE 10 ML: 5 INJECTION INTRAVENOUS at 10:28

## 2022-01-01 RX ADMIN — POLYVINYL ALCOHOL 1 DROP: 14 SOLUTION/ DROPS OPHTHALMIC at 22:36

## 2022-01-01 RX ADMIN — SODIUM CHLORIDE, PRESERVATIVE FREE 10 ML: 5 INJECTION INTRAVENOUS at 21:45

## 2022-01-01 RX ADMIN — SODIUM CHLORIDE: 9 INJECTION, SOLUTION INTRAVENOUS at 09:17

## 2022-01-01 RX ADMIN — SODIUM CHLORIDE, PRESERVATIVE FREE 10 ML: 5 INJECTION INTRAVENOUS at 20:23

## 2022-01-01 RX ADMIN — SODIUM CHLORIDE, PRESERVATIVE FREE 10 ML: 5 INJECTION INTRAVENOUS at 09:48

## 2022-01-01 RX ADMIN — PANTOPRAZOLE SODIUM 40 MG: 40 TABLET, DELAYED RELEASE ORAL at 06:36

## 2022-01-01 RX ADMIN — SODIUM CHLORIDE, PRESERVATIVE FREE 10 ML: 5 INJECTION INTRAVENOUS at 08:07

## 2022-01-01 RX ADMIN — CEFTRIAXONE 1000 MG: 1 INJECTION, POWDER, FOR SOLUTION INTRAMUSCULAR; INTRAVENOUS at 23:05

## 2022-01-01 RX ADMIN — METOPROLOL TARTRATE 50 MG: 50 TABLET, FILM COATED ORAL at 10:01

## 2022-01-01 RX ADMIN — DULOXETINE HYDROCHLORIDE 30 MG: 30 CAPSULE, DELAYED RELEASE ORAL at 08:47

## 2022-01-01 RX ADMIN — SODIUM CHLORIDE, PRESERVATIVE FREE 10 ML: 5 INJECTION INTRAVENOUS at 20:19

## 2022-01-01 RX ADMIN — DULOXETINE HYDROCHLORIDE 30 MG: 30 CAPSULE, DELAYED RELEASE ORAL at 08:10

## 2022-01-01 RX ADMIN — DULOXETINE HYDROCHLORIDE 30 MG: 30 CAPSULE, DELAYED RELEASE ORAL at 13:27

## 2022-01-01 RX ADMIN — INSULIN LISPRO 1 UNITS: 100 INJECTION, SOLUTION INTRAVENOUS; SUBCUTANEOUS at 21:42

## 2022-01-01 ASSESSMENT — ENCOUNTER SYMPTOMS
VOMITING: 0
SORE THROAT: 0
NAUSEA: 0
BACK PAIN: 0
SORE THROAT: 0
COUGH: 0
SINUS PAIN: 0
NAUSEA: 0
SHORTNESS OF BREATH: 0
VOMITING: 0
EYE PAIN: 0
ABDOMINAL PAIN: 0
EYE PAIN: 0
ABDOMINAL PAIN: 0
SHORTNESS OF BREATH: 0
BACK PAIN: 0
COUGH: 0
RHINORRHEA: 0
SINUS PAIN: 0
DIARRHEA: 0
RHINORRHEA: 0
DIARRHEA: 0

## 2022-01-01 ASSESSMENT — PAIN SCALES - GENERAL
PAINLEVEL_OUTOF10: 0

## 2022-01-01 ASSESSMENT — PAIN - FUNCTIONAL ASSESSMENT: PAIN_FUNCTIONAL_ASSESSMENT: NONE - DENIES PAIN

## 2022-01-05 NOTE — ED PROVIDER NOTES
80-year-old male presents today to the emergency department from nursing home with complaints of fatigue. Patient recently tested positive for COVID-19 3 days ago and has not been eating or drinking as much as he normally does. Patient is denying any other symptoms on arrival.  His fatigue is moderate in severity, constant in nature and has been progressively getting worse. He denies any alleviating factors and reports that his symptoms are exacerbated by acute infection with COVID-19. Patient was on 2 L of oxygen at the nursing home after being diagnosed with Covid. He is denying any chest pain or shortness of breath. Denies any associated fevers, chills, nausea or vomiting. He has not had any abdominal pain. Review of Systems   Constitutional: Positive for appetite change and fatigue. Negative for chills, diaphoresis and fever. HENT: Negative for ear pain, rhinorrhea, sinus pain and sore throat. Eyes: Negative for pain. Respiratory: Negative for cough and shortness of breath. Cardiovascular: Negative for chest pain and leg swelling. Gastrointestinal: Negative for abdominal pain, diarrhea, nausea and vomiting. Genitourinary: Negative for dysuria, flank pain and frequency. Musculoskeletal: Negative for back pain, neck pain and neck stiffness. Neurological: Negative for dizziness, weakness, light-headedness and headaches. Psychiatric/Behavioral: Negative for agitation and confusion. Physical Exam  Vitals reviewed. Constitutional:       General: He is not in acute distress. Appearance: Normal appearance. He is not toxic-appearing. HENT:      Head: Normocephalic and atraumatic. Nose: No congestion or rhinorrhea. Eyes:      General: No scleral icterus. Right eye: No discharge. Left eye: No discharge. Extraocular Movements: Extraocular movements intact. Pupils: Pupils are equal, round, and reactive to light.    Cardiovascular: Rate and Rhythm: Normal rate and regular rhythm. Heart sounds: Normal heart sounds. No murmur heard. No friction rub. No gallop. Pulmonary:      Effort: Pulmonary effort is normal. No respiratory distress. Breath sounds: No wheezing, rhonchi or rales. Abdominal:      General: Abdomen is flat. There is no distension. Tenderness: There is no abdominal tenderness. Musculoskeletal:         General: No deformity or signs of injury. Cervical back: Normal range of motion and neck supple. No rigidity or tenderness. Right lower leg: No edema. Left lower leg: No edema. Skin:     General: Skin is warm and dry. Coloration: Skin is not jaundiced or pale. Neurological:      General: No focal deficit present. Mental Status: He is alert. Psychiatric:         Mood and Affect: Mood normal.          Procedures     MDM  Number of Diagnoses or Management Options  Hypoglycemia  Postviral fatigue syndrome  Diagnosis management comments: This is a 80-year-old male who presents today to the emergency department from nursing home with complaints of fatigue. Patient was recently diagnosed with Covid 3 days ago and has had decreased p.o. intake since then. Patient is reporting fatigue but denying any other symptoms. On arrival to the emergency department he is nontoxic, no acute distress. Patient is satting 100% on 2 L of oxygen which she is chronically on at the nursing home after diagnosis with Covid. On exam his lungs are clear to auscultation. No weakness noted on exam.  Normal neurologic exam with no abnormal findings. Lab work was obtained And showed a slightly decreased white count at 4.4. This is likely secondary to infection with COVID-19. Hemoglobin is 9.8. When compared to his most recent hemoglobin this is actually higher than previous. CMP is unremarkable for electrolyte abnormalities.   Patient's point-of-care glucose showed that he had hypoglycemia with a blood glucose level of 55. Patient was given an amp of D50 which improved his glucose to 173. Patient had a normal EKG with no ST elevations or ST depressions. He did have an elevated initial troponin at 66. Repeat troponin is 62. Discussed this with Dr. Ramos Sims, cardiologist who recommended giving the patient a baby aspirin as well as a beta-blocker. Patient was given these medications. He is stable for discharge today from the emergency department. Return precautions were given. Follow-up was discussed with both PCP and cardiologist.         ED Course as of 01/04/22 2215 Tue Jan 04, 2022 2205 Baby Asprin and BB    [NS]      ED Course User Index  [NS] Eve Hutchinson DO       ED Course as of 01/04/22 2216 Tue Jan 04, 2022 2205 Baby Asprin and BB    [NS]      ED Course User Index  [NS] Eve Hutchinson DO       --------------------------------------------- PAST HISTORY ---------------------------------------------  Past Medical History:  has a past medical history of Atherosclerotic heart disease, Atrial fibrillation ([address] CAD (coronary artery disease), COPD (chronic obstructive pulmonary disease) ([address] Diabetes mellitus ([facility] Hearing difficulty, Hyperlipidemia, Hypertension, Major depressive disorder, Pulmonary hypertension ([facility] Skin ulcer of back with fat layer exposed ([address] and Traumatic hematoma of lower back. Past Surgical History:  has a past surgical history that includes Coronary artery bypass graft (?); Abdominal aortic aneurysm repair (?); joint replacement; knee surgery; brain surgery (2008?); pacemaker placement (1996 2016); and pre-malignant / benign skin lesion excision (Left, 2/20/2020). Social History:  reports that he quit smoking about 47 years ago. His smoking use included cigarettes. He quit after 30.00 years of use. He has never used smokeless tobacco. He reports that he does not drink alcohol and does not use drugs. Family History: family history is not on file.      The patients home medications have been reviewed. Allergies: Patient has no known allergies.     -------------------------------------------------- RESULTS -------------------------------------------------  Labs:  Results for orders placed or performed during the hospital encounter of 01/04/22   Comprehensive Metabolic Panel w/ Reflex to MG   Result Value Ref Range    Sodium 140 132 - 146 mmol/L    Potassium reflex Magnesium 3.7 3.5 - 5.0 mmol/L    Chloride 101 98 - 107 mmol/L    CO2 27 22 - 29 mmol/L    Anion Gap 12 7 - 16 mmol/L    Glucose 73 (L) 74 - 99 mg/dL    BUN 16 6 - 23 mg/dL    CREATININE 1.2 0.7 - 1.2 mg/dL    GFR Non-African American 57 >=60 mL/min/1.73    GFR African American >60     Calcium 9.0 8.6 - 10.2 mg/dL    Total Protein 7.2 6.4 - 8.3 g/dL    Albumin 3.6 3.5 - 5.2 g/dL    Total Bilirubin 0.5 0.0 - 1.2 mg/dL    Alkaline Phosphatase 99 40 - 129 U/L    ALT 7 0 - 40 U/L    AST 20 0 - 39 U/L   CBC Auto Differential   Result Value Ref Range    WBC 4.4 (L) 4.5 - 11.5 E9/L    RBC 3.94 3.80 - 5.80 E12/L    Hemoglobin 9.8 (L) 12.5 - 16.5 g/dL    Hematocrit 31.6 (L) 37.0 - 54.0 %    MCV 80.2 80.0 - 99.9 fL    MCH 24.9 (L) 26.0 - 35.0 pg    MCHC 31.0 (L) 32.0 - 34.5 %    RDW 16.8 (H) 11.5 - 15.0 fL    Platelets 860 045 - 032 E9/L    MPV 8.8 7.0 - 12.0 fL    Neutrophils % 77.4 43.0 - 80.0 %    Lymphocytes % 7.0 (L) 20.0 - 42.0 %    Monocytes % 15.6 (H) 2.0 - 12.0 %    Eosinophils % 0.2 0.0 - 6.0 %    Basophils % 0.2 0.0 - 2.0 %    Neutrophils Absolute 3.39 1.80 - 7.30 E9/L    Lymphocytes Absolute 0.31 (L) 1.50 - 4.00 E9/L    Monocytes Absolute 0.70 0.10 - 0.95 E9/L    Eosinophils Absolute 0.00 (L) 0.05 - 0.50 E9/L    Basophils Absolute 0.00 0.00 - 0.20 E9/L    Anisocytosis 2+     Polychromasia 1+     Hypochromia 1+     Poikilocytes 2+     Jaya Cells 2+     Target Cells 1+    Troponin   Result Value Ref Range    Troponin, High Sensitivity 66 (H) 0 - 11 ng/L   Troponin   Result Value Ref Range    Troponin, High Sensitivity 62 (H) 0 - 11 ng/L   POCT Glucose   Result Value Ref Range    Meter Glucose 55 (L) 74 - 99 mg/dL   POCT Glucose   Result Value Ref Range    Meter Glucose 174 (H) 74 - 99 mg/dL   EKG 12 Lead   Result Value Ref Range    Ventricular Rate 91 BPM    Atrial Rate 312 BPM    QRS Duration 100 ms    Q-T Interval 336 ms    QTc Calculation (Bazett) 413 ms    R Axis 16 degrees    T Axis -37 degrees   EKG 12 Lead   Result Value Ref Range    Ventricular Rate 84 BPM    Atrial Rate 102 BPM    QRS Duration 100 ms    Q-T Interval 374 ms    QTc Calculation (Bazett) 441 ms    R Axis 36 degrees    T Axis -16 degrees       Radiology:  XR CHEST PORTABLE   Final Result   1. Worsening cardiomegaly. Bilateral perihilar and left lower lobe   opacities representing pulmonary edema, pneumonia, or some combination there   of.      2.  Chronic elevation of the right hemidiaphragm and accompanied by right   basilar atelectasis, infiltrate, or scarring.             ------------------------- NURSING NOTES AND VITALS REVIEWED ---------------------------  Date / Time Roomed:  1/4/2022  3:47 PM  ED Bed Assignment:  Charles Alex    The nursing notes within the ED encounter and vital signs as below have been reviewed. BP (!) 159/85   Pulse 90   Temp 97.8 °F (36.6 °C)   Resp 18   Ht 5' 10\" (1.778 m)   Wt 200 lb (90.7 kg)   SpO2 100%   BMI 28.70 kg/m²   Oxygen Saturation Interpretation: Normal      ------------------------------------------ PROGRESS NOTES ------------------------------------------  10:16 PM EST  I have spoken with the patient and discussed todays results, in addition to providing specific details for the plan of care and counseling regarding the diagnosis and prognosis. Their questions are answered at this time and they are agreeable with the plan. I discussed at length with them reasons for immediate return here for re evaluation.  They will followup with their primary care physician by calling their office tomorrow. --------------------------------- ADDITIONAL PROVIDER NOTES ---------------------------------  At this time the patient is without objective evidence of an acute process requiring hospitalization or inpatient management. They have remained hemodynamically stable throughout their entire ED visit and are stable for discharge with outpatient follow-up. The plan has been discussed in detail and they are aware of the specific conditions for emergent return, as well as the importance of follow-up. New Prescriptions    No medications on file       Diagnosis:  1. Hypoglycemia    2. Postviral fatigue syndrome        Disposition:  Patient's disposition: Discharge to home  Patient's condition is stable.        Dario Duron, DO  Resident  01/04/22 [address] DO Josy  Resident  01/05/22 [id number]

## 2022-01-05 NOTE — ED NOTES
Writer called [employer] to give discharge instructions. Per Milena Russell (aide) there are no nurses on staff after 8pm. Report given to Milena Russell, notified that ambulance  ETA is [phone].      Supriya Torrez RN  01/05/22 0462

## 2022-01-05 NOTE — ED NOTES
Discharge instructions and medications reviewed with patient. Patient verbalizes understanding. Questions and concerns addressed. Instructions signed and copy given. Patient left ED in no acute distress.         Roderic Gitelman, RN  01/05/22 0539

## 2022-04-19 PROBLEM — N39.0 UTI (URINARY TRACT INFECTION): Status: ACTIVE | Noted: 2022-01-01

## 2022-04-19 PROBLEM — N39.0 URINARY TRACT INFECTION: Status: ACTIVE | Noted: 2022-01-01

## 2022-04-19 NOTE — PROGRESS NOTES
Patient seen and examined at bedside  Patient sitting up in bed, awake and alert, able to converse appropriately  Patient was admitted for concerns of acute metabolic encephalopathy secondary to urinary tract infection.   Continue antibiotics, follow urine cultures  Rest per H&P

## 2022-04-19 NOTE — CARE COORDINATION
4/19 Care Coordination:Ptpresenting to the ED for altered mental status and weakness. Patient is presenting from [facility] assisted living facility. The nursing home felt he was generally weak and having difficulty walking. At baseline he is typically alert and oriented x2 and intermittently confused. They sent him to the emergency department for further evaluation. Of note patient is DNR CC. He typically ambulates with a walker. he is chronically on 3 L nasal cannula. Dx Acute Cystitis with Hematuria, Encephalopathy,Yeast infection. UTI. Started IV ATB. Plan is back to [facility]. Will reach out to pt's daughter. CM/SW will continue to follow for discharge planning.    Karen MOORE,RN-CV-BC  [phone]

## 2022-04-19 NOTE — H&P
Hospital Medicine History & Physical      PCP: Kasia Nelson DO    Date of Admission: 4/18/2022    Date of Service: Pt seen/examined on  4/19/2022 and Admitted to Inpatient with expected LOS greater than two midnights due to medical therapy. Placed in Observation. Chief Complaint:  AMS       History Of Present Illness:     80 y.o. male with past medical history of CAD HTN DM atrial fibrillation depression . Patient resides in an assisted lived facility . Daughter is present at bedside . She states that patient has become increasingly confused over the oast several days . Nursing home stated that  Patient has been very weak and has had difficulty walking . Patient uses a walker to ambulate. Patient at baseline is alert and oriented  x2 . On questioning patient is oriented to person  And place . Patient reports no chest pain shortness of breath nausea vomiting abdominal pain . He reports no falls syncope leg swelling headaches numbness tingling . In the ED .patient was hemodynamically stable . Lab data reveal sodium 134 potassium 4.0 BUN 21 creatinine 1.4    BNP 2067 Trop 39  EKG revealed Afib HR 93 WBC 16.4 HGB 9.8  CXR neg CT head neg COVId neg Flu a, b neg UA large blood large LE yeast . Patient received Rocephin in ED .  Patient will be admitted for further evaluation     Past Medical History:          Diagnosis Date    Atherosclerotic heart disease     Atrial fibrillation ([address]     CAD (coronary artery disease)     COPD (chronic obstructive pulmonary disease) ([address]     Diabetes mellitus ([address]     Hearing difficulty     Hyperlipidemia     Hypertension     Major depressive disorder     Pulmonary hypertension ([address]     Skin ulcer of back with fat layer exposed ([address] 5/8/2019    Traumatic hematoma of lower back 5/8/2019       Past Surgical History:          Procedure Laterality Date    ABDOMINAL AORTIC ANEURYSM REPAIR  ?    BRAIN SURGERY  2008?    hematoma    CORONARY ARTERY BYPASS GRAFT  ?    double? [facility] JOINT REPLACEMENT     [facility] KNEE SURGERY      PACEMAKER PLACEMENT  1996 2016    ADAPTA ADSR01 SERIAL #[id number] last check  battery life 11 months    PRE-MALIGNANT / BENIGN SKIN LESION EXCISION Left 2/20/2020    EXCISION OF LEFT POSTERIOR SCALP SQUAMOUS CELL CARCINOMA WITH FULL THICKINESS SKIN GRAFT LEFT CHEST, FROZEN performed by Daniel Rosenberg MD at [address] [place] [address]       Medications Prior to Admission:      Prior to Admission medications    Medication Sig Start Date End Date Taking? Authorizing Provider   Blood Glucose Calibration (ACCU-CHEK EDNA) SOLN AS DIRECTED 4/4/22   Mitesh Gustafson,    budesonide (PULMICORT) 0.5 MG/2ML nebulizer suspension USE 1 UNIT DOSE VIA NEBULIZER TWICE DAILY. 3/29/22   Mitesh Gustafson DO   DULoxetine (CYMBALTA) 30 MG extended release capsule TAKE 1 CAPSULE BY MOUTH ONCE A DAY. 2/11/22   Mitesh Gustafson DO   EASY TOUCH INSULIN SYRINGE 30G X 1/2\" 0.3 ML MISC USE TWICE DAILY 3/16/21   Drea Hill, DO   diclofenac (VOLTAREN) 50 MG EC tablet Take 1 tablet by mouth 2 times daily as needed for Pain 3/10/21   Mitesh Gustafson DO   senna (SENOKOT) 8.6 MG TABS tablet TAKE 2 TABLET BY MOUTH AT BEDTIME FOR CONSTIPATION 3/4/21   Mitesh Gustafson DO   BD SAFETYGLIDE INSULIN SYRINGE 29G X 1/2\" 0.3 ML MISC USE TWICE DAILY 1/26/21   Mitesh Gustafson,    insulin 70-30 (NOVOLIN 70/30) (70-30) 100 UNIT per ML injection vial Take 16 units with breakfast and 10 units with dinner.   Patient taking differently: 16 Units Take 16 units with breakfast 8/4/20   Drea Hill, DO   mirtazapine (REMERON) 7.5 MG tablet Take 1 tablet by mouth nightly 7/24/20   Mitesh Black DO   fluticasone (FLONASE) 50 MCG/ACT nasal spray 1 spray by Each Nostril route daily 4/28/20   Mitesh Gustafson DO   ipratropium-albuterol (DUONEB) 0.5-2.5 (3) MG/3ML SOLN nebulizer solution USE 1 VIAL IN NEBULIZER 3 TIMES DAILY 3/18/20   Mitesh Gustafson, DO   bacitracin 500 UNIT/GM ointment Apply topically 2 times daily. 2/20/20   RACHEL Roth   meclizine (ANTIVERT) 12.5 MG tablet Take 25 mg by mouth 4 times daily as needed     Historical Provider, MD   Dextromethorphan-guaiFENesin (TUSSIN DM PO) Take 10 mLs by mouth as needed  1/30/20   Historical Provider, MD   amLODIPine (NORVASC) 2.5 MG tablet Take 1 tablet by mouth daily  Patient taking differently: Take 5 mg by mouth daily  2/11/20   Mitesh Gustafson DO   loratadine (CLARITIN) 10 MG tablet Take 1 tablet by mouth daily 11/1/19   Zen Patel DO   tamsulosin (FLOMAX) 0.4 MG capsule Take 1 capsule by mouth daily 6/10/19   Mitesh Gustafson DO   acetaminophen (TYLENOL) 325 MG tablet Take 650 mg by mouth every 4 hours as needed for Pain    Historical Provider, MD   Menthol, Topical Analgesic, (BIOFREEZE ROLL-ON) 4 % GEL Apply topically    Historical Provider, MD   Magnesium Hydroxide (MILK OF MAGNESIA PO) Take by mouth 2 tablespoon every 3 days as needed    Historical Provider, MD   pantoprazole sodium (PROTONIX) 40 MG PACK packet Take 40 mg by mouth every morning (before breakfast)    Historical Provider, MD   metoprolol (LOPRESSOR) 100 MG tablet Take 100 mg by mouth 2 times daily    Historical Provider, MD   melatonin 3 MG TABS tablet Take 3 mg by mouth nightly as needed    Historical Provider, MD   atorvastatin (LIPITOR) 10 MG tablet Take 10 mg by mouth daily    Historical Provider, MD   metFORMIN (GLUCOPHAGE) 500 MG tablet Take 500 mg by mouth 2 times daily     Historical Provider, MD   Furosemide (LASIX PO) Take 20 mg by mouth daily     Historical Provider, MD   lisinopril (PRINIVIL;ZESTRIL) 20 MG tablet Take 20 mg by mouth     Historical Provider, MD       Allergies:  Patient has no known allergies. Social History:      The patient currently lives with in assisted living      TOBACCO:   reports that he quit smoking about 47 years ago. His smoking use included cigarettes. He quit after 30.00 years of use.  He has never used smokeless tobacco.  ETOH:   reports no history of alcohol use. Family History:       Reviewed in detail and negative for DM, CAD, Cancer, CVA. Positive as follows:    History reviewed. No pertinent family history. REVIEW OF SYSTEMS:   Pertinent positives as noted in the HPI. All other systems reviewed and negative. PHYSICAL EXAM:    BP (!) 142/72   Pulse 62   Temp 101.7 °F (38.7 °C) (Oral)   Resp 22   Ht 5' 10\" (1.778 m)   Wt 200 lb (90.7 kg)   SpO2 96%   BMI 28.70 kg/m²     General appearance:  No apparent distress, appears stated age and cooperative. HEENT:  Normal cephalic, atraumatic without obvious deformity. Pupils equal, round, and reactive to light. Extra ocular muscles intact. Conjunctivae/corneas clear. Neck: Supple, with full range of motion. No jugular venous distention. Trachea midline. Respiratory:  Normal respiratory effort. Clear to auscultation, bilaterally without Rales/Wheezes/Rhonchi. Cardiovascular:  Regular rate and rhythm with normal S1/S2 without murmurs, rubs or gallops. Abdomen: Soft, non-tender, non-distended with normal bowel sounds. Musculoskeletal:  No clubbing, cyanosis or edema bilaterally. Full range of motion without deformity. Skin: Skin color, texture, turgor normal.  No rashes or lesions. Neurologic:  Neurovascularly intact without any focal sensory/motor deficits. Cranial nerves: II-XII intact, grossly non-focal.  Psychiatric:  Alert and oriented x2 , thought content appropriate, normal insight    CXR:  I have reviewed the CXR   EKG:  I have reviewed the EKG     Labs:     Recent Labs     04/18/22 2145   WBC 16.4*   HGB 9.8*   HCT 31.2*        Recent Labs     04/18/22 2145      K 4.0   CL 98   CO2 27   BUN 21   CREATININE 1.4*   CALCIUM 8.5*     Recent Labs     04/18/22 2145   AST 12   ALT 5   BILITOT 0.4   ALKPHOS 94     No results for input(s): INR in the last 72 hours.   No results for input(s): Jeane Beat in the last 72 hours.    Urinalysis:      Lab Results   Component Value Date    NITRU Negative 04/18/2022    WBCUA 10-20 04/18/2022    BACTERIA FEW 04/18/2022    RBCUA 2-5 04/18/2022    BLOODU LARGE 04/18/2022    SPECGRAV 1.020 04/18/2022    GLUCOSEU Negative 04/18/2022         ASSESSMENT:    Active Hospital Problems    Diagnosis Date Noted    UTI (urinary tract infection) [N39.0] 04/19/2022    Urinary tract infection [N39.0] 04/19/2022       PLAN:    Acute encephalopathy  likely sec to UTI. CXR neg COVID neg Flu neg  CT head neg lactic acid  0.8 WBC 16.4 Admit observation vitals neuro checks IV antibiotics blood and urine cultures . Patient appears to be back to baseline     Urinary tract infection : await urine culture UA revealed yeast and patient received Diflucan in ED  C/w IV Rocephin    Acute kidney injury on CKD stage 3 : gentle hydration with IVF held Lasix and Lisinopril     Elevated Trop : 39 patient reports no chest pain likely demand ischemia .  EKG revealed atrial fibrillation     Chronic hypoxic respiratory failure : on 3L NC daily    COPD :C/w bronchodilators     Elevated BNP: ? sec to COPD     Atrial Fibrillation : C/w Lopressor no on anticoagulation     Microcystic Anemia : HGB  9.8 iron studies     Hypertension : C.w Lopressor Norvasc     Hyperlipidemia :C/w Lipitor     Type 2 Diabetes : sliding scale and fingerstick                                   DVT Prophylaxis: Lovenox   Diet: ADULT DIET; Easy to Chew; 3 carb choices (45 gm/meal)  Code Status: DNR-CC         Sandra Jane MD

## 2022-04-19 NOTE — ED NOTES
Hard of hearing. Confused to time, otherwise oriented. Follows commands. Denies fall. 89% spo2 on room air. Placed n 2L nc. Daughter at bedside.      Claudine Mcclain RN  04/18/22 2137

## 2022-04-19 NOTE — ED PROVIDER NOTES
Department of Emergency Medicine   ED  Provider Note  Admit Date/RoomTime: 4/18/2022  8:50 PM  [facility]          History of Present Illness:  4/18/22, Time: 10:15 PM EDT  Chief Complaint   Patient presents with    Altered Mental Status     pt from assisted living and has increased confusion w/ weakness. pt baseline is alert and intermittent confusion and ambulates on his own. today started to have issues moving around and getting more confused.  Extremity Weakness       Tammi Schultz is a 80 y.o. male presenting to the ED for altered mental status and weakness. Patient is presenting from [facility] living facility. The nursing home felt he was generally weak and having difficulty walking. They noted that he was somewhat altered. At baseline he is typically alert and oriented x2 and intermittently confused. They sent him to the emergency department for further evaluation. Of note patient is DNR CC. Patient has no complaints at this time. He denies any chest pain, shortness of breath, abdominal pain, nausea, vomiting, fevers, cough, dysuria, hematuria or diarrhea. Denies headache, numbness, tingling, focal weakness, or visual changes. His daughter is at the bedside and feels that he is at baseline. He typically ambulates with a walker. No recent trauma. Of note, the patient's daughter states that he is chronically on 3 L nasal cannula. Patient is vaccinated for COVID. Review of Systems:   Constitutional symptoms: Denies fever  Eyes: Denies eye pain  Ears, Nose, Mouth, Throat: Denies ear pain  Cardiovascular: Denies chest pain  Respiratory: Denies shortness of breath  Gastrointestinal: Denies blood per rectum  Genitourinary: Denies hematuria  Skin: Denies rashes  Neurological: Denies headache.   Positive for weakness  Musculoskeletal: Denies extremity pain    --------------------------------------------- PAST HISTORY ---------------------------------------------  Past Medical History:  has a past medical history of Atherosclerotic heart disease, Atrial fibrillation ([address] CAD (coronary artery disease), COPD (chronic obstructive pulmonary disease) ([address] Diabetes mellitus ([address] Hearing difficulty, Hyperlipidemia, Hypertension, Major depressive disorder, Pulmonary hypertension ([address] Skin ulcer of back with fat layer exposed ([address] and Traumatic hematoma of lower back. Past Surgical History:  has a past surgical history that includes Coronary artery bypass graft (?); Abdominal aortic aneurysm repair (?); joint replacement; knee surgery; brain surgery (2008?); pacemaker placement (1996 2016); and pre-malignant / benign skin lesion excision (Left, 2/20/2020). Social History:  reports that he quit smoking about 47 years ago. His smoking use included cigarettes. He quit after 30.00 years of use. He has never used smokeless tobacco. He reports that he does not drink alcohol and does not use drugs. Family History: family history is not on file. . Unless otherwise noted, family history is non contributory    The patients home medications have been reviewed. Allergies: Patient has no known allergies. I have reviewed the past medical history, past surgical history, social history, and family history    ---------------------------------------------------PHYSICAL EXAM--------------------------------------    General: The patient is conversational and lying comfortably in bed. Head: Normocephalic and atraumatic. Eyes: Sclera non-icteric and no conjunctival injection  ENT: Nasal and oral membranes appear dry  Neck: Trachea midline. No JVD  Respiratory: Lungs clear to auscultation bilaterally. Patient speaking in full sentences. Cardiovascular: Irregularly irregular. No pedal edema. Gastrointestinal:  Abdomen is soft and nondistended. Bowel sounds present. There is no tenderness. There is no guarding or rebound. Musculoskeletal: No deformity  Skin: Skin warm and dry.   No rashes. Neurologic: No gross motor deficits. No aphasia. Pupils are equal and reactive to light. Extraocular eye movements intact. Sensation intact bilaterally. Symmetric facies. Tongue protrudes midline. 5 out of 5 symmetric strength of the upper and lower extremities. Mild tremor on finger-nose testing. Alert and oriented to person and place but not the year. He knows that Blanca Gambino is not the president but cannot recall the president's name. No meningismus  Psychiatric: Normal affect.    -------------------------------------------------- RESULTS -------------------------------------------------  I have personally reviewed all laboratory and imaging results for this patient. Results are listed below.      LABS: (Lab results interpreted by me)  Results for orders placed or performed during the hospital encounter of 04/18/22   COVID-19 & Influenza Combo    Specimen: Nasopharyngeal Swab   Result Value Ref Range    SARS-CoV-2 RNA, RT PCR NOT DETECTED NOT DETECTED    INFLUENZA A NOT DETECTED NOT DETECTED    INFLUENZA B NOT DETECTED NOT DETECTED   CBC with Auto Differential   Result Value Ref Range    WBC 16.4 (H) 4.5 - 11.5 E9/L    RBC 4.16 3.80 - 5.80 E12/L    Hemoglobin 9.8 (L) 12.5 - 16.5 g/dL    Hematocrit 31.2 (L) 37.0 - 54.0 %    MCV 75.0 (L) 80.0 - 99.9 fL    MCH 23.6 (L) 26.0 - 35.0 pg    MCHC 31.4 (L) 32.0 - 34.5 %    RDW 18.1 (H) 11.5 - 15.0 fL    Platelets 524 395 - 922 E9/L    MPV 9.2 7.0 - 12.0 fL    Neutrophils % 83.4 (H) 43.0 - 80.0 %    Immature Granulocytes % 0.5 0.0 - 5.0 %    Lymphocytes % 7.2 (L) 20.0 - 42.0 %    Monocytes % 7.0 2.0 - 12.0 %    Eosinophils % 1.5 0.0 - 6.0 %    Basophils % 0.4 0.0 - 2.0 %    Neutrophils Absolute 13.66 (H) 1.80 - 7.30 E9/L    Immature Granulocytes # 0.08 E9/L    Lymphocytes Absolute 1.17 (L) 1.50 - 4.00 E9/L    Monocytes Absolute 1.14 (H) 0.10 - 0.95 E9/L    Eosinophils Absolute 0.24 0.05 - 0.50 E9/L    Basophils Absolute 0.07 0.00 - 0.20 E9/L Comprehensive Metabolic Panel w/ Reflex to MG   Result Value Ref Range    Sodium 134 132 - 146 mmol/L    Potassium reflex Magnesium 4.0 3.5 - 5.0 mmol/L    Chloride 98 98 - 107 mmol/L    CO2 27 22 - 29 mmol/L    Anion Gap 9 7 - 16 mmol/L    Glucose 97 74 - 99 mg/dL    BUN 21 6 - 23 mg/dL    CREATININE 1.4 (H) 0.7 - 1.2 mg/dL    GFR Non-African American 48 >=60 mL/min/1.73    GFR African American 58     Calcium 8.5 (L) 8.6 - 10.2 mg/dL    Total Protein 6.9 6.4 - 8.3 g/dL    Albumin 3.3 (L) 3.5 - 5.2 g/dL    Total Bilirubin 0.4 0.0 - 1.2 mg/dL    Alkaline Phosphatase 94 40 - 129 U/L    ALT 5 0 - 40 U/L    AST 12 0 - 39 U/L   Troponin   Result Value Ref Range    Troponin, High Sensitivity 39 (H) 0 - 11 ng/L   Brain Natriuretic Peptide   Result Value Ref Range    Pro-BNP 2,067 (H) 0 - 450 pg/mL   Urinalysis with Microscopic   Result Value Ref Range    Color, UA Yellow Straw/Yellow    Clarity, UA CLOUDY (A) Clear    Glucose, Ur Negative Negative mg/dL    Bilirubin Urine Negative Negative    Ketones, Urine Negative Negative mg/dL    Specific Gravity, UA 1.020 1.005 - 1.030    Blood, Urine LARGE (A) Negative    pH, UA 6.0 5.0 - 9.0    Protein, UA TRACE Negative mg/dL    Urobilinogen, Urine 0.2 <2.0 E.U./dL    Nitrite, Urine Negative Negative    Leukocyte Esterase, Urine LARGE (A) Negative    WBC, UA 10-20 (A) 0 - 5 /HPF    RBC, UA 2-5 0 - 2 /HPF    Bacteria, UA FEW (A) None Seen /HPF    Yeast, UA Present (A) None Seen /HPF   Lactate, Sepsis   Result Value Ref Range    Lactic Acid, Sepsis 0.8 0.5 - 1.9 mmol/L   POCT Glucose   Result Value Ref Range    Meter Glucose 96 74 - 99 mg/dL   ,     RADIOLOGY:  Interpreted by Radiologist unless otherwise specified  CT Head WO Contrast   Final Result   No acute intracranial abnormality. XR CHEST PORTABLE   Final Result   No acute process.              ------------------------- NURSING NOTES AND VITALS REVIEWED ---------------------------   The nursing notes within the ED encounter and vital signs as below have been reviewed by myself  BP (!) 148/74   Pulse 102   Temp 101.7 °F (38.7 °C) (Oral)   Resp 18   Ht 5' 10\" (1.778 m)   Wt 200 lb (90.7 kg)   SpO2 (!) 89%   BMI 28.70 kg/m²     Oxygen Saturation Interpretation: Abnormal - but at baseline    The patients available past medical records and past encounters were reviewed. ------------------------------ ED COURSE/MEDICAL DECISION MAKING----------------------  Medications   cefTRIAXone (ROCEPHIN) 2,000 mg in sterile water 20 mL IV syringe (has no administration in time range)   fluconazole (DIFLUCAN) tablet 200 mg (has no administration in time range)   0.9 % sodium chloride bolus (0 mLs IntraVENous Stopped 4/19/22 0114)   acetaminophen (TYLENOL) tablet 650 mg (650 mg Oral Given 4/18/22 5727)       Medical Decision Making: This is a 80 y.o. male presenting to the ED for weakness. On initial presentation, the patient's vital signs are interpreted as hypertensive, tachycardic, febrile and hypoxic on room air. Based on history and physical exam, we have a broad differential.  As the patient is found to be febrile, we are concerned for infectious process such as pneumonia, UTI, or COVID. With his altered mental status, we considered intracranial pathology. Patient has no history of trauma and has a nonfocal neurological exam.  Daughter feels he is at baseline. The patient was hypoxic on room air however his daughter notes he is on 3 L chronically. This is also at baseline. He will be kept on nasal cannula. Patient given Tylenol for his temperature and hydrated with a liter bolus. Chest x-ray, EKG, laboratory studies and CT of the head obtained. A 12-lead EKG was performed and interpreted by myself. The rate is 93 with irregularly irregular and normal axis the QRS interval is 92, and QTC interval is 427. Biphasic T wave in V 5 and V6. V6 is old. Biphasic T waves also when aVF.   RSR prime in lead III with T wave the plan of care and counseling regarding the diagnosis and prognosis. Questions are answered at this time and they are agreeable with the plan.     --------------------------------- IMPRESSION AND DISPOSITION ---------------------------------    IMPRESSION  1. Acute cystitis with hematuria    2. Encephalopathy    3. Yeast infection        DISPOSITION  Disposition: Admit to telemetry  Patient condition is fair    IDr. Dami, am the primary provider of record    NOTE: This report was transcribed using voice recognition software.  Every effort was made to ensure accuracy; however, inadvertent computerized transcription errors may be present       Dami Mckeon MD  04/19/22 3351

## 2022-04-20 NOTE — CARE COORDINATION
4/20 Care Coordination: Plan for discharge today back to [facility], [address]. Pt's son Artist Carmine will transport back. Will be here after 3pm. PT/OT need to see. Called [facility], they will set up OP PT/OT. Will have order for with discharge instructions. CM/SW will continue to follow for discharge planning.    Heber MOORE,RN-CV-BC  [phone]

## 2022-04-20 NOTE — PROGRESS NOTES
Physical Therapy  Physical Therapy Initial Assessment     Name: Irish Avina  : 1931  MRN: [id number]      Date of Service: 2022    Evaluating PT:  Wes [place] PT, REMIGIO [id number]     Room #:  1217/2260-Q  Diagnosis:  UTI (urinary tract infection) [N39.0]  Urinary tract infection [N39.0]  Yeast infection [B37.9]  Encephalopathy [G93.40]  Acute cystitis with hematuria [N30.01]  Reason for admission: AMS, weakness   Precautions:  Falls, O2  Procedure/Surgery:  None   Equipment Recommendations:  FWW    SUBJECTIVE:  Pt lives at an [facility]. Pt ambulated with rollator PTA. OBJECTIVE:   Initial Evaluation  Date:  Treatment   Short Term/ Long Term   Goals   AM-PAC 6 Clicks 75/05     Was pt agreeable to Eval/treatment? Yes      Does pt have pain?  Denies      Bed Mobility  Rolling: SBA  Supine to sit: SBA  Sit to supine: SBA  Scooting: SBA  Independent    Transfers Sit to stand: SBA  Stand to sit: SBA  Stand pivot: NT  Independent    Ambulation    60 feet with [employer] Luis    >150 feet with [employer] Mod I    Stair negotiation: ascended and descended  NT  NA     LE ROM: WFL    LE Strength:   knee ext: 5/5  ankle DF: 5/5    Balance:   Sitting static: Supervision  Sitting dynamic: SBA  Standing static: SBA [employer]  Standing dynamic: Luis [employer]    -Pt is A & O x 3  -Sensation:  Pt denies numbness and tingling to extremities  -Edema:  Unremarkable    -Vitals  On room air:  Post ambulation SpO2 96%  Resting SpO2 >96%    Therapeutic Exercises:  Functional activity     Patient education  Pt educated on safety, sequencing of transfers, and role of PT    Patient response to education:   Pt verbalized understanding Pt demonstrated skill Pt requires further education in this area   Yes  Yes  Yes      ASSESSMENT:  Conditions Requiring Skilled Therapeutic Intervention:  []Decreased strength     []Decreased ROM  [x]Decreased functional mobility  [x]Decreased balance   [x]Decreased endurance   []Decreased posture  []Decreased sensation  []Decreased coordination   []Decreased vision  [x]Decreased safety awareness   []Increased pain       Comments:  Pt received supine in bed and agreeable to PT session   Pt was able to complete bed level transfers unassisted. His sitting balance is fair. Stood from bedside without lift assistance. His ambulation speed is fair and balance shows mild instability. Cues for direction of travel, turning, and proper walker usage. Returned to bed to end session   Pt with all needs met and call light in reach. Pt would benefit from continued PT POC to address functional deficits described above. Treatment:  Patient practiced and was instructed in the following treatment:     Therapeutic activity  o Patient education provided continuously throughout session for sequencing, safety maintenance, and improving any deficits found during the evaluation. Pt's/ family goals   1. Return to [facility]    Patient and or family understand(s) diagnosis, prognosis, and plan of care. yes    Prognosis is good for reaching above PT goals. PHYSICAL THERAPY PLAN OF CARE:    PT POC is established based on physician order and patient diagnosis     Referring provider/PT Order:  04/19/22 0615   PT evaluation and treat Start: 04/19/22 0615, End: 04/19/22 0615, ONE TIME, Standing Count: 1 Occurrences, Angelica Schulte MD    Diagnosis:  UTI (urinary tract infection) [N39.0]  Urinary tract infection [N39.0]  Yeast infection [B37.9]  Encephalopathy [G93.40]  Acute cystitis with hematuria [N30.01]  Specific instructions for next treatment:  Progress transfers and ambulation as able.      Current Treatment Recommendations:   [] Strengthening to improve independence with functional mobility   [] ROM to improve independence with functional mobility   [x] Balance Training to improve static/dynamic balance and to reduce fall risk  [x] Endurance Training to improve activity tolerance during functional mobility   [x] Transfer Training to improve safety and independence with all functional transfers   [x] Gait Training to improve gait mechanics, endurance and asses need for appropriate assistive device  [] Stair Training in preparation for safe discharge home and/or into the community   [x] Positioning to prevent skin breakdown and contractures  [x] Safety and Education Training   [] Patient/Caregiver Education   [] HEP  [] Other     PT long term treatment goals are located in above grid    Frequency of treatments: 2-5x/week x 1-2 weeks. Time in  1400  Time out  1420    Total Treatment Time  - minutes     Evaluation Time includes thorough review of current medical information, gathering information on past medical history/social history and prior level of function, completion of standardized testing/informal observation of tasks, assessment of data and education on plan of care and goals.     CPT codes:  [x] Low Complexity PT evaluation 78698  [] Moderate Complexity PT evaluation 03684  [] High Complexity PT evaluation 19998  [] PT Re-evaluation 84655  [] Gait training 32574 - minutes  [] Manual therapy 92450 - minutes  [] Therapeutic activities 18010 - minutes  [] Therapeutic exercises 22011 - minutes  [] Neuromuscular reeducation 30090 - minutes     Santino Byrd, PT, DPT  [id number]

## 2022-04-20 NOTE — PROGRESS NOTES
OCCUPATIONAL THERAPY INITIAL EVALUATION      [facility] [employer] [address] [address]       Date:2022                                                  Patient Name: Simón Escalera  MRN: [id number]  : 1931  Room: [address] [place] [address]    Evaluating OT: [place], [place] #[id number]    Referring Provider[de-identified] Florence Chand MD     Specific Provider Orders/Date: OT evaluation and treatment 22    Diagnosis:  UTI (urinary tract infection) [N39.0]  Urinary tract infection [N39.0]  Yeast infection [B37.9]  Encephalopathy [G93.40]  Acute cystitis with hematuria [N30.01]      Pertinent Medical History:  has a past medical history of Atherosclerotic heart disease, Atrial fibrillation ([address] CAD (coronary artery disease), COPD (chronic obstructive pulmonary disease) ([address] Diabetes mellitus ([address] Hearing difficulty, Hyperlipidemia, Hypertension, Major depressive disorder, Pulmonary hypertension ([address] Skin ulcer of back with fat layer exposed ([address] and Traumatic hematoma of lower back.        Precautions:  Fall Risk,     Assessment of current deficits   [x] Functional mobility   [x]ADLs  [x] Strength               []Cognition   [x] Functional transfers   [] IADLs         [x] Safety Awareness   [x]Endurance   [] Fine Coordination              [x] Balance      [] Vision/perception   []Sensation    []Gross Motor Coordination  [] ROM  [] Delirium                   [] Motor Control     OT PLAN OF CARE   OT POC based on physician orders, patient diagnosis and results of clinical assessment    Frequency/Duration  2-5 days/wk for 2 weeks PRN   Specific OT Treatment to include:   * Instruction/training on adapted ADL techniques and AE recommendations to increase functional independence within precautions       * Functional transfer/mobility training/DME recommendations for increased independence, safety, and fall prevention  * Patient/Family education to increase follow through with safety techniques and functional independence  * Therapeutic exercise to improve motor endurance, ROM, and functional strength for ADLs/functional transfers  * Therapeutic activities to facilitate/challenge dynamic balance, stand tolerance for increased safety and independence with ADLs  * Positioning to improve skin integrity, interaction with environment and functional independence      Recommended Adaptive Equipment:  TBD     Home Living:  Pt lives in an assisted   Bathroom setup: walk in shower with built in shower seat, [address]   Equipment owned: rollator,    Prior Level of Function: Modified [place]  with ADLs , assist with IADLs; ambulated with rollator  Driving: no  Occupation/leisure: not reported    Pain Level: none    Cognition: A&O: 4/4;   Follows multi step directions: good    Memory:  good    Sequencing:  fair    Problem solving:  fair    Judgement/safety:  fair     Functional Assessment:   AM-PAC Daily Activity Raw Score: 18/24     Initial Eval Status  Date: 4/20/22 Treatment Status  Date: STG=LTG  Time frame: 10-14 days   Feeding Independent   Independent    Grooming Stand by Assist   Independent    UB Dressing Stand by Assist   Independent    LB Dressing Set up/SBA   For balance /safety/  Modified [place]    Bathing Moderate Assist  For safety  Modified [place]    Toileting Minimal Assist   Modified [place]    Bed Mobility  Supine to sit: Stand by Assist   Sit to supine: Stand by Assist   Supine to sit: Modified [place]   Sit to supine: Modified [place]    Functional Transfers Sit to stand: Stand by Assist   Stand to sit: Stand by Assist   Stand pivot: NT   Modified [place]    Functional Mobility CGA with ww  Cues for safety with ww, especially with turns  Mod indep with AAD   Balance Sitting: supervision     Standing: CGA  Sitting: indep      Standing: mod indep   Activity Tolerance fair  good   Visual/  Perceptual Glasses: reading BUE  ROM/Strength/  Fine motor Coordination Hand dominance: R    RUE: ROM WFL     Strength: grossly 4/5      Strength:  WFL     Coordination:   WFL    LUE: ROM  WFL     Strength: grossly 4/5      Strength:  WFL     Coordination: WFL       Hearing: WFL   Sensation:  No c/o numbness or tingling   Tone:  WFL   Edema:  None noted    Comments:   RN cleared patient for OT. Upon arrival, patient in bed. Family present and supportive of patient and follow up with therapy. .  Therapist facilitated and instructed pt on adapted  techniques & compensatory strategies to improve safety and independence with basic ADLs, bed mobility,  functional transfers & functional mobility  to allow pt to achieve highest level of independence and safely. Patient presents with  slight decreased  ADLs,   functional transfers, and  functional mobility . At end of session, patient in bed with call light and phone within reach, all lines and tubes intact. Pt would benefit from continued skilled OT to increase safety and independence with completion of ADL tasks and functional mobility for improved quality of life. Treatment: OT treatment provided this date includes:    ADL-  Instruction/training on safety and adapted techniques for completion of basic ADLS    Mobility-  Instruction/training on safety and improved independence with bed mobility , functional transfers , and functional mobility with ww. Therapist facilitated and provided cues for body alignment and hand/feet placement.   Skilled monitoring of O2 sats-   SpO2 at rest 98% on O2, SpO2 during activity 94% on room air, RN notified , SpO2 at end of session 95% Room air      Rehab Potential: Good  for established goals     Patient / Family Goal: get [facility]    Patient and/or family were instructed on functional diagnosis, prognosis/goals and OT plan of care. Demonstrated good- understanding.      Eval Complexity: Low    Time In: 2:25  Time Out: 2:55  Total Treatment Time:  minutes    Min Units   OT Eval Low 17864  x     OT Eval Medium 95155      OT Eval High 27962       OT Re-Eval [id number]       Therapeutic Ex 83896       Therapeutic Activities 91086      ADL/Self Care 94818      Orthotic Management 63949       Neuro Re-Ed 23173       Non-Billable Time          Evaluation Time includes thorough review of current medical information, gathering information on past medical history/social history and prior level of function, completion of standardized testing/informal observation of tasks, assessment of data and education on plan of care and goals.             [place], [place] #[id number]

## 2022-04-20 NOTE — PROGRESS NOTES
Pt post void residual was `415 ml MD notified, new order given to straight cath pt. He refused straight cath education given to him about refusal of care and he expressed understanding.  MD notified, will continue to monitor pt

## 2022-04-20 NOTE — DISCHARGE SUMMARY
Hospitalist Discharge Summary    Patient ID: Skyler Knott   Patient : [id number]  Patient's PCP: Darnell Son DO    Admit Date: 2022   Admitting Physician: Nancy Arechiga MD    Discharge Date:  2022  Discharge Physician: Sidney Sapp MD   Discharge Condition: Stable  Discharge Disposition: Assisted Living    History of presenting illness: Altered mental status  Urinary tract infection    Hospital course in brief:  (Please refer to daily progress notes for a comprehensive review of the hospitalization by requesting medical records)  80 y.o. male with past medical history of CAD HTN DM atrial fibrillation depression . Patient resides in an assisted lived facility . Apparently patient had become increasingly confused over several days per nursing home associated with weakness and difficulty walking . Work-up significant for urinary tract infection, patient started on antibiotics and admitted to the hospital for further care. Patient symptoms are much improved, currently able to hold conversation, follow command and not showing any signs of metabolic encephalopathy. Patient at this time is stable, will be discharged back to assisted living on oral antibiotics. PHYSICAL EXAM:       General appearance:  No apparent distress, appears stated age and cooperative. HEENT:  Normal cephalic, atraumatic without obvious deformity. Pupils equal, round, and reactive to light. Extra ocular muscles intact. Conjunctivae/corneas clear. Neck: Supple, with full range of motion. No jugular venous distention. Trachea midline. Respiratory:  Normal respiratory effort. Clear to auscultation, bilaterally without Rales/Wheezes/Rhonchi. Cardiovascular:  Regular rate and rhythm with normal S1/S2 without murmurs, rubs or gallops. Abdomen: Soft, non-tender, non-distended with normal bowel sounds. Musculoskeletal:  No clubbing, cyanosis or edema bilaterally. Full range of motion without deformity.   Skin: Skin color, texture, turgor normal.  No rashes or lesions. Neurologic:  Neurovascularly intact without any focal sensory/motor deficits. Cranial nerves: II-XII intact, grossly non-focal.  Psychiatric:  Alert and oriented x3 , thought content appropriate, normal insight    Consults:   IP CONSULT TO INTERNAL MEDICINE    Discharge Diagnoses:  Acute metabolic encephalopathy  Urinary tract infection    Discharge Instructions / Follow up:    Continued appropriate risk factor modification of blood pressure, diabetes and serum lipids will remain essential to reducing risk of future atherosclerotic development    Activity: activity as tolerated    Significant labs:  CBC:   Recent Labs     04/18/22 2145 04/20/22 0714   WBC 16.4* 11.5   RBC 4.16 4.14   HGB 9.8* 9.6*   HCT 31.2* 31.8*   MCV 75.0* 76.8*   RDW 18.1* 18.0*    259     BMP:   Recent Labs     04/18/22 2145 04/20/22 0714    140   K 4.0 4.2   CL 98 103   CO2 27 27   BUN 21 18   CREATININE 1.4* 1.2     LFT:  Recent Labs     04/18/22 2145   PROT 6.9   ALKPHOS 94   ALT 5   AST 12   BILITOT 0.4     PT/INR: No results for input(s): INR, APTT in the last 72 hours. BNP: No results for input(s): BNP in the last 72 hours.   Hgb A1C:   Lab Results   Component Value Date    LABA1C 7.0 10/22/2021     Folate and B12: No results found for: Antoniette Pringle, No results found for: FOLATE  Thyroid Studies: No results found for: TSH, V5KDRHI, T8XOHEL, THYROIDAB    Urinalysis:    Lab Results   Component Value Date    NITRU Negative 04/18/2022    WBCUA 10-20 04/18/2022    BACTERIA FEW 04/18/2022    RBCUA 2-5 04/18/2022    BLOODU LARGE 04/18/2022    SPECGRAV 1.020 04/18/2022    GLUCOSEU Negative 04/18/2022       Imaging:  CT Head WO Contrast    Result Date: 4/19/2022  EXAMINATION: CT OF THE HEAD WITHOUT CONTRAST  4/19/2022 12:18 am TECHNIQUE: CT of the head was performed without the administration of intravenous contrast. Dose modulation, iterative reconstruction, and/or weight based adjustment of the mA/kV was utilized to reduce the radiation dose to as low as reasonably achievable. COMPARISON: None. HISTORY: ORDERING SYSTEM PROVIDED HISTORY: AMS TECHNOLOGIST PROVIDED HISTORY: Reason for exam:->AMS Has a \"code stroke\" or \"stroke alert\" been called? ->No Decision Support Exception - unselect if not a suspected or confirmed emergency medical condition->Emergency Medical Condition (MA) What reading provider will be dictating this exam?->CRC FINDINGS: BRAIN/VENTRICLES: There is no acute intracranial hemorrhage, mass effect or midline shift. No abnormal extra-axial fluid collection. The gray-white differentiation is maintained without evidence of an acute infarct. Focal hypoattenuation in the left basal ganglia, compatible with lacunar infarct versus prominent perivascular space. There is no evidence of hydrocephalus. There are nonspecific hypoattenuating foci in the subcortical and periventricular white matter that most likely represent chronic microangiopathic ischemic changes in a patient of this age. There is diffuse cortical atrophy with ex vacuo dilatation of the ventricles. There are atherosclerotic calcifications of the intracranial vessels. ORBITS: The visualized portion of the orbits demonstrate no acute abnormality. SINUSES: The visualized paranasal sinuses and mastoid air cells demonstrate no acute abnormality. SOFT TISSUES/SKULL:  No acute abnormality of the visualized skull or soft tissues. Status post left frontotemporal craniotomy. No acute intracranial abnormality. XR CHEST PORTABLE    Result Date: 4/18/2022  EXAMINATION: ONE XRAY VIEW OF THE CHEST 4/18/2022 10:10 pm COMPARISON: 01/04/2022 HISTORY: ORDERING SYSTEM PROVIDED HISTORY: hypoxia, febrile TECHNOLOGIST PROVIDED HISTORY: Reason for exam:->hypoxia, febrile What reading provider will be dictating this exam?->CRC FINDINGS: The lungs are without acute focal process. There is no effusion or pneumothorax. Cardiomegaly. Sternotomy wires and right-sided transvenous pacer device. Elevation right hemidiaphragm. .  The osseous structures are without acute process. No acute process. Discharge Medications:      Medication List      START taking these medications    cefdinir 300 MG capsule  Commonly known as: OMNICEF  Take 1 capsule by mouth 2 times daily for 5 days        CHANGE how you take these medications    amLODIPine 2.5 MG tablet  Commonly known as: NORVASC  Take 1 tablet by mouth daily  What changed: how much to take     insulin 70-30 (70-30) 100 UNIT per ML injection vial  Commonly known as: NovoLIN 70/30  Take 16 units with breakfast and 10 units with dinner. What changed:   · how much to take  · additional instructions        CONTINUE taking these medications    Accu-Chek Kailee Soln  AS DIRECTED     acetaminophen 325 MG tablet  Commonly known as: TYLENOL     atorvastatin 10 MG tablet  Commonly known as: LIPITOR     bacitracin 500 UNIT/GM ointment  Apply topically 2 times daily. * BD SafetyGlide Insulin Syringe 29G X 1/2\" 0.3 ML Misc  Generic drug: Insulin Syringe-Needle U-100  USE TWICE DAILY     * Easy Touch Insulin Syringe 30G X 1/2\" 0.3 ML Misc  Generic drug: Insulin Syringe-Needle U-100  USE TWICE DAILY     Biofreeze Roll-On 4 % Gel  Generic drug: Menthol (Topical Analgesic)     budesonide 0.5 MG/2ML nebulizer suspension  Commonly known as: PULMICORT  USE 1 UNIT DOSE VIA NEBULIZER TWICE DAILY. diclofenac 50 MG EC tablet  Commonly known as: VOLTAREN  Take 1 tablet by mouth 2 times daily as needed for Pain     DULoxetine 30 MG extended release capsule  Commonly known as: CYMBALTA  TAKE 1 CAPSULE BY MOUTH ONCE A DAY.      Eliquis 5 MG Tabs tablet  Generic drug: apixaban     fluticasone 50 MCG/ACT nasal spray  Commonly known as: Flonase  1 spray by Each Nostril route daily     ipratropium-albuterol 0.5-2.5 (3) MG/3ML Soln nebulizer solution  Commonly known as: DUONEB  USE 1 VIAL IN NEBULIZER 3 TIMES DAILY     LASIX PO     lisinopril 20 MG tablet  Commonly known as: PRINIVIL;ZESTRIL     loratadine 10 MG tablet  Commonly known as: Claritin  Take 1 tablet by mouth daily     meclizine 12.5 MG tablet  Commonly known as: ANTIVERT     melatonin 3 MG Tabs tablet     metFORMIN 500 MG tablet  Commonly known as: GLUCOPHAGE     metoprolol 100 MG tablet  Commonly known as: LOPRESSOR     MILK OF MAGNESIA PO     mirtazapine 7.5 MG tablet  Commonly known as: REMERON  Take 1 tablet by mouth nightly     pantoprazole sodium 40 MG Pack packet  Commonly known as: PROTONIX     senna 8.6 MG Tabs tablet  Commonly known as: SENOKOT  TAKE 2 TABLET BY MOUTH AT BEDTIME FOR CONSTIPATION     tamsulosin 0.4 MG capsule  Commonly known as: FLOMAX  Take 1 capsule by mouth daily     traMADol 50 MG tablet  Commonly known as: ULTRAM     TUSSIN DM PO         * This list has 2 medication(s) that are the same as other medications prescribed for you. Read the directions carefully, and ask your doctor or other care provider to review them with you. Where to Get Your Medications      These medications were sent to [facility] [address] [place] [address]    Phone: [phone]   · cefdinir 300 MG capsule         Time Spent on discharge is more than 35 minutes in the examination, evaluation, counseling and review of medications and discharge plan.    +++++++++++++++++++++++++++++++++++++++++++++++++  Yandy Benitez MD  [facility], [facility]  +++++++++++++++++++++++++++++++++++++++++++++++++  NOTE: This report was transcribed using voice recognition software. Every effort was made to ensure accuracy; however, inadvertent computerized transcription errors may be present.

## 2022-04-20 NOTE — PROGRESS NOTES
Pt is anxious and continuously tries to get out bed, education provided to pt about safety.  MD notified new order given for 1 mg of ativan for anxiety

## 2022-05-19 PROBLEM — N39.0 URINARY TRACT INFECTION: Status: RESOLVED | Noted: 2022-01-01 | Resolved: 2022-01-01

## 2022-05-19 PROBLEM — N39.0 UTI (URINARY TRACT INFECTION): Status: RESOLVED | Noted: 2022-01-01 | Resolved: 2022-01-01

## 2022-07-01 PROBLEM — N39.0 UTI (URINARY TRACT INFECTION): Status: ACTIVE | Noted: 2022-01-01

## 2022-07-01 NOTE — ED PROVIDER NOTES
Department of Emergency Medicine   ED  Provider Note  Admit Date/RoomTime: 7/1/2022 11:12 AM  ED Room: 21/21          History of Present Illness:  7/1/22, Time: 11:39 AM EDT  Chief Complaint   Patient presents with    Urinary Tract Infection     + UA results from nursing home, malodorous urine, change in mental status (a&oX name and place), unsure of baseline orientation    Fatigue     generalized  weakness                Suellen Schwartz is a 80 y.o. male presenting to the ED for fatigue. Patient's been fatigued for the past couple of days. Came on gradually, nothing makes it better or worse, no associated pain. He did have a UA done this morning, he was found to have a UTI. He did not get treatment at the nursing home. He was sent in for evaluation. He has no other symptoms or complaints otherwise. Denies nausea, vomiting, neck pain or stiffness, paresthesias, cough, sputum, chest pain, shortness of breath, or any other symptoms or complaints. Patient is a DNR CC. Review of Systems:   Pertinent positives and negatives are stated within HPI, all other systems reviewed and are negative.        --------------------------------------------- PAST HISTORY ---------------------------------------------  Past Medical History:  has a past medical history of Atherosclerotic heart disease, Atrial fibrillation ([address] CAD (coronary artery disease), COPD (chronic obstructive pulmonary disease) ([address] Diabetes mellitus ([address] Hearing difficulty, Hyperlipidemia, Hypertension, Major depressive disorder, Pulmonary hypertension ([address] Skin ulcer of back with fat layer exposed ([address] and Traumatic hematoma of lower back. Past Surgical History:  has a past surgical history that includes Coronary artery bypass graft (?); Abdominal aortic aneurysm repair (?); joint replacement; knee surgery; brain surgery (2008?); pacemaker placement (1996 2016); and pre-malignant / benign skin lesion excision (Left, 2/20/2020).     Social History:  reports that he quit smoking about 47 years ago. His smoking use included cigarettes. He quit after 30.00 years of use. He has never used smokeless tobacco. He reports that he does not drink alcohol and does not use drugs. Family History: family history is not on file. . Unless otherwise noted, family history is non contributory    The patients home medications have been reviewed. Allergies: Patient has no known allergies. ---------------------------------------------------PHYSICAL EXAM--------------------------------------    Constitutional/General: Alert and oriented x3  Head: Normocephalic and atraumatic  Eyes: PERRL, EOMI, sclera non icteric  Mouth: Oropharynx clear, handling secretions, no trismus, no asymmetry of the posterior oropharynx or uvular edema  Neck: Supple, full ROM, no stridor, no meningeal signs  Respiratory: Lungs clear to auscultation bilaterally, no wheezes, rales, or rhonchi. Not in respiratory distress  Cardiovascular:  Regular rate. Regular rhythm. 2+ distal pulses. Equal extremity pulses. Chest: No chest wall tenderness  GI:  Abdomen Soft, Non tender, Non distended. No rebound, guarding, or rigidity. No pulsatile masses. Musculoskeletal: Moves all extremities x 4. Warm and well perfused, no clubbing, cyanosis, or edema. Capillary refill <3 seconds  Integument: skin warm and dry. No rashes. Neurologic: GCS 15, no focal deficits, symmetric strength 5/5 in the upper and lower extremities bilaterally  Psychiatric: Normal Affect          -------------------------------------------------- RESULTS -------------------------------------------------  I have personally reviewed all laboratory and imaging results for this patient. Results are listed below.      LABS: (Lab results interpreted by me)  Results for orders placed or performed during the hospital encounter of 07/01/22   CBC with Auto Differential   Result Value Ref Range    WBC 14.1 (H) 4.5 - 11.5 E9/L    RBC 4.29 3.80 - 5.80 E12/L    Hemoglobin 9.3 (L) 12.5 - 16.5 g/dL    Hematocrit 31.1 (L) 37.0 - 54.0 %    MCV 72.5 (L) 80.0 - 99.9 fL    MCH 21.7 (L) 26.0 - 35.0 pg    MCHC 29.9 (L) 32.0 - 34.5 %    RDW 19.4 (H) 11.5 - 15.0 fL    Platelets 836 773 - 869 E9/L    MPV 9.0 7.0 - 12.0 fL    Neutrophils % 71.2 43.0 - 80.0 %    Immature Granulocytes % 0.5 0.0 - 5.0 %    Lymphocytes % 12.1 (L) 20.0 - 42.0 %    Monocytes % 9.9 2.0 - 12.0 %    Eosinophils % 5.7 0.0 - 6.0 %    Basophils % 0.6 0.0 - 2.0 %    Neutrophils Absolute 10.03 (H) 1.80 - 7.30 E9/L    Immature Granulocytes # 0.07 E9/L    Lymphocytes Absolute 1.71 1.50 - 4.00 E9/L    Monocytes Absolute 1.39 (H) 0.10 - 0.95 E9/L    Eosinophils Absolute 0.80 (H) 0.05 - 0.50 E9/L    Basophils Absolute 0.09 0.00 - 0.20 E9/L   Comprehensive Metabolic Panel   Result Value Ref Range    Sodium 141 132 - 146 mmol/L    Potassium 3.6 3.5 - 5.0 mmol/L    Chloride 100 98 - 107 mmol/L    CO2 29 22 - 29 mmol/L    Anion Gap 12 7 - 16 mmol/L    Glucose 49 (L) 74 - 99 mg/dL    BUN 20 6 - 23 mg/dL    CREATININE 1.4 (H) 0.7 - 1.2 mg/dL    GFR Non-African American 47 >=60 mL/min/1.73    GFR African American 57     Calcium 8.9 8.6 - 10.2 mg/dL    Total Protein 6.7 6.4 - 8.3 g/dL    Albumin 3.4 (L) 3.5 - 5.2 g/dL    Total Bilirubin 0.5 0.0 - 1.2 mg/dL    Alkaline Phosphatase 81 40 - 129 U/L    ALT <5 0 - 40 U/L    AST 7 0 - 39 U/L   Troponin   Result Value Ref Range    Troponin, High Sensitivity 63 (H) 0 - 11 ng/L   Lactate, Sepsis   Result Value Ref Range    Lactic Acid, Sepsis 1.2 0.5 - 1.9 mmol/L   Urinalysis   Result Value Ref Range    Color, UA BLOODY Straw/Yellow    Clarity, UA Clear Clear    Glucose, Ur Negative Negative mg/dL    Bilirubin Urine Negative Negative    Ketones, Urine 15 (A) Negative mg/dL    Specific Gravity, UA 1.025 1.005 - 1.030    Blood, Urine LARGE (A) Negative    pH, UA 5.5 5.0 - 9.0    Protein,  (A) Negative mg/dL    Urobilinogen, Urine 1.0 <2.0 E.U./dL    Nitrite, Urine POSITIVE (A) Negative    Leukocyte Esterase, Urine MODERATE (A) Negative   Microscopic Urinalysis   Result Value Ref Range    WBC, UA 5-10 (A) 0 - 5 /HPF    RBC, UA PACKED 0 - 2 /HPF    Bacteria, UA RARE (A) None Seen /HPF   POCT Glucose   Result Value Ref Range    Meter Glucose 51 (L) 74 - 99 mg/dL   POCT Glucose   Result Value Ref Range    Glucose 139 mg/dL    QC OK? ok    POCT Glucose   Result Value Ref Range    Meter Glucose 139 (H) 74 - 99 mg/dL   EKG 12 Lead   Result Value Ref Range    Ventricular Rate 94 BPM    Atrial Rate 120 BPM    QRS Duration 94 ms    Q-T Interval 336 ms    QTc Calculation (Bazett) 420 ms    R Axis 21 degrees    T Axis -89 degrees   ,       RADIOLOGY:  Interpreted by Radiologist unless otherwise specified  CT HEAD WO CONTRAST   Final Result   1. No acute intracranial abnormality. 2. Chronic small vessel ischemic disease and generalized cerebral volume loss. 3. Postoperative changes from prior left craniotomy. XR CHEST PORTABLE   Final Result   Persistent right elevated hemidiaphragm. Small bilateral pleural effusions with compressive atelectasis. EKG Interpretation  Interpreted by emergency department physician, Dr. Zoë Walsh, rate 84, no STEMI, no ischemic change       ------------------------- NURSING NOTES AND VITALS REVIEWED ---------------------------   The nursing notes within the ED encounter and vital signs as below have been reviewed by myself  BP (!) 140/70   Pulse 90   Temp 98.4 °F (36.9 °C) (Oral)   Resp 15   SpO2 95%     Oxygen Saturation Interpretation: Normal    The patients available past medical records and past encounters were reviewed.         ------------------------------ ED COURSE/MEDICAL DECISION MAKING----------------------  Medications   0.9 % sodium chloride infusion ( IntraVENous New Bag 7/1/22 9756)   dextrose 50 % IV solution (25 g IntraVENous Given 7/1/22 0554)   cefepime (MAXIPIME) 2000 mg IVPB minibag (0 mg IntraVENous Stopped 7/1/22 5125)           The cardiac monitor revealed sinus with a heart rate in the 80s as interpreted by me. The cardiac monitor was ordered secondary to the patient's fatigue and to monitor the patient for dysrhythmia. CPT [id number]         Medical Decision Making:    Patient received dextrose. Did improve his mentation. Labs and imaging reviewed. Reevaluation, patient's resting comfortably. No symptoms or complaints. Awake and alert, discussed with daughter who is bedside. Patient wants to go back to his facility, he does not want to be admitted. Daughter states she is comfortable with him going back. Patient is a DNR CC. Shared decision-making was done, patient will be discharged on [address] He is to follow-up in 1 to 2 days. He is educated on signs and symptoms that require emergent valuation. Counseling: The emergency provider has spoken with the patient and discussed todays results, in addition to providing specific details for the plan of care and counseling regarding the diagnosis and prognosis. Questions are answered at this time and they are agreeable with the plan.       --------------------------------- IMPRESSION AND DISPOSITION ---------------------------------    IMPRESSION  1. Urinary tract infection without hematuria, site unspecified        DISPOSITION  Disposition: Discharge to home  Patient condition is stable        NOTE: This report was transcribed using voice recognition software.  Every effort was made to ensure accuracy; however, inadvertent computerized transcription errors may be present       Choco Helms MD  07/01/22 6663

## 2022-07-01 NOTE — LETTER
[address] Medicaid  CERTIFICATION OF NECESSITY  FOR TRANSPORTATION   BY WHEELCHAIR VAN     Individual Information   1. Name: Frandy Nick 2. [place] Medicaid Billing Number:    3. Address: [address]  [address]                                      Transport to [facility] Provider Information   4. Provider Name: [id number]   5. [place] Medicaid Provider Number:  National Provider Identifier (NPI):      Certification  7. Criteria:  By signing this document, the practitioner certifies that two statements are true:  A. This individual must be accompanied by a mobility-related assistive device from the point of pick-up to the point of drop-off. B. Transport of this individual by standard passenger vehicle or common carrier is precluded or contraindicated. 8. Period Beginning Date: 07/13/2022     9. Length  [x] Not more than 1 day(s)  [] One Year     Additional Information Relevant to Certification   10. Comments or Explanations, If Necessary or Appropriate     Altered mental status, urinary tract infection, acute cystitis without hematuria, COPD, minimum assist for transfers     Certifying Practitioner Information   11. Name of Practitioner: Hayden Gardiner MD   12. [place] Medicaid Provider Number, If Applicable:  [address] Provider Identifier (NPI):      Signature Information   14. Date of Signature: 7/13/2022   15. Name of Person Signing:   Eddie Calloway RN     16. Signature and Professional Designation: Electronically signed by Eddie Calloway RN on 7/13/2022 at 10:22 AM       OD 56060  Rev. 7/2015    [address] Encounter Date/Time: 7/1/2022 2159    Hospital Account: [de-identified]    MRN: [id number]    Patient: Frandy Nick    Contact Serial #: [id number]      ENCOUNTER          Patient Class: I Private Enc? No Unit RM BD: [facility] [id number]-E   Hospital Service:  INM   Encounter DX: UTI (urinary tract infec*   ADM Provider: Keira Chawla

## 2022-07-02 NOTE — H&P
Hospitalist History & Physical      PCP: Claire Lea DO    Date of Service: Pt seen/examined on 7/1/2022     Chief Complaint:  had concerns including Altered Mental Status (Pt just dc'd from ER back to assisted living, dx with UTI. Facility unable to fill Rx and start antibiotic so sent back to ER. Family states pt is altered). History Of Present Illness:    Mr. Navin Alex, a 80y.o. year old male  who  has a past medical history of Atherosclerotic heart disease, Atrial fibrillation ([address] CAD (coronary artery disease), COPD (chronic obstructive pulmonary disease) ([address] Diabetes mellitus ([address] Hearing difficulty, Hyperlipidemia, Hypertension, Major depressive disorder, Pulmonary hypertension ([address] Skin ulcer of back with fat layer exposed ([address] and Traumatic hematoma of lower back. Patient presents from the nursing home for increasing confusion. The patient was seen earlier today and was found to have a UTI. The patient is a DNR CC and using shared decision making the patient was discharged home to the nursing home on antibiotics. Per EMS the patient has had some increasing confusion at the nursing home and family would like him reevaluated therefore they sent him to the ED for further evaluation. Per family once the patient got back to assisted living he was having difficulty ambulating and was having some intermittent confusion therefore they felt unsafe keeping him at the assisted living facility at this time. Vital signs within normal limits and stable. Laboratory studies demonstrate creatinine 1.4, troponin 63, BBC 14.1, hemoglobin 9.3. Urinalysis reveals positive nitrites, moderate leukocyte esterase, 5-10 WBC, packed RBC. CT head and chest x-ray were unremarkable. Patient given dose of ceftriaxone. Medicine consulted for admission.         Past Medical History:   Diagnosis Date    Atherosclerotic heart disease     Atrial fibrillation (HCC)     CAD (coronary artery disease)  [facility]) [facility]     Diabetes mellitus ([facility]     Hearing difficulty     Hyperlipidemia     Hypertension     Major depressive disorder     Pulmonary hypertension ([facility]     Skin ulcer of back with fat layer exposed ([facility] 5/8/2019    Traumatic hematoma of lower back 5/8/2019       Past Surgical History:   Procedure Laterality Date    ABDOMINAL AORTIC ANEURYSM REPAIR  ?   [address]  2008?    hematoma    CORONARY ARTERY BYPASS GRAFT  ?    double? 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 Authorizing Provider   cefdinir (OMNICEF) 300 MG capsule Take 1 capsule by mouth 2 times daily for 10 days 7/1/22 7/11/22  Daria Riggins MD   budesonide (PULMICORT) 0.5 MG/2ML nebulizer suspension USE 1 UNIT DOSE VIA NEBULIZER TWICE DAILY. 6/21/22   Mitesh Gustafson,    DULoxetine (CYMBALTA) 30 MG extended release capsule TAKE 1 CAPSULE BY MOUTH ONCE A DAY. 6/6/22   Anay Needle, DO   apixaban (ELIQUIS) 5 MG TABS tablet Take 5 mg by mouth 2 times daily    Historical Provider, MD   traMADol (ULTRAM) 50 MG tablet Take 50 mg by mouth every 6 hours as needed for Pain.     Historical Provider, MD   Blood Glucose Calibration (ACCU-CHEK EDNA) SOLN AS DIRECTED 4/4/22   Cueva Needle, DO   EASY TOUCH INSULIN SYRINGE 30G X 1/2\" 0.3 ML MISC USE TWICE DAILY 3/16/21   Anay Lozano, DO   diclofenac (VOLTAREN) 50 MG EC tablet Take 1 tablet by mouth 2 times daily as needed for Pain 3/10/21   Mitesh Gustafson DO   senna (SENOKOT) 8.6 MG TABS tablet TAKE 2 TABLET BY MOUTH AT BEDTIME FOR CONSTIPATION 3/4/21   Mitesh Gustafson, DO   BD SAFETYGLIDE INSULIN SYRINGE 29G X 1/2\" 0.3 ML MISC USE TWICE DAILY 1/26/21   Mitesh Gustafson DO   insulin 70-30 (NOVOLIN 70/30) (70-30) 100 UNIT per ML injection vial Take 16 units with breakfast and 10 units with dinner. Patient taking differently: 16 Units Take 16 units with breakfast 8/4/20   Gayathri Lindquist DO   mirtazapine (REMERON) 7.5 MG tablet Take 1 tablet by mouth nightly 7/24/20   Mitesh Russell DO   fluticasone (FLONASE) 50 MCG/ACT nasal spray 1 spray by Each Nostril route daily 4/28/20   Mitesh Gustafson DO   ipratropium-albuterol (DUONEB) 0.5-2.5 (3) MG/3ML SOLN nebulizer solution USE 1 VIAL IN NEBULIZER 3 TIMES DAILY 3/18/20   Mitesh Gustafson DO   bacitracin 500 UNIT/GM ointment Apply topically 2 times daily.  2/20/20   RACHEL Madrigal   meclizine (ANTIVERT) 12.5 MG tablet Take 25 mg by mouth 4 times daily as needed     Historical Provider, MD   Dextromethorphan-guaiFENesin (TUSSIN DM PO) Take 10 mLs by mouth as needed  1/30/20   Historical Provider, MD   amLODIPine (NORVASC) 2.5 MG tablet Take 1 tablet by mouth daily  Patient taking differently: Take 5 mg by mouth daily  2/11/20   Mitesh Gustafson DO   loratadine (CLARITIN) 10 MG tablet Take 1 tablet by mouth daily 11/1/19   Gayathri Lindquist DO   tamsulosin (FLOMAX) 0.4 MG capsule Take 1 capsule by mouth daily 6/10/19   Mitesh Gustafson DO   acetaminophen (TYLENOL) 325 MG tablet Take 650 mg by mouth every 4 hours as needed for Pain    Historical Provider, MD   Menthol, Topical Analgesic, (BIOFREEZE ROLL-ON) 4 % GEL Apply topically    Historical Provider, MD   Magnesium Hydroxide (MILK OF MAGNESIA PO) Take by mouth 2 tablespoon every 3 days as needed    Historical Provider, MD   pantoprazole sodium (PROTONIX) 40 MG PACK packet Take 40 mg by mouth every morning (before breakfast)    Historical Provider, MD   metoprolol (LOPRESSOR) 100 MG tablet Take 50 mg by mouth 2 times daily Historical Provider, MD   melatonin 3 MG TABS tablet Take 3 mg by mouth nightly as needed    Historical Provider, MD   atorvastatin (LIPITOR) 10 MG tablet Take 10 mg by mouth daily    Historical Provider, MD   metFORMIN (GLUCOPHAGE) 500 MG tablet Take 500 mg by mouth 2 times daily     Historical Provider, MD   Furosemide (LASIX PO) Take 20 mg by mouth daily     Historical Provider, MD   lisinopril (PRINIVIL;ZESTRIL) 20 MG tablet Take 20 mg by mouth     Historical Provider, MD         Allergies:  Patient has no known allergies. Social History:    TOBACCO:   reports that he quit smoking about 47 years ago. His smoking use included cigarettes. He quit after 30.00 years of use. He has never used smokeless tobacco.  ETOH:   reports no history of alcohol use. Family History:    Reviewed in detail and negative for DM, CAD, Cancer, CVA. Positive as follows\"  History reviewed. No pertinent family history. REVIEW OF SYSTEMS:   Pertinent positives as noted in the HPI. All other systems reviewed and negative. PHYSICAL EXAM:  /75   Pulse (!) 105   Temp 98.3 °F (36.8 °C) (Oral)   Resp 20   Ht 5' 10\" (1.778 m)   Wt 185 lb (83.9 kg)   SpO2 98%   BMI 26.54 kg/m²   General appearance: Confused  HEENT: Normal cephalic, atraumatic without obvious deformity. Neck: Supple, with full range of motion. No jugular venous distention. Trachea midline. Respiratory: Clear to auscultation bilaterally  Cardiovascular: Regular rate and rhythm  Abdomen: Soft, nontender, nondistended  Musculoskeletal: No clubbing, cyanosis, edema of bilateral lower extremities. Brisk capillary refill. Skin: Normal skin color. No rashes or lesions.   Neurologic: Confused, no focal deficits      CBC:   Recent Labs     07/01/22  1127   WBC 14.1*   RBC 4.29   HGB 9.3*   HCT 31.1*   MCV 72.5*   RDW 19.4*        BMP:   Recent Labs     07/01/22  1127      K 3.6      CO2 29   BUN 20   CREATININE 1.4*     LFT:  Recent Labs 07/01/22  1127   PROT 6.7   ALKPHOS 81   ALT <5   AST 7   BILITOT 0.5     CE:  No results for input(s): Blanquita Mould in the last 72 hours. PT/INR: No results for input(s): INR, APTT in the last 72 hours. BNP: No results for input(s): BNP in the last 72 hours. ESR: No results found for: SEDRATE  CRP: No results found for: CRP  D Dimer: No results found for: DDIMER   Folate and B12: No results found for: [id number], No results found for: FOLATE  Lactic Acid:   Lab Results   Component Value Date    LACTA 1.0 11/30/2021     Thyroid Studies: No results found for: TSH, Valeria Golder    Oupatient labs:  Lab Results   Component Value Date    CHOL 69 06/21/2022    TRIG 73 06/21/2022    HDL 28 (A) 06/21/2022    LDLCALC 26 06/21/2022    INR 1.6 10/08/2021    LABA1C 6.8 06/21/2022       Urinalysis:    Lab Results   Component Value Date/Time    NITRU POSITIVE 07/01/2022 02:20 PM    WBCUA 5-10 07/01/2022 02:20 PM    BACTERIA RARE 07/01/2022 02:20 PM    RBCUA PACKED 07/01/2022 02:20 PM    BLOODU LARGE 07/01/2022 02:20 PM    SPECGRAV 1.025 07/01/2022 02:20 PM    GLUCOSEU Negative 07/01/2022 02:20 PM       Imaging:  CT HEAD WO CONTRAST    Result Date: 7/1/2022  EXAMINATION: CT OF THE HEAD WITHOUT CONTRAST  7/1/2022 12:16 pm TECHNIQUE: CT of the head was performed without the administration of intravenous contrast. Automated exposure control, iterative reconstruction, and/or weight based adjustment of the mA/kV was utilized to reduce the radiation dose to as low as reasonably achievable. COMPARISON: CT head 04/19/2022 HISTORY: ORDERING SYSTEM PROVIDED HISTORY: trauma TECHNOLOGIST PROVIDED HISTORY: Has a \"code stroke\" or \"stroke alert\" been called? ->No Reason for exam:->trauma Decision Support Exception - unselect if not a suspected or confirmed emergency medical condition->Emergency Medical Condition (MA) What reading provider will be dictating this exam?->CRC FINDINGS: There is patchy and confluent hypoattenuation within the white matter of the bilateral cerebral hemispheres. There is no evidence of mass, mass effect, or midline shift. There is enlargement of the ventricles and sulci suggesting generalized cerebral volume loss. No extra-axial fluid collections or acute hemorrhage. The gray-white differentiation appears preserved without evidence of acute cortical ischemia. There are changes of prior left craniotomy. There is incomplete visualization of minimal mucosal disease of the paranasal sinuses. The visualized mastoid air cells are clear. 1. No acute intracranial abnormality. 2. Chronic small vessel ischemic disease and generalized cerebral volume loss. 3. Postoperative changes from prior left craniotomy. XR CHEST PORTABLE    Result Date: 7/1/2022  EXAMINATION: ONE XRAY VIEW OF THE CHEST 7/1/2022 11:33 am COMPARISON: Chest radiograph April 18, 2022 HISTORY: ORDERING SYSTEM PROVIDED HISTORY: chest pain TECHNOLOGIST PROVIDED HISTORY: Reason for exam:->chest pain What reading provider will be dictating this exam?->CRC FINDINGS: Right cardiac pacing device again identified. Persistent elevated right hemidiaphragm. Airspace opacities again demonstrated in lower lungs bilaterally. There is blunting of bilateral costophrenic angles. No pneumothorax. The cardiomediastinal silhouette is without acute process. The osseous structures are without acute process. Persistent right elevated hemidiaphragm. Small bilateral pleural effusions with compressive atelectasis.        ASSESSMENT:  -Altered mental status  -Urinary tract infection  -Leukocytosis  -Elevated troponin  -Atrial fibrillation  -COPD  -Diabetes mellitus  -Hypertension  -Hyperlipidemia      PLAN:  -Admit to medicine  -Ceftriaxone 1 g daily  -Urine cultures  -Repeat troponin  -Continue home medications        Diet: No diet orders on file  Code Status: Prior  Surrogate decision maker confirmed with patient:   Extended Emergency Contact Information  Primary Emergency Contact: [address] [place] [address] Road Phone: [phone]  Mobile Phone: [phone]  Relation: Child  Secondary Emergency Contact: Kyle Duff  Mobile Phone: [phone]  Relation: Child    DVT Prophylaxis: []Lovenox []Heparin []PCD [] [address] []Encouraged ambulation  Disposition: []Med/Surg [] Intermediate [] ICU/CCU  Admit status: [] Observation [] Inpatient     +++++++++++++++++++++++++++++++++++++++++++++++++  Sebastián Farmer DO  +++++++++++++++++++++++++++++++++++++++++++++++++  NOTE: This report was transcribed using voice recognition software. Every effort was made to ensure accuracy; however, inadvertent computerized transcription errors may be present.

## 2022-07-02 NOTE — ED PROVIDER NOTES
HPI:  7/1/22,   Time: 10:06 PM EDT       Kelly Guzmán is a 80 y.o. male presenting to the ED for altered mental status, beginning 1 day ago. The complaint has been persistent, moderate in severity, and worsened by nothing. The patient presents from the nursing home for increasing confusion. The patient was seen earlier today and was found to have a UTI. The patient is a DNR CC and using shared decision making the patient was discharged home to the nursing home on antibiotics. Per EMS the patient has had some increasing confusion at the nursing home and family would like him reevaluated therefore they sent him to the ED for further evaluation. HPI limited by patient's condition\\    Per family once the patient got back to assisted living he was having difficulty ambulating and was having some intermittent confusion therefore they felt unsafe keeping him at the assisted living facility at this time. Review of Systems:   Unable be obtained due to patient's confusion          --------------------------------------------- PAST HISTORY ---------------------------------------------  Past Medical History:  has a past medical history of Atherosclerotic heart disease, Atrial fibrillation ([facility] CAD (coronary artery disease), COPD (chronic obstructive pulmonary disease) ([facility] Diabetes mellitus ([facility] Hearing difficulty, Hyperlipidemia, Hypertension, Major depressive disorder, Pulmonary hypertension ([facility] Skin ulcer of back with fat layer exposed ([facility] and Traumatic hematoma of lower back. Past Surgical History:  has a past surgical history that includes Coronary artery bypass graft (?); Abdominal aortic aneurysm repair (?); joint replacement; knee surgery; brain surgery (2008?); pacemaker placement (1996 2016); and pre-malignant / benign skin lesion excision (Left, 2/20/2020). Social History:  reports that he quit smoking about 47 years ago. His smoking use included cigarettes.  He quit after 30.00 years of use. He has never used smokeless tobacco. He reports that he does not drink alcohol and does not use drugs. Family History: family history is not on file. The patients home medications have been reviewed. Allergies: Patient has no known allergies. ---------------------------------------------------PHYSICAL EXAM--------------------------------------    Constitutional/General: Alert and oriented x2,  NAD  Head: Normocephalic and atraumatic  Eyes: PERRL, EOMI, conjunctive normal, sclera non icteric  Mouth: Oropharynx clear, handling secretions, no trismus, no asymmetry of the posterior oropharynx or uvular edema  Neck: Supple, full ROM, non tender to palpation in the midline, no stridor, no crepitus, no meningeal signs  Respiratory: Lungs clear to auscultation bilaterally, no wheezes, rales, or rhonchi. Not in respiratory distress  Cardiovascular: Tachycardic, irregularly irregular rhythm. No murmurs, gallops, or rubs. 2+ distal pulses  Chest: No chest wall tenderness  GI:  Abdomen Soft, Non tender, Non distended. +BS. No organomegaly, no palpable masses,  No rebound, guarding, or rigidity. Musculoskeletal: Moves all extremities x 4. Warm and well perfused, no clubbing, cyanosis, or edema. Capillary refill <3 seconds  Integument: skin warm and dry. No rashes. Neurologic: GCS 14, no focal deficits, symmetric strength 5/5 in the upper and lower extremities bilaterally  Psychiatric: Normal Affect    -------------------------------------------------- RESULTS -------------------------------------------------  I have personally reviewed all laboratory and imaging results for this patient. Results are listed below. LABS:  Results for orders placed or performed during the hospital encounter of 07/01/22   POCT Glucose   Result Value Ref Range    Glucose 141 mg/dL    QC OK?  yes    POCT Glucose   Result Value Ref Range    Meter Glucose 143 (H) 74 - 99 mg/dL       RADIOLOGY:  Interpreted by Radiologist.  No orders to display       ------------------------- NURSING NOTES AND VITALS REVIEWED ---------------------------   The nursing notes within the ED encounter and vital signs as below have been reviewed by myself. /75   Pulse 99   Temp 98.3 °F (36.8 °C) (Oral)   Resp 25   Ht 5' 10\" (1.778 m)   Wt 185 lb (83.9 kg)   SpO2 99%   BMI 26.54 kg/m²   Oxygen Saturation Interpretation: Normal    The patients available past medical records and past encounters were reviewed. ------------------------------ ED COURSE/MEDICAL DECISION MAKING----------------------  Medications   0.9 % sodium chloride infusion (1,000 mLs IntraVENous New Bag 7/1/22 2237)   cefTRIAXone (ROCEPHIN) 1,000 mg in sterile water 10 mL IV syringe (has no administration in time range)         ED COURSE:       Medical Decision Making:    Is a 80-year-old male who presented to the ED for confusion. Otherwise he is not on patient underwent laboratory work-up earlier today which showed a leukocytosis of 14.1 with a hemoglobin of 9.3. Patient's lactic acid normal.  Troponin was 63. Chemistry showed a creatinine of 1.4. Urinalysis did show UTI. Head CT showed no acute findings chronic microvascular disease noted. This x-ray did show small bilateral pleural effusions with compressive atelectasis. Patient on baseline oxygen. Patient received cefepime earlier today. Due for second dose at 1 AM.  Patient neurologically intact at this time. No focal deficits. Case discussed with Dr. Manuelito Sky is accept the patient for admission. I, Dr. Sigmund Dubin, am the primary provider for this encounter    This patient's ED course included: a personal history and physicial examination, re-evaluation prior to disposition, multiple bedside re-evaluations, IV medications, cardiac monitoring and continuous pulse oximetry    This patient has remained hemodynamically stable during their ED course.       Re-Evaluations: Re-evaluation. Patients symptoms show no change      Counseling: The emergency provider has spoken with the patient and discussed todays results, in addition to providing specific details for the plan of care and counseling regarding the diagnosis and prognosis. Questions are answered at this time and they are agreeable with the plan.       --------------------------------- IMPRESSION AND DISPOSITION ---------------------------------    IMPRESSION  1. Altered mental status, unspecified altered mental status type    2. Acute cystitis without hematuria        DISPOSITION  Disposition: Admit to med/surg floor  Patient condition is stable    NOTE: This report was transcribed using voice recognition software.  Every effort was made to ensure accuracy; however, inadvertent computerized transcription errors may be present        Alejo Hoff DO  07/01/22 9512

## 2022-07-02 NOTE — ED NOTES
Report called to [facility] staff. Family aware of pt's impending transfer to room 8409. Pt placed in transport que. Will cont to monitor.      Fito Hardin RN  07/01/22 2093

## 2022-07-02 NOTE — PROGRESS NOTES
Hospitalist Progress Note      Synopsis: Patient with a hx of atrial fibrillation, CAD, COPD, DM2, HTN, depression, pulmonary hypertension admitted on 7/1/2022 after presenting from nursing home for increasing confusion in setting of newly diagnosed UTI. Pt given rocephin and admitted for further evaluation and treatment. Subjective  Confusion improved  Reports he has had intermittent recent chest pain   Afebrile overnight   On 2L oxygen    Exam:  BP (!) 131/91   Pulse 89   Temp 98.6 °F (37 °C) (Oral)   Resp 18   Ht 5' 10\" (1.778 m)   Wt 185 lb (83.9 kg)   SpO2 100%   BMI 26.54 kg/m²   General appearance: No apparent distress, appears stated age and cooperative. HEENT: Pupils equal, round, and reactive to light. Conjunctivae/corneas clear. Neck: Supple. No jugular venous distention. Trachea midline. Respiratory:  Normal respiratory effort. Clear to auscultation, bilaterally without Rales/Wheezes/Rhonchi. Cardiovascular: Irregularly irregular with normal S1/S2 without murmurs, rubs or gallops. Abdomen: Soft, non-tender, non-distended with normal bowel sounds. Musculoskeletal: No clubbing, cyanosis or edema bilaterally. Brisk capillary refill. 2+ lower extremity pulses (dorsalis pedis).    Skin:  No rashes    Neurologic: awake, alert and following commands but confused    Medications:  Reviewed    Infusion Medications    sodium chloride      sodium chloride 1,000 mL (07/01/22 2237)     Scheduled Medications    amLODIPine  5 mg Oral Daily    apixaban  5 mg Oral BID    [START ON 7/3/2022] atorvastatin  10 mg Oral Daily    budesonide  0.5 mg Nebulization BID    DULoxetine  30 mg Oral Daily    fluticasone  1 spray Each Nostril Daily    lisinopril  20 mg Oral Daily    cetirizine  10 mg Oral Daily    metFORMIN  500 mg Oral BID WC    metoprolol  50 mg Oral BID    mirtazapine  7.5 mg Oral Nightly    pantoprazole  40 mg Oral QAM AC    tamsulosin  0.4 mg Oral Daily    sodium chloride flush 10 mL IntraVENous 2 times per day    cefTRIAXone (ROCEPHIN) IV  1,000 mg IntraVENous Q24H     PRN Meds: melatonin, sodium chloride flush, sodium chloride, promethazine **OR** ondansetron, polyethylene glycol, acetaminophen **OR** acetaminophen    I/O    Intake/Output Summary (Last 24 hours) at 7/2/2022 0939  Last data filed at 7/2/2022 0840  Gross per 24 hour   Intake 557 ml   Output 175 ml   Net 382 ml       Labs:   Recent Labs     07/01/22 1127 07/02/22  0456   WBC 14.1* 14.4*   HGB 9.3* 9.2*   HCT 31.1* 31.0*    290       Recent Labs     07/01/22 1127 07/02/22  0456    138   K 3.6 3.6    98   CO2 29 26   BUN 20 17   CREATININE 1.4* 1.3*   CALCIUM 8.9 8.3*       Recent Labs     07/01/22 1127 07/02/22  0456   PROT 6.7 6.5   ALKPHOS 81 75   ALT <5 <5   AST 7 7   BILITOT 0.5 0.4       No results for input(s): INR in the last 72 hours. No results for input(s): Rhae Childes in the last 72 hours. Chronic labs:  Lab Results   Component Value Date    CHOL 69 06/21/2022    TRIG 73 06/21/2022    HDL 28 (A) 06/21/2022    LDLCALC 26 06/21/2022    INR 1.6 10/08/2021    LABA1C 6.8 06/21/2022       Radiology:  Imaging studies reviewed today. ASSESSMENT:  Acute metabolic encephalopathy  Sepsis 2/2 UTI  Atrial fibrillation  Elevated troponin and recent chest pain   COPD  DM2  HTN  HLD  CKD    PLAN:  Continue rocephin pending cultures  Consult cardiology given recent chest pain and elevated troponin   Continue home meds as ordered  Strict I/O, monitor renal function  Hold metformin  Clarify code status       Diet: ADULT DIET; Regular  Code Status: DNR-CCA  PT/OT Eval Status:   ordered  DVT Prophylaxis:   eliquis  Recommended disposition at discharge:  back to facility at [place]     +++++++++++++++++++++++++++++++++++++++++++++++++  Glenn Bay MD   [facility].  +++++++++++++++++++++++++++++++++++++++++++++++++  NOTE: This report was transcribed using voice recognition software. Every effort was made to ensure accuracy; however, inadvertent computerized transcription errors may be present.

## 2022-07-02 NOTE — PLAN OF CARE
Problem: Safety - Adult  Goal: Free from fall injury  7/2/2022 1128 by Sneha Villareal RN  Outcome: Progressing     Problem: ABCDS Injury Assessment  Goal: Absence of physical injury  Outcome: Progressing     Problem: Skin/Tissue Integrity  Goal: Absence of new skin breakdown  Description: 1. Monitor for areas of redness and/or skin breakdown  2. Assess vascular access sites hourly  3. Every 4-6 hours minimum:  Change oxygen saturation probe site  4. Every 4-6 hours:  If on nasal continuous positive airway pressure, respiratory therapy assess nares and determine need for appliance change or resting period.   Outcome: Progressing

## 2022-07-03 NOTE — CONSULTS
Inpatient Cardiology Consultation      Reason for Consult: Chest pain and elevated troponin    Consulting Physician: Dr. Indu Mercer    Requesting Physician:  Dr. Michael Ellis    Date of Consultation: 7/3/2022    HISTORY OF PRESENT ILLNESS:     This 80-year-old male is not known to [facility] cardiology and denies any cardiac history. He has a history of coronary artery disease and a CABG and has a permanent pacemaker, chronic atrial fibrillation and on oral anticoagulation. The family states he has been following with the [place] cardiologist but has not been there in a few years due to transportation issues. His pacemaker is checked by phone transmissions . The following information is taken from a review of electronic medical records as the patient is confused. I also spoke with his daughter and his power of  who is his son Tan Prado. Mr. Sarahi Nava lives in assisted living and has been deteriorating this year. He has not been very active despite physical therapy. He has been ambulating with a walker and falls. Prior to admission he had not been eating and drinking well. According to the assisted living facility. His daughter was told that is his choice if he does not want to eat or drink. He then developed mental status changes and he did have a fall this week. It is unclear if he had syncope or if he is hitting his head. He presented to the emergency room July 1 with fatigue and was found to have a urinary tract infection at a nursing home. EKG on admission showed atrial fibrillation    He received 50% dextrose and his mentation improved. He was also treated with antibiotics and was hydrated. Blood pressure on admission was 118/75 and his heart rate was 105 bpm and he was afebrile with no hypoxia on nasal cannula. He was hypoglycemic on admission at 52. Potassium was 3.6 with a BUN of 20 and a creatinine of 1.4, his baseline creatinine is 1.2-1.4 from a prior admission in January of this year. Troponins are 63-68 (in January his troponins were 62 and 66), WBCs 14 and H&H 9.3 and 31.1 and he does have a history of anemia, serum glucose was 49, blood cultures have been drawn, positive for UTI. 2D echocardiogram has been ordered. Past medical history  1. Former smoker, quit 1974  2. Skin ulcer of the back with a traumatic hematoma to the lower back  3. Hypertension  4. Diabetes, insulin requiring  5. Hyperlipidemia  6. Major depression  7. COPD/chronic hypoxic respiratory failure and wears home oxygen  8. Hard of hearing  9. DNR CC according to the family, Carley Alfredo  10. Aminal aortic aneurysm repair  11. Coronary artery disease and a CABG in unknown year  12. Pacer in 1996, unclear who placed the pacemaker or reason why and it was replaced a few years ago due to battery at end-of-life, by the [facility]  13. Skin cancer  14. 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 COVID-19 in December 2021  17. Anemia  18. 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 Craniotomy for hematoma questionably in 2008  21. Traumatic hematoma of the lower back in 2019  22. Frequent falls, unclear etiology      Medications Prior to admit:  Prior to Admission medications    Medication Sig Start Date End Date Taking? Authorizing Provider   budesonide (PULMICORT) 0.5 MG/2ML nebulizer suspension USE 1 UNIT DOSE VIA NEBULIZER TWICE DAILY. 6/21/22   Mitesh Gustafson, DO   DULoxetine (CYMBALTA) 30 MG extended release capsule TAKE 1 CAPSULE BY MOUTH ONCE A DAY. 6/6/22   Heather Murry, DO   apixaban (ELIQUIS) 5 MG TABS tablet Take 5 mg by mouth 2 times daily    Historical Provider, MD   traMADol (ULTRAM) 50 MG tablet Take 50 mg by mouth every 6 hours as needed for Pain.     Historical Provider, MD   diclofenac (VOLTAREN) 50 MG EC tablet Take 1 tablet by mouth 2 times daily as needed for Pain 3/10/21   Mitesh Gustafson, DO   senna ([address]) 8.6 MG TABS tablet TAKE 2 TABLET BY MOUTH AT BEDTIME FOR CONSTIPATION 3/4/21   Mitesh Gustafson DO   insulin 70-30 (NOVOLIN 70/30) (70-30) 100 UNIT per ML injection vial Take 16 units with breakfast and 10 units with dinner. Patient taking differently: 16 Units Take 16 units with breakfast 8/4/20   Carmelina Wright DO   mirtazapine (REMERON) 7.5 MG tablet Take 1 tablet by mouth nightly 7/24/20   Mitesh Yu DO   fluticasone (FLONASE) 50 MCG/ACT nasal spray 1 spray by Each Nostril route daily 4/28/20   Mitesh Gustafson DO   ipratropium-albuterol (DUONEB) 0.5-2.5 (3) MG/3ML SOLN nebulizer solution USE 1 VIAL IN NEBULIZER 3 TIMES DAILY 3/18/20   Mitesh Gustafson DO   bacitracin 500 UNIT/GM ointment Apply topically 2 times daily.  2/20/20   RACHEL Winchester   meclizine (ANTIVERT) 12.5 MG tablet Take 25 mg by mouth 4 times daily as needed     Historical Provider, MD   Dextromethorphan-guaiFENesin (TUSSIN DM PO) Take 10 mLs by mouth as needed  1/30/20   Historical Provider, MD   amLODIPine (NORVASC) 2.5 MG tablet Take 1 tablet by mouth daily  Patient taking differently: Take 5 mg by mouth daily  2/11/20   Mitesh Gustafson DO   loratadine (CLARITIN) 10 MG tablet Take 1 tablet by mouth daily 11/1/19   Carmelina Wright DO   tamsulosin (FLOMAX) 0.4 MG capsule Take 1 capsule by mouth daily 6/10/19   Mitehs Gustafson DO   acetaminophen (TYLENOL) 325 MG tablet Take 650 mg by mouth every 4 hours as needed for Pain    Historical Provider, MD   Menthol, Topical Analgesic, (BIOFREEZE ROLL-ON) 4 % GEL Apply topically    Historical Provider, MD   Magnesium Hydroxide (MILK OF MAGNESIA PO) Take by mouth 2 tablespoon every 3 days as needed    Historical Provider, MD   pantoprazole sodium (PROTONIX) 40 MG PACK packet Take 40 mg by mouth every morning (before breakfast)    Historical Provider, MD   metoprolol (LOPRESSOR) 100 MG tablet Take 50 mg by mouth 2 times daily     Historical Provider, MD   melatonin 3 MG TABS tablet Take 3 mg by mouth nightly as needed    Historical Provider, MD   atorvastatin (LIPITOR) 10 MG tablet Take 10 mg by mouth daily    Historical Provider, MD   metFORMIN (GLUCOPHAGE) 500 MG tablet Take 500 mg by mouth 2 times daily     Historical Provider, MD   Furosemide (LASIX PO) Take 20 mg by mouth daily     Historical Provider, MD   lisinopril (PRINIVIL;ZESTRIL) 20 MG tablet Take 20 mg by mouth     Historical Provider, MD       Current Medications:    Current Facility-Administered Medications: amLODIPine (NORVASC) tablet 5 mg, 5 mg, Oral, Daily  apixaban (ELIQUIS) tablet 5 mg, 5 mg, Oral, BID  atorvastatin (LIPITOR) tablet 10 mg, 10 mg, Oral, Daily  budesonide (PULMICORT) nebulizer suspension 500 mcg, 0.5 mg, Nebulization, BID  DULoxetine (CYMBALTA) extended release capsule 30 mg, 30 mg, Oral, Daily  fluticasone (FLONASE) 50 MCG/ACT nasal spray 1 spray, 1 spray, Each Nostril, Daily  lisinopril (PRINIVIL;ZESTRIL) tablet 20 mg, 20 mg, Oral, Daily  cetirizine (ZYRTEC) tablet 10 mg, 10 mg, Oral, Daily  melatonin tablet 3 mg, 3 mg, Oral, Nightly PRN  [Held by provider] metFORMIN (GLUCOPHAGE) tablet 500 mg, 500 mg, Oral, BID WC  metoprolol tartrate (LOPRESSOR) tablet 50 mg, 50 mg, Oral, BID  mirtazapine (REMERON) tablet 7.5 mg, 7.5 mg, Oral, Nightly  pantoprazole (PROTONIX) tablet 40 mg, 40 mg, Oral, QAM AC  tamsulosin (FLOMAX) capsule 0.4 mg, 0.4 mg, Oral, Daily  sodium chloride flush 0.9 % injection 10 mL, 10 mL, IntraVENous, 2 times per day  sodium chloride flush 0.9 % injection 10 mL, 10 mL, IntraVENous, PRN  0.9 % sodium chloride infusion, , IntraVENous, PRN  promethazine (PHENERGAN) tablet 12.5 mg, 12.5 mg, Oral, Q6H PRN **OR** ondansetron (ZOFRAN) injection 4 mg, 4 mg, IntraVENous, Q6H PRN  polyethylene glycol (GLYCOLAX) packet 17 g, 17 g, Oral, Daily PRN  acetaminophen (TYLENOL) tablet 650 mg, 650 mg, Oral, Q6H PRN **OR** acetaminophen (TYLENOL) suppository 650 mg, 650 mg, Rectal, Q6H PRN  aspirin EC tablet 81 mg, 81 mg, Oral, Daily  perflutren lipid microspheres (DEFINITY) injection 1.65 mg, 1.5 mL, IntraVENous, ONCE PRN  polyvinyl alcohol (LIQUIFILM TEARS) 1.4 % ophthalmic solution 1 drop, 1 drop, Both Eyes, TID  0.9 % sodium chloride infusion, 1,000 mL, IntraVENous, Continuous    Allergies:  Patient has no known allergies. Social History: Former smoker and quit in 1974 and denies alcohol and is a , resides in assisted living      Family History:   History reviewed. No pertinent family history. REVIEW OF SYSTEMS:   Unable    PHYSICAL EXAM:   /73   Pulse 87   Temp 97.8 °F (36.6 °C) (Oral)   Resp 17   Ht 5' 10\" (1.778 m)   Wt 185 lb (83.9 kg)   SpO2 95%   BMI 26.54 kg/m²   CONST:  Well developed, well nourished who appears of stated age. Awake, alert and cooperative. No apparent distress. HEENT:   Head- Normocephalic, atraumatic   Eyes- Conjunctivae pink, anicteric  Throat- Oral mucosa pink and moist  Neck-  No stridor, trachea midline, no jugular venous distention. No carotid bruit. CHEST: Chest symmetrical and non-tender to palpation. No accessory muscle use or intercostal retractions, site of pacemaker right upper chest unremarkable and well-healed mid sternotomy scar  RESPIRATORY: Lung sounds - clear throughout fields   CARDIOVASCULAR:     Heart Inspection- shows no noted pulsations  Heart Palpation- no heaves or thrills; PMI is non-displaced   Heart Ausculation-irregularly irregular rate and rhythm, no murmur. No s3, s4 or rub   PV: No lower extremity edema. No varicosities. Pedal pulses palpable, no clubbing or cyanosis   ABDOMEN: Soft, non-tender to light palpation. Bowel sounds present. No palpable masses no organomegaly; no abdominal bruit  MS: Good muscle strength and tone. No atrophy or abnormal movements.    : Deferred  SKIN: Warm and dry no statis dermatitis or ulcers   NEURO / PSYCH: Confused and trying to crawl out of bed    DATA:    ECG / Tele strips: Not on telemetry  Diagnostic:      Intake/Output Summary (Last 24 hours) at 7/3/2022 1239  Last data filed at 7/3/2022 4114  Gross per 24 hour   Intake 1782 ml   Output 625 ml   Net 1157 ml       Labs:   CBC:   Recent Labs     07/02/22  0456 07/03/22 0455   WBC 14.4* 14.0*   HGB 9.2* 8.7*   HCT 31.0* 29.6*    277     BMP:   Recent Labs     07/02/22  0456 07/03/22 0455    140   K 3.6 3.8   CO2 26 24   BUN 17 12   CREATININE 1.3* 1.1   LABGLOM 52 >60   CALCIUM 8.3* 8.1*     Mag: No results for input(s): MG in the last 72 hours. Phos: No results for input(s): PHOS in the last 72 hours. TFT: No results found for: TSH, N0SQTVF, J7NMJIW, THYROIDAB, FT3, T4FREE   HgA1c:   Lab Results   Component Value Date    LABA1C 6.8 06/21/2022     No results found for: EAG  proBNP: No results for input(s): PROBNP in the last 72 hours. PT/INR: No results for input(s): PROTIME, INR in the last 72 hours. APTT:No results for input(s): APTT in the last 72 hours. CARDIAC ENZYMES:  Recent Labs     07/01/22  1127 07/02/22 0456   TROPHS 63* 68*     FASTING LIPID PANEL:  Lab Results   Component Value Date/Time    CHOL 69 06/21/2022 12:00 AM    HDL 28 06/21/2022 12:00 AM    LDLCALC 26 06/21/2022 12:00 AM    TRIG 73 06/21/2022 12:00 AM     LIVER PROFILE:  Recent Labs     07/02/22  0456 07/03/22 0455   AST 7 10   ALT <5 <5   LABALBU 3.1* 2.9*       Electronically signed by MONTSERRAT Zepeda CNP on 7/3/2022 at 12:39 PM       Impression and plan by Dr. Patricia Tidwell     I have personally seen and evaluated the patient. I personally obtained the history and performed the physical exam.  I personally reviewed all of the above labs, history, review of systems, and data. All of the assessments and recommendations are from me. All of the above cardiac medical decisions are from me. Please see my additional contributions to the history, physical exam, assessment, and recommendations below.       History of chief complaint:  He was ambulating to use the bedside commode upon my arrival.  He has been having increasing weakness and increasing falls with injuries over the past year or so. He had a traumatic low back hematoma in May 2019. Reportedly has not been eating or drinking well. He now presented with increasing fatigue and altered mental status. He was found to have urinary tract infection. Cardiology is consulted for elevated troponin and reported chest pain. He states that he has had chronic episodes of chest pains for several years. He states that they have not changed. He is not able to tell me if they are associated with physical activities or how long they last.  He is not a good historian. Review of systems:     Further is unobtainable. Physical exam:  /70   Pulse 90   Temp 97.9 °F (36.6 °C) (Oral)   Resp 18   Ht 5' 10\" (1.778 m)   Wt 185 lb (83.9 kg)   SpO2 96%   BMI 26.54 kg/m²   Constitutional: A&O x 2-3, communicates well, no acute distress. Eyes: extraocular muscles intact, PERRL. Normal lids & conjunctiva. No icterus. ENT: clear, no bleeding. No external masses. Lips normal formation. Neck: supple, full ROM, no JVD, no bruits, no lymphadenopathy. No masses. trachea midline. Heart: Distant, irregular rate & rhythm, normal S1 & S2, no abnormal murmurs. No heave. Lungs: CTA. No accessory muscles. Decreased air movement. Abd: soft, non-tender. Normal bowel sounds. Neuro: Full ROM X 4, EOMI, no tremors. EXT: No bilateral lower extremity edema  Skin: warm, dry, intact. Good turgor. Psych: A&O x 2-3, normal behavior, not anxious. Patient seen and examined. Chart, labs & data reviewed. A:  1. Permanent atrial fibrillation. Heart rate controlled. On chronic Eliquis therapy. 2. Increasing weakness, fatigue, altered mental status and increasingly frequent falls with significant trauma. 3. Urinary tract infection  4. Altered mental status beyond his baseline.   Possibly due to the urinary tract infection. 5. Coronary artery disease. 6. Chronically elevated troponins. Unchanged. 7. Pacemaker  8. Oxygen requiring COPD  9. Prior craniotomy for subdural hematoma  10. Chronic anemia. 11. Abdominal aortic aneurysm repair  12. Diabetes  13. COVID 12-21  14. Mesothelioma      Rec:  1. DNR-CC  2. With his increasing frailty, increasing weakness, and increasing traumatic falls it does not appear that he is a good candidate to remain on anticoagulation. I recommend that this is discontinued. 3. Continue heart rate control. 4. No further cardiac testing is planned. 5. Cardiology will sign off. Electronically signed by Santiago Platt DO on 7/3/2022 at 5:29 PM    Note: This report was completed using computerized voice recognition software. Every effort has been made to ensure accuracy, however; and invert and computerized transcription errors may be present.

## 2022-07-03 NOTE — ACP (ADVANCE CARE PLANNING)
Advance Care Planning     Advance Care Planning Activator (Inpatient)  Conversation Note      Date of ACP Conversation: 7/3/2022   Conversation Conducted with:  Healthcare Decision Maker: Named in Advance Directive or Healthcare Power of  (name) son, Ellie Carvajal  ACP Activator: Deangelo Mitchell MD    Hospital diagnoses warranting ACP:  Principal Problem:    UTI (urinary tract infection)  Active Problems:    Altered mental status  Resolved Problems:    * No resolved hospital problems. *        Health Care Decision Maker:   Current Designated Health Care Decision Maker:     Primary Decision Maker: Terrence Joshi Child - [phone]    Secondary Decision Maker: Tonio Royal Child - [phone]      Care Preferences    Ventilation: \"If you were in your present state of health and suddenly became very ill and were unable to breathe on your own, what would your preference be about the use of a ventilator (breathing machine) if it were available to you? \"      Would the patient desire the use of ventilator (breathing machine)?: no    \"If your health worsens and it becomes clear that your chance of recovery is unlikely, what would your preference be about the use of a ventilator (breathing machine) if it were available to you? \"     Would the patient desire the use of ventilator (breathing machine)?: No      Resuscitation  \"CPR works best to restart the heart when there is a sudden event, like a heart attack, in someone who is otherwise healthy. Unfortunately, CPR does not typically restart the heart for people who have serious health conditions or who are very sick. \"    \"In the event your heart stopped as a result of an underlying serious health condition, would you want attempts to be made to restart your heart (answer \"yes\" for attempt to resuscitate) or would you prefer a natural death (answer \"no\" for do not attempt to resuscitate)? \" no       [] Yes   [] No   Educated Patient / Decision Maker regarding differences between Advance Directives and portable DNR orders. Conversation Outcomes:  [] ACP discussion completed  [] Existing advance directive reviewed with patient; no changes to patient's previously recorded wishes  [] New Advance Directive completed  [] Portable Do Not Rescitate prepared for Provider review and signature  [] POLST/POST/MOLST/MOST prepared for Provider review and signature    Details of ACP discussion:  Spoke at length with patient's son regarding his poor intake and recent weight loss and suspected dementia. CT head finding suggestive vascular dementia. Son reports that he think his dad would want to transition to hospice and he would like to focus on patient's comfort only. He would need to be at a facility as there is not 24/7 support available at home to facilitate. Hospice consult placed. Orders adjusted.     Length of ACP Conversation in minutes: 18   Code status following completion of discussion: DNR-CC     Follow-up plan:    [] Schedule follow-up conversation to continue planning  [] Referred individual to Provider for additional questions/concerns   [] Advised patient/agent/surrogate to review completed ACP document and update if needed with changes in condition, patient preferences or care setting  [] This note routed to one or more involved healthcare providers  [] None    Electronically signed by Laura Fuentes MD on 7/3/2022 at 3:26 PM

## 2022-07-03 NOTE — PROGRESS NOTES
IntraVENous 2 times per day    aspirin  81 mg Oral Daily    polyvinyl alcohol  1 drop Both Eyes TID    cefTRIAXone (ROCEPHIN) IV  1,000 mg IntraVENous Q24H     PRN Meds: melatonin, sodium chloride flush, sodium chloride, promethazine **OR** ondansetron, polyethylene glycol, acetaminophen **OR** acetaminophen, perflutren lipid microspheres    I/O    Intake/Output Summary (Last 24 hours) at 7/3/2022 1133  Last data filed at 7/3/2022 0658  Gross per 24 hour   Intake 1782 ml   Output 775 ml   Net 1007 ml       Labs:   Recent Labs     07/01/22 1127 07/02/22 0456 07/03/22  0455   WBC 14.1* 14.4* 14.0*   HGB 9.3* 9.2* 8.7*   HCT 31.1* 31.0* 29.6*    290 277       Recent Labs     07/01/22  1127 07/02/22  0456 07/03/22  0455    138 140   K 3.6 3.6 3.8    98 103   CO2 29 26 24   BUN 20 17 12   CREATININE 1.4* 1.3* 1.1   CALCIUM 8.9 8.3* 8.1*       Recent Labs     07/01/22  1127 07/02/22  0456 07/03/22  0455   PROT 6.7 6.5 6.4   ALKPHOS 81 75 71   ALT <5 <5 <5   AST 7 7 10   BILITOT 0.5 0.4 0.5       No results for input(s): INR in the last 72 hours. No results for input(s): Hilary Kras in the last 72 hours. Chronic labs:  Lab Results   Component Value Date    CHOL 69 06/21/2022    TRIG 73 06/21/2022    HDL 28 (A) 06/21/2022    LDLCALC 26 06/21/2022    INR 1.6 10/08/2021    LABA1C 6.8 06/21/2022       Radiology:  Imaging studies reviewed today. ASSESSMENT:  Acute metabolic encephalopathy  Dehydration   Atrial fibrillation  Elevated troponin and recent chest pain   COPD  DM2  HTN  HLD  CKD    PLAN:  Spoke at length with son who is dpoa and he would like to pursue hospice - they have been consulted. Refer to ACP note   Clarify code status - DNR-CC      Diet: ADULT DIET;  Regular  Code Status: DNR-CCA  PT/OT Eval Status:   ordered  DVT Prophylaxis:   eliquis  Recommended disposition at discharge:  hospice     +++++++++++++++++++++++++++++++++++++++++++++++++  Chelsey Ward MD   [facility] Laine Kuo.  +++++++++++++++++++++++++++++++++++++++++++++++++  NOTE: This report was transcribed using voice recognition software. Every effort was made to ensure accuracy; however, inadvertent computerized transcription errors may be present.

## 2022-07-03 NOTE — PLAN OF CARE
Problem: Safety - Adult  Goal: Free from fall injury  7/2/2022 2319 by Galdino Petty RN  Outcome: Progressing  Flowsheets  Taken 7/2/2022 2317 by Galdino Petty RN  Free From Fall Injury: Instruct family/caregiver on patient safety  Taken 7/2/2022 1129 by Jacquelyn Mora RN  Free From Fall Injury: Instruct family/caregiver on patient safety  7/2/2022 1128 by Jacquelyn Mora RN  Outcome: Progressing     Problem: ABCDS Injury Assessment  Goal: Absence of physical injury  7/2/2022 2319 by Galdino Petty RN  Outcome: Progressing  Flowsheets  Taken 7/2/2022 2317 by Galdino Petty RN  Absence of Physical Injury: Implement safety measures based on patient assessment  Taken 7/2/2022 1129 by Jacquelyn Mora RN  Absence of Physical Injury: Implement safety measures based on patient assessment  7/2/2022 1128 by Jacquelyn Mora RN  Outcome: Progressing     Problem: Skin/Tissue Integrity  Goal: Absence of new skin breakdown  Description: 1. Monitor for areas of redness and/or skin breakdown  2. Assess vascular access sites hourly  3. Every 4-6 hours minimum:  Change oxygen saturation probe site  4. Every 4-6 hours:  If on nasal continuous positive airway pressure, respiratory therapy assess nares and determine need for appliance change or resting period.   7/2/2022 2319 by Galdino Petty RN  Outcome: Progressing  7/2/2022 1128 by Jacquelyn Mora RN  Outcome: Progressing

## 2022-07-03 NOTE — PROGRESS NOTES
Family chose hospice of the [facility] consult called awaiting nurses call back

## 2022-07-04 NOTE — PROGRESS NOTES
Hospitalist Progress Note      Synopsis: Patient with a hx of atrial fibrillation, CAD, COPD, DM2, HTN, depression, pulmonary hypertension admitted on 7/1/2022 after presenting from nursing home for increasing confusion in setting of newly diagnosed UTI. Pt given rocephin and admitted for further evaluation and treatment. Urine culture ultimately negative. In discussions with patient's son he would like to transition patient to hospice. Hospice has been consulted. Pt will need placement     Subjective  Confusion improved  Reports he has had intermittent recent chest pain   Afebrile overnight   On 2L oxygen    Exam:  /71   Pulse 93   Temp 97.3 °F (36.3 °C) (Temporal)   Resp 16   Ht 5' 10\" (1.778 m)   Wt 185 lb (83.9 kg)   SpO2 95%   BMI 26.54 kg/m²   General appearance: No apparent distress, appears stated age and cooperative. HEENT: Pupils equal, round, and reactive to light. Conjunctivae/corneas clear. Neck: Supple. No jugular venous distention. Trachea midline. Respiratory:  Normal respiratory effort. Clear to auscultation, bilaterally without Rales/Wheezes/Rhonchi. Cardiovascular: Irregularly irregular with normal S1/S2 without murmurs, rubs or gallops. Abdomen: Soft, non-tender, non-distended with normal bowel sounds. Musculoskeletal: No clubbing, cyanosis or edema bilaterally. Brisk capillary refill. 2+ lower extremity pulses (dorsalis pedis).    Skin:  No rashes    Neurologic: awake, alert and following commands but confused    Medications:  Reviewed    Infusion Medications    sodium chloride      sodium chloride 1,000 mL (07/01/22 1654)     Scheduled Medications    DULoxetine  30 mg Oral Daily    fluticasone  1 spray Each Nostril Daily    cetirizine  10 mg Oral Daily    [Held by provider] metFORMIN  500 mg Oral BID WC    metoprolol  50 mg Oral BID    mirtazapine  7.5 mg Oral Nightly    pantoprazole  40 mg Oral QAM AC    tamsulosin  0.4 mg Oral Daily    sodium chloride flush 10 mL IntraVENous 2 times per day    polyvinyl alcohol  1 drop Both Eyes TID     PRN Meds: melatonin, sodium chloride flush, sodium chloride, promethazine **OR** ondansetron, polyethylene glycol, acetaminophen **OR** acetaminophen, perflutren lipid microspheres    I/O  No intake or output data in the 24 hours ending 07/04/22 1046    Labs:   Recent Labs     07/02/22 0456 07/03/22 0455 07/04/22 0718   WBC 14.4* 14.0* 12.4*   HGB 9.2* 8.7* 8.4*   HCT 31.0* 29.6* 27.9*    277 297       Recent Labs     07/02/22 0456 07/03/22 0455 07/04/22 0718    140 138   K 3.6 3.8 4.0   CL 98 103 103   CO2 26 24 25   BUN 17 12 10   CREATININE 1.3* 1.1 1.0   CALCIUM 8.3* 8.1* 8.4*       Recent Labs     07/02/22 0456 07/03/22 0455 07/04/22 0718   PROT 6.5 6.4 6.2*   ALKPHOS 75 71 71   ALT <5 <5 <5   AST 7 10 13   BILITOT 0.4 0.5 0.5       No results for input(s): INR in the last 72 hours. No results for input(s): Sammi Dross in the last 72 hours. Chronic labs:  Lab Results   Component Value Date    CHOL 69 06/21/2022    TRIG 73 06/21/2022    HDL 28 (A) 06/21/2022    LDLCALC 26 06/21/2022    INR 1.6 10/08/2021    LABA1C 6.8 06/21/2022       Radiology:  Imaging studies reviewed today. ASSESSMENT:  Acute metabolic encephalopathy  Dehydration   Atrial fibrillation  Elevated troponin and recent chest pain   COPD  DM2  HTN  HLD  CKD    PLAN:  Spoke at length with son who is dpoa and he would like to pursue hospice - they have been consulted. Refer to ACP note   Comfort measures only   DNR-CC      Diet: ADULT DIET; Regular  Code Status: DNR-CC  PT/OT Eval Status:   ordered  DVT Prophylaxis:   scd  Recommended disposition at discharge:  hospice     +++++++++++++++++++++++++++++++++++++++++++++++++  Alicia Harvey MD   [facility].  +++++++++++++++++++++++++++++++++++++++++++++++++  NOTE: This report was transcribed using voice recognition software.  Every effort was made to ensure accuracy; however, inadvertent computerized transcription errors may be present.

## 2022-07-04 NOTE — PROGRESS NOTES
Referral received. Chart reviewed. Call placed to patient's son [place], [place] [facility] at [phone]. No answer. Left message. Await return call. Visit made to room. No family present. Sitter in the room. Patient is sitting in a chair. Pleasantly confused. Will await call back from son. Spoke with charge nurse [facility] and provided update.

## 2022-07-04 NOTE — PLAN OF CARE
Problem: Safety - Adult  Goal: Free from fall injury  Outcome: Progressing     Problem: ABCDS Injury Assessment  Goal: Absence of physical injury  Outcome: Progressing  Flowsheets (Taken 7/3/2022 1958 by Mona Marinelli RN)  Absence of Physical Injury: Implement safety measures based on patient assessment     Problem: Skin/Tissue Integrity  Goal: Absence of new skin breakdown  Description: 1. Monitor for areas of redness and/or skin breakdown  2. Assess vascular access sites hourly  3. Every 4-6 hours minimum:  Change oxygen saturation probe site  4. Every 4-6 hours:  If on nasal continuous positive airway pressure, respiratory therapy assess nares and determine need for appliance change or resting period.   Outcome: Progressing

## 2022-07-05 NOTE — PLAN OF CARE
Problem: Safety - Adult  Goal: Free from fall injury  Outcome: Progressing     Problem: ABCDS Injury Assessment  Goal: Absence of physical injury  Outcome: Progressing     Problem: Skin/Tissue Integrity  Goal: Absence of new skin breakdown  Description: 1. Monitor for areas of redness and/or skin breakdown  2. Assess vascular access sites hourly  3. Every 4-6 hours minimum:  Change oxygen saturation probe site  4. Every 4-6 hours:  If on nasal continuous positive airway pressure, respiratory therapy assess nares and determine need for appliance change or resting period.   Outcome: Progressing     Problem: Chronic Conditions and Co-morbidities  Goal: Patient's chronic conditions and co-morbidity symptoms are monitored and maintained or improved  Outcome: Progressing     Problem: Pain  Goal: Verbalizes/displays adequate comfort level or baseline comfort level  Outcome: Progressing

## 2022-07-05 NOTE — PROGRESS NOTES
Hospitalist Progress Note      Synopsis: Patient with a hx of atrial fibrillation, CAD, COPD, DM2, HTN, depression, pulmonary hypertension admitted on 7/1/2022 after presenting from nursing home for increasing confusion in setting of newly diagnosed UTI. Pt given rocephin and admitted for further evaluation and treatment. Urine culture ultimately negative. In discussions with patient's son he would like to transition patient to hospice. Hospice has been consulted. Pt will need placement     Subjective    Patient seen and examined at bedside. Sitter at bedside as well. Patient not good historian. Denies any acute pain, shortness of breath, abdominal pain, nausea or vomiting. No acute overnight event noted. Exam:  BP (!) 148/89   Pulse 75   Temp 97.7 °F (36.5 °C) (Temporal)   Resp 18   Ht 5' 10\" (1.778 m)   Wt 185 lb (83.9 kg)   SpO2 95%   BMI 26.54 kg/m²   General appearance: No apparent distress, appears stated age and cooperative. Erleen Bagley Respiratory: Bilateral air entry fair. No effusion or crackles. Cardiovascular: Irregularly irregular with normal S1/S2 without murmurs, rubs or gallops. Abdomen: Soft, non-tender, non-distended with normal bowel sounds.   Musculoskeletal: No peripheral edema  Neurologic: awake, alert and following commands but confused    Medications:  Reviewed    Infusion Medications    sodium chloride      sodium chloride 1,000 mL (07/01/22 2237)     Scheduled Medications    DULoxetine  30 mg Oral Daily    fluticasone  1 spray Each Nostril Daily    cetirizine  10 mg Oral Daily    [Held by provider] metFORMIN  500 mg Oral BID WC    metoprolol  50 mg Oral BID    mirtazapine  7.5 mg Oral Nightly    pantoprazole  40 mg Oral QAM AC    tamsulosin  0.4 mg Oral Daily    sodium chloride flush  10 mL IntraVENous 2 times per day    polyvinyl alcohol  1 drop Both Eyes TID     PRN Meds: potassium chloride **OR** potassium alternative oral replacement **OR** potassium chloride, melatonin, sodium chloride flush, sodium chloride, promethazine **OR** ondansetron, polyethylene glycol, acetaminophen **OR** acetaminophen, perflutren lipid microspheres    I/O    Intake/Output Summary (Last 24 hours) at 7/5/2022 1428  Last data filed at 7/5/2022 0630  Gross per 24 hour   Intake 300 ml   Output 425 ml   Net -125 ml       Labs:   Recent Labs     07/03/22 0455 07/04/22 0718 07/05/22 0437   WBC 14.0* 12.4* 13.0*   HGB 8.7* 8.4* 8.6*   HCT 29.6* 27.9* 28.7*    297 300       Recent Labs     07/03/22 0455 07/04/22 0718 07/05/22 0437    138 138   K 3.8 4.0 3.4*    103 100   CO2 24 25 22   BUN 12 10 10   CREATININE 1.1 1.0 1.2   CALCIUM 8.1* 8.4* 8.5*       Recent Labs     07/03/22 0455 07/04/22 0718 07/05/22 0437   PROT 6.4 6.2* 6.3*   ALKPHOS 71 71 71   ALT <5 <5 <5   AST 10 13 10   BILITOT 0.5 0.5 0.6       No results for input(s): INR in the last 72 hours. No results for input(s): Alondra Belfast in the last 72 hours. Chronic labs:  Lab Results   Component Value Date    CHOL 69 06/21/2022    TRIG 73 06/21/2022    HDL 28 (A) 06/21/2022    LDLCALC 26 06/21/2022    INR 1.6 10/08/2021    LABA1C 6.8 06/21/2022       Radiology:  Imaging studies reviewed today. ASSESSMENT:  Acute metabolic encephalopathy  Atrial fibrillation-rate controlled  Elevated troponin, unlikely ACS  COPD  DM2  HTN  HLD  CKD  Hypokalemia    PLAN:    Hospice evaluation appreciated. Awaiting family's decision. Continue present management with Lopressor twice daily/Remeron/Protonix/Flomax/Cymbalta/cetirizine/Flonase  Continue IV hydration. Potassium supplemented. DVT prophylaxis SCD      Diet: ADULT DIET;  Regular  Code Status: DNR-CC  PT/OT Eval Status:   ordered  DVT Prophylaxis:   scd  Recommended disposition at discharge:  hospice     +++++++++++++++++++++++++++++++++++++++++++++++++  Laverne Van MD   [facility].  +++++++++++++++++++++++++++++++++++++++++++++++++  NOTE: This report was transcribed using voice recognition software. Every effort was made to ensure accuracy; however, inadvertent computerized transcription errors may be present.

## 2022-07-05 NOTE — PROGRESS NOTES
Call to patient's son Hunter Hoang at [phone], and [phone], no answer, unable to leave a message. We will continue to try, will call other family listed.   Thank you,  Ethan Clifton -[phone]

## 2022-07-05 NOTE — CARE COORDINATION
Patient with a past medical history of Atherosclerotic heart disease, Atrial fibrillation ([address] CAD (coronary artery disease), COPD (chronic obstructive pulmonary disease) ([address] Diabetes mellitus ([address] Hearing difficulty, Hyperlipidemia, Hypertension, Major depressive disorder, Pulmonary hypertension ([address] Skin ulcer of back with fat layer exposed ([address] and Traumatic hematoma of lower back is admitted with Altered mental status and Urinary tract infection. Per internal med note today, In discussions with patient's son he would like to transition patient to hospice. Hospice has been consulted. Per [facility]al[facility] from [facility] note today, Kirk Stacy stated he will need to discuss hospice care with the rest of the family. Offered to set up a meeting. Kirk Stacy declined. Kirk Stacy stated he will reach out to [facility] where patient is from and see if patient can return once discharged. Kirk Stacy stated he will contact [facility] tomorrow with a family decision.   Shaggy Dias RN   [phone]

## 2022-07-05 NOTE — PROGRESS NOTES
Call placed to son Chuckie Kussmaul. Explained hospice philosophy and no longer pursuing any further aggressive treatment. Focusing on comfort care only. Explained hospice benefit and reviewed all levels of care including the hospice house for short term symptom management. Once symptoms are managed, patient would transfer to a routine level of care either home or ECF with hospice. Discussed room and board would be private pay at the facility. Discussed roles and frequency visits of skilled nursing, personal care team and Damir Rivers stated he will need to discuss hospice care with the rest of the family. Offered to set up a meeting. Chuckie Kussmaul declined. Bob Anayaanilazev stated he will reach out to [facility] and see if patient can return once discharged. Bob Kussmaul stated he will contact \[facility]\"" tomorrow with a family decision.

## 2022-07-06 NOTE — PROGRESS NOTES
Physical Therapy  Physical Therapy Initial Assessment       Name: Siria Palm  : 1931  MRN: [id number]      Date of Service: 2022    Evaluating PT:  Peter Lerma PT, DPT  [id number]    Room #:  7489/6088-K  Diagnosis:  UTI (urinary tract infection) [N39.0]  Acute cystitis without hematuria [N30.00]  Altered mental status, unspecified altered mental status type [R41.82]  PMHx/PSHx:  Afib, CAD, COPD, DM, [place], MDD, pHTN    Procedure/Surgery:    Precautions:  Falls, Alarm, TSM  Equipment Needs:      SUBJECTIVE:    Pt admitted from SNF. Pt states he is ambulatory using Rollator. Pt is a questionable historian. Equipment Owned: Rollator    OBJECTIVE:   Initial Evaluation  Date: 22 Treatment Short Term/ Long Term   Goals   AM-PAC 6 Clicks 87/75     Was pt agreeable to Eval/treatment? yes     Does pt have pain? No c/o pain     Bed Mobility  Rolling: Luis  Supine to sit: Luis  Sit to supine: Luis  Scooting: Luis  Rolling: Independent    Supine to sit: Independent    Sit to supine: Independent    Scooting: Independent     Transfers Sit to stand: Luis  Stand to sit: Luis  Stand pivot: Luis [employer]  Sit to stand: supervision  Stand to sit: supervision  Stand pivot: supervision   Ambulation    3 feet, 3 feet Luis [employer]  >150 feet with supervision   Stair negotiation: ascended and descended  NT  >4 steps with single rail supervision   ROM BUE:  Defer to OT  BLE:  WFL     Strength BUE:  Defer to OT  BLE:  4/5  Improve 1 MMT   Balance Sitting EOB:  SBA  Dynamic Standing:  Luis [employer]  Sitting EOB:  Independent    Dynamic Standing:  supervision     Pt is A & O x 3. Cognitive deficits noted with following 2 step instrution.   Sensation:  WNL  Edema:  WNl      Patient education  Pt educated on role of PT    Patient response to education:   Pt verbalized understanding Pt demonstrated skill Pt requires further education in this area   x partial x     ASSESSMENT:    Conditions Requiring Skilled Therapeutic Intervention:    [x]Decreased strength     [x]Decreased ROM  [x]Decreased functional mobility  [x]Decreased balance   [x]Decreased endurance   []Decreased posture  []Decreased sensation  []Decreased coordination   []Decreased vision  [x]Decreased safety awareness   [x]Increased pain       Comments:  Pt agreeable to PT evaluation. Pt performing bed mobility, functional transfers, and ambulation with verbal cues and assistance. Pt ambulation pattern mildly unsteady requiring assist throughout. Patient would benefit from continued skilled PT to maximize functional mobility independence. Bed alarm activated post session    Treatment:  Patient practiced and was instructed in the following treatment:     Bed mobility- verbal cues to facilitate independence   Functional transfers-Verbal cues for proper positioning and sequencing to perform transfers safely with maximum independence.  Gait training-Verbal cues for proper positioning and sequencing using assistive device to maximize functional mobility independence. Pt's/ family goals   1. Get better    Prognosis is good for reaching above PT goals. Patient and or family understand(s) diagnosis, prognosis, and plan of care.   yes    PHYSICAL THERAPY PLAN OF CARE:    PT POC is established based on physician order and patient diagnosis     Referring provider/PT Order:    07/06/22 1200  PT eval and treat  Start:  07/06/22 1200,   End:  07/06/22 1200,   ONE TIME,   Standing Count:  1 Occurrences,   R               Diagnosis:  UTI (urinary tract infection) [N39.0]  Acute cystitis without hematuria [N30.00]  Altered mental status, unspecified altered mental status type [R41.82]  Specific instructions for next treatment:  Increase ambulation distance, continue transfer training safety    Current Treatment Recommendations:     [x] Strengthening to improve independence with functional mobility   [x] ROM to improve independence with functional mobility   [x] Balance Training to improve static/dynamic balance and to reduce fall risk  [x] Endurance Training to improve activity tolerance during functional mobility   [x] Transfer Training to improve safety and independence with all functional transfers   [x] Gait Training to improve gait mechanics, endurance and assess need for appropriate assistive device  [x] Stair Training in preparation for safe discharge home and/or into the community   [x] Positioning to prevent skin breakdown and contractures  [x] Safety and Education Training   [x] Patient/Caregiver Education   [x] HEP  [] Other     PT long term treatment goals are located in above grid    Frequency of treatments: 2-5x/week x 1-2 weeks. Time in  1415  Time out  1431    Total Treatment Time  8 minutes     Evaluation Time includes thorough review of current medical information, gathering information on past medical history/social history and prior level of function, completion of standardized testing/informal observation of tasks, assessment of data and education on plan of care and goals.     CPT codes:  [x] Low Complexity PT evaluation 39958  [] Moderate Complexity PT evaluation 83315  [] High Complexity PT evaluation 36846  [] PT Re-evaluation 06220  [] Gait training 70286 0 minutes  [] Manual therapy 78076 0 minutes  [x] Therapeutic activities 31280 8 minutes  [] Therapeutic exercises 50752 0 minutes  [] Neuromuscular reeducation 34614 0 minutes       Elli Jacobsen PT, DPT   [id number]

## 2022-07-06 NOTE — PLAN OF CARE
Problem: Safety - Adult  Goal: Free from fall injury  7/5/2022 2347 by Celeste Mayen RN  Outcome: Progressing  Flowsheets (Taken 7/5/2022 2345)  Free From Fall Injury: Instruct family/caregiver on patient safety  7/5/2022 1746 by Heena Mcpherson RN  Outcome: Progressing     Problem: ABCDS Injury Assessment  Goal: Absence of physical injury  7/5/2022 2347 by Celeste Mayen RN  Outcome: Progressing  Flowsheets (Taken 7/5/2022 2345)  Absence of Physical Injury: Implement safety measures based on patient assessment  7/5/2022 1746 by Heena Mcpherson RN  Outcome: Progressing     Problem: Skin/Tissue Integrity  Goal: Absence of new skin breakdown  Description: 1. Monitor for areas of redness and/or skin breakdown  2. Assess vascular access sites hourly  3. Every 4-6 hours minimum:  Change oxygen saturation probe site  4. Every 4-6 hours:  If on nasal continuous positive airway pressure, respiratory therapy assess nares and determine need for appliance change or resting period.   7/5/2022 2347 by Celeste Mayen RN  Outcome: Progressing  7/5/2022 1746 by Heena Mcpherson RN  Outcome: Progressing     Problem: Chronic Conditions and Co-morbidities  Goal: Patient's chronic conditions and co-morbidity symptoms are monitored and maintained or improved  7/5/2022 1746 by Heena Mcpherson RN  Outcome: Progressing     Problem: Pain  Goal: Verbalizes/displays adequate comfort level or baseline comfort level  7/5/2022 1746 by Heena Mcpherson RN  Outcome: Progressing

## 2022-07-06 NOTE — PROGRESS NOTES
[address]                                                  Patient Name: Rakesh Kendrick    MRN: [id number]    : 1931    Room: [address] [place] [address]          Evaluating OT: Mariama Burdick OTR/L; [id number]       Referring Provider: Bryan Reyez MD    Specific Provider Orders/Date: OT Eval and Treat 22      Diagnosis: UTI (urinary tract infection); Altered mental status    Surgery: None this admission    Pertinent Medical History:  has a past medical history of Atherosclerotic heart disease, Atrial fibrillation ([facility] CAD (coronary artery disease), COPD (chronic obstructive pulmonary disease) ([facility] Diabetes mellitus ([facility] Hearing difficulty, Hyperlipidemia, Hypertension, Major depressive disorder, Pulmonary hypertension ([facility] Skin ulcer of back with fat layer exposed ([facility] and Traumatic hematoma of lower back.     Recommended Adaptive Equipment: TBD     Precautions:  Fall Risk, cognition, +alarms, TSM      Assessment of current deficits    [x] Functional mobility  [x]ADLs  [x] Strength               [x]Cognition    [x] Functional transfers   [x] IADLs         [x] Safety Awareness   [x]Endurance    [] Fine Coordination              [x] Balance      [] Vision/perception   []Sensation     []Gross Motor Coordination  [] ROM  [] Delirium                   [] Motor Control     OT PLAN OF CARE   OT POC based on physician orders, patient diagnosis and results of clinical assessment    Frequency/Duration 1-3 days/wk for 2 weeks PRN   Specific OT Treatment Interventions to include:   * Instruction/training on adapted ADL techniques and AE recommendations to increase functional independence within precautions       * Training on energy conservation strategies, correct breathing pattern and techniques to improve independence/tolerance for self-care routine  * Functional transfer/mobility training/DME recommendations for increased independence, safety, and fall prevention  * Patient/Family education to increase follow through with safety techniques and functional independence  * Recommendation of environmental modifications for increased safety with functional transfers/mobility and ADLs  * Cognitive retraining/development of therapeutic activities to improve problem solving, judgement, memory, and attention for increased safety/participation in ADL/IADL tasks  * Therapeutic exercise to improve motor endurance, ROM, and functional strength for ADLs/functional transfers  * Therapeutic activities to facilitate/challenge dynamic balance, stand tolerance for increased safety and independence with ADLs  * Positioning to improve skin integrity, interaction with environment and functional independence    Pt is questionable historian. Home Living: Pt reports being admitted from SNF. Bathroom setup: Handicap accessible  Equipment owned: rollator    Prior Level of Function: mod I with ADLs , mod I with IADLs; engaged in functional mobility with use of  rollator  Occupation: None reported    Pain Level: Pt with no c/o pain throughout session  Cognition: A&O: 1/4; Follows 1-2 step directions   Memory:  Fair-   Sequencing:  Fair-   Problem solving:  Fair-   Judgement/safety:  Poor     Functional Assessment:  AM-PAC Daily Activity Raw Score: 15/24   Initial Eval Status  Date: 7/6/22 Treatment Status  Date: STGs = LTGs  Time frame: 10-14 days   Feeding Stand by Assist   Spillage noted on gown & floor upon arrival.  Independent    Grooming Minimal Assist   Assist to maintain dynamic standing balance.   Modified Nicholas    UB Dressing Minimal Assist   Modified Nicholas    LB Dressing Maximal Assist   Modified Nicholas    Bathing Moderate Assist  Modified Nicholas   Toileting Maximal Assist   Modified Nicholas   Bed Mobility  Supine to sit: Minimal Assist   Sit to prevention strategies & safe transfer progressions to promote safety awareness & functional engagement throughout daily activities. Pt would benefit from continued skilled OT to increase safety and independence with completion of ADL/IADL tasks for functional independence and quality of life. Treatment: OT treatment provided this date includes:    ADL-  Instruction/training on safety and adapted techniques for completion of ADLs: Pt required assist to tie gown seated EOB. Pt instructed on figure-4 technique to don/doff socks seated in bedside chair with assist to manage over BLEs. Pt required assist/cuing to implement fall prevention strategies & safety awareness throughout ADLs.   Mobility-  Instruction/training on safety and improved independence with bed mobility/functional transfers and functional mobility. Pt required use of ww to maintain dynamic standing balance throughout session. Pt required assist/cuing for proper hand placement & ww management/safety throughout functional transfers/mobility.   Sitting EOB ~5 minutes to improve dynamic sitting balance and activity tolerance during ADLs.   Skilled positioning/alignment-  Proper Positioning/Alignment. Pt required assist/cuing to maintain proper body mechanics throughout session to promote skin/joint integrity, safety awareness, & implementation of fall prevention strategies throughout functional tasks. Rehab Potential: Good for established goals     LTG: maximize independence with ADLs to return to PLOF    Patient and/or family were instructed on functional diagnosis, prognosis/goals and OT plan of care. Demonstrated fair- understanding.        Eval Complexity: Low  · History: Expanded chart review of medical records and additional review of physical, cognitive, or psychosocial history related to current functional performance  · Exam: 3+ performance deficits  · Assistance/Modification: Min/mod assistance or modifications required to perform tasks. May have comorbidities that affect occupational performance. Evaluation time includes thorough review of current medical information, gathering information on past medical & social history & PLOF, completion of standardized testing, informal observation of tasks, consultation with other medical professions/disciplines, assessment of data & development of POC/goals. Time In: 2:12p  Time Out: 2:35p  Total Treatment Time: 8 minutes    Min Units   OT Eval Low 43984  x     OT Eval Medium 65273      OT Eval High 68467      OT Re-Eval [id number]       Therapeutic Ex 39957       Therapeutic Activities 07717       ADL/Self Care 75918  8 1    Orthotic Management 46004       Manual 00531     Neuro Re-Ed 41028       Non-Billable Time          Evaluation Time additionally includes thorough review of current medical information, gathering information on past medical history/social history and prior level of function, interpretation of standardized testing/informal observation of tasks, assessment of data and development of plan of care and goals.               Rhina Boone OTR/L; [id number]

## 2022-07-06 NOTE — PROGRESS NOTES
Hospitalist Progress Note      Synopsis: Patient with a hx of atrial fibrillation, CAD, COPD, DM2, HTN, depression, pulmonary hypertension admitted on 7/1/2022 after presenting from nursing home for increasing confusion in setting of newly diagnosed UTI. Pt given rocephin and admitted for further evaluation and treatment. Urine culture ultimately negative. In discussions with patient's son he would like to transition patient to hospice. Hospice was initially consulted however patient's son later requested skilled rehab placement and then subsequently plan transition to hospice. Subjective    Patient seen and examined at bedside. Sitter at bedside as well. Patient appears more alert and awake today. .  Denies any acute pain, shortness of breath, abdominal pain, nausea or vomiting. No acute overnight event noted. Exam:  /83   Pulse 93   Temp 97.8 °F (36.6 °C)   Resp 18   Ht 5' 10\" (1.778 m)   Wt 185 lb (83.9 kg)   SpO2 94%   BMI 26.54 kg/m²   General appearance: No apparent distress, appears stated age and cooperative. Estil Ramirez Respiratory: Bilateral air entry fair. No effusion or crackles. Cardiovascular: Irregularly irregular with normal S1/S2 without murmurs, rubs or gallops. Abdomen: Soft, non-tender, non-distended with normal bowel sounds. Musculoskeletal: No peripheral edema  Neurologic: awake, alert and following commands AO x2.   Medications:  Reviewed    Infusion Medications    sodium chloride      sodium chloride 1,000 mL (07/01/22 2237)     Scheduled Medications    DULoxetine  30 mg Oral Daily    fluticasone  1 spray Each Nostril Daily    cetirizine  10 mg Oral Daily    [Held by provider] metFORMIN  500 mg Oral BID WC    metoprolol  50 mg Oral BID    mirtazapine  7.5 mg Oral Nightly    pantoprazole  40 mg Oral QAM AC    tamsulosin  0.4 mg Oral Daily    sodium chloride flush  10 mL IntraVENous 2 times per day    polyvinyl alcohol  1 drop Both Eyes TID     PRN Meds: potassium chloride **OR** potassium alternative oral replacement **OR** potassium chloride, melatonin, sodium chloride flush, sodium chloride, promethazine **OR** ondansetron, polyethylene glycol, acetaminophen **OR** acetaminophen, perflutren lipid microspheres    I/O    Intake/Output Summary (Last 24 hours) at 7/6/2022 1340  Last data filed at 7/6/2022 1145  Gross per 24 hour   Intake 240 ml   Output 375 ml   Net -135 ml       Labs:   Recent Labs     07/04/22 0718 07/05/22 0437   WBC 12.4* 13.0*   HGB 8.4* 8.6*   HCT 27.9* 28.7*    300       Recent Labs     07/04/22 0718 07/05/22 0437    138   K 4.0 3.4*    100   CO2 25 22   BUN 10 10   CREATININE 1.0 1.2   CALCIUM 8.4* 8.5*       Recent Labs     07/04/22 0718 07/05/22 0437   PROT 6.2* 6.3*   ALKPHOS 71 71   ALT <5 <5   AST 13 10   BILITOT 0.5 0.6       No results for input(s): INR in the last 72 hours. No results for input(s): Vito Shackle in the last 72 hours. Chronic labs:  Lab Results   Component Value Date    CHOL 69 06/21/2022    TRIG 73 06/21/2022    HDL 28 (A) 06/21/2022    LDLCALC 26 06/21/2022    INR 1.6 10/08/2021    LABA1C 6.8 06/21/2022       Radiology:  Imaging studies reviewed today. ASSESSMENT:  Acute metabolic encephalopathy  Atrial fibrillation-rate controlled  Elevated troponin, unlikely ACS  COPD  DM2  HTN  HLD  CKD  Hypokalemia    PLAN:    Hospice evaluation appreciated. Patient's son Walter Milian at this time would like to hold off on hospice  PT/OT evaluation  Possible rehab placement  Continue present management with Lopressor twice daily/Remeron/Protonix/Flomax/Cymbalta/cetirizine/Flonase  Continue IV hydration. Potassium supplemented. DVT prophylaxis SCD    Disposition-likely discharge to SNF in 24 hours      Diet: ADULT DIET;  Regular  Code Status: DNR-CC  PT/OT Eval Status:   ordered  DVT Prophylaxis:   scd  Recommended disposition at discharge:   Skilled nursing facility    +++++++++++++++++++++++++++++++++++++++++++++++++  Bobby Matos MD   [facility].  +++++++++++++++++++++++++++++++++++++++++++++++++  NOTE: This report was transcribed using voice recognition software. Every effort was made to ensure accuracy; however, inadvertent computerized transcription errors may be present.

## 2022-07-06 NOTE — PLAN OF CARE
Problem: Safety - Adult  Goal: Free from fall injury  7/6/2022 1106 by Tierra Dc RN  Outcome: Progressing  7/5/2022 2347 by Katiana Glez RN  Outcome: Progressing  Flowsheets (Taken 7/5/2022 2345)  Free From Fall Injury: Jill Alvarado family/caregiver on patient safety     Problem: ABCDS Injury Assessment  Goal: Absence of physical injury  7/6/2022 1106 by Tierra Dc RN  Outcome: Progressing  7/5/2022 2347 by Katiana Glez RN  Outcome: Progressing  Flowsheets (Taken 7/5/2022 2345)  Absence of Physical Injury: Implement safety measures based on patient assessment     Problem: Skin/Tissue Integrity  Goal: Absence of new skin breakdown  Description: 1. Monitor for areas of redness and/or skin breakdown  2. Assess vascular access sites hourly  3. Every 4-6 hours minimum:  Change oxygen saturation probe site  4. Every 4-6 hours:  If on nasal continuous positive airway pressure, respiratory therapy assess nares and determine need for appliance change or resting period.   7/6/2022 1106 by Tierra Dc RN  Outcome: Progressing  7/5/2022 2347 by Katiana Glez RN  Outcome: Progressing     Problem: Chronic Conditions and Co-morbidities  Goal: Patient's chronic conditions and co-morbidity symptoms are monitored and maintained or improved  Outcome: Progressing     Problem: Pain  Goal: Verbalizes/displays adequate comfort level or baseline comfort level  Outcome: Progressing

## 2022-07-07 NOTE — PROGRESS NOTES
Hospitalist Progress Note      Synopsis: Patient with a hx of atrial fibrillation, CAD, COPD, DM2, HTN, depression, pulmonary hypertension admitted on 7/1/2022 after presenting from nursing home for increasing confusion in setting of newly diagnosed UTI. Pt given rocephin and admitted for further evaluation and treatment. Urine culture ultimately negative. In discussions with patient's son he would like to transition patient to hospice. Hospice was initially consulted however patient's son later requested skilled rehab placement and then subsequently plan transition to hospice. Subjective    Patient seen and examined at bedside. Telesitter at bedside as well. Patient appears more alert and awake. Denies any acute pain, shortness of breath, abdominal pain, nausea or vomiting. No acute overnight event noted. Exam:  /70   Pulse (!) 106   Temp 98 °F (36.7 °C) (Oral)   Resp 16   Ht 5' 10\" (1.778 m)   Wt 185 lb (83.9 kg)   SpO2 96%   BMI 26.54 kg/m²   General appearance: No apparent distress, appears stated age and cooperative. Pinky Angles Respiratory: Bilateral air entry fair. No effusion or crackles. Cardiovascular: Irregularly irregular with normal S1/S2 without murmurs, rubs or gallops. Abdomen: Soft, non-tender, non-distended with normal bowel sounds. Musculoskeletal: No peripheral edema  Neurologic: awake, alert and following commands AO x2.   Medications:  Reviewed    Infusion Medications    sodium chloride      sodium chloride 1,000 mL (07/01/22 9397)     Scheduled Medications    DULoxetine  30 mg Oral Daily    fluticasone  1 spray Each Nostril Daily    cetirizine  10 mg Oral Daily    [Held by provider] metFORMIN  500 mg Oral BID WC    metoprolol  50 mg Oral BID    mirtazapine  7.5 mg Oral Nightly    pantoprazole  40 mg Oral QAM AC    tamsulosin  0.4 mg Oral Daily    sodium chloride flush  10 mL IntraVENous 2 times per day    polyvinyl alcohol  1 drop Both Eyes TID     PRN Meds: was made to ensure accuracy; however, inadvertent computerized transcription errors may be present.

## 2022-07-07 NOTE — CARE COORDINATION
Per Leila Moore from [facility], they are starting precert today. Patient is sitter free, he currently has a tele sitter. He will also need a negative Covid-19 test prior to discharge. Hens started and will need to be completed once the discharge order goes in. Will determine mode of transportation closer to discharge.  Rock Ajay HINOJOSA   [phone]

## 2022-07-08 NOTE — PROGRESS NOTES
Hospitalist Progress Note      Synopsis: Patient with a hx of atrial fibrillation, CAD, COPD, DM2, HTN, depression, pulmonary hypertension admitted on 7/1/2022 after presenting from nursing home for increasing confusion in setting of newly diagnosed UTI. Pt given rocephin and admitted for further evaluation and treatment. Urine culture ultimately negative. In discussions with patient's son he would like to transition patient to hospice. Hospice was initially consulted however patient's son later requested skilled rehab placement and then subsequently plan transition to hospice. Subjective    Patient seen and examined at bedside. Telesitter at bedside as well. Patient appears more alert and awake. Denies any acute pain, shortness of breath,nausea or vomiting. No acute overnight event noted. Exam:  BP (!) 144/85   Pulse 86   Temp 97.9 °F (36.6 °C) (Oral)   Resp 20   Ht 5' 10\" (1.778 m)   Wt 185 lb (83.9 kg)   SpO2 94%   BMI 26.54 kg/m²   General appearance: No apparent distress, appears stated age and cooperative. Rafeala Stands Respiratory: Bilateral air entry fair. No effusion or crackles. Cardiovascular: Irregularly irregular with normal S1/S2 without murmurs, rubs or gallops. Abdomen: Soft, non-tender, non-distended with normal bowel sounds. Musculoskeletal: No peripheral edema  Neurologic: awake, alert and following commands AO x2.   Medications:  Reviewed    Infusion Medications    sodium chloride      sodium chloride 1,000 mL (07/01/22 5497)     Scheduled Medications    DULoxetine  30 mg Oral Daily    fluticasone  1 spray Each Nostril Daily    cetirizine  10 mg Oral Daily    [Held by provider] metFORMIN  500 mg Oral BID WC    metoprolol  50 mg Oral BID    mirtazapine  7.5 mg Oral Nightly    pantoprazole  40 mg Oral QAM AC    tamsulosin  0.4 mg Oral Daily    sodium chloride flush  10 mL IntraVENous 2 times per day    polyvinyl alcohol  1 drop Both Eyes TID     PRN Meds: potassium chloride **OR** potassium alternative oral replacement **OR** potassium chloride, melatonin, sodium chloride flush, sodium chloride, promethazine **OR** ondansetron, polyethylene glycol, acetaminophen **OR** acetaminophen, perflutren lipid microspheres    I/O    Intake/Output Summary (Last 24 hours) at 7/8/2022 1228  Last data filed at 7/8/2022 0041  Gross per 24 hour   Intake 120 ml   Output 100 ml   Net 20 ml       Labs:   No results for input(s): WBC, HGB, HCT, PLT in the last 72 hours. No results for input(s): NA, K, CL, CO2, BUN, CREATININE, CALCIUM, PHOS in the last 72 hours. Invalid input(s): MAGNES    No results for input(s): PROT, ALB, ALKPHOS, ALT, AST, BILITOT, AMYLASE, LIPASE in the last 72 hours. No results for input(s): INR in the last 72 hours. No results for input(s): Prentis Revels in the last 72 hours. Chronic labs:  Lab Results   Component Value Date    CHOL 69 06/21/2022    TRIG 73 06/21/2022    HDL 28 (A) 06/21/2022    LDLCALC 26 06/21/2022    INR 1.6 10/08/2021    LABA1C 6.8 06/21/2022       Radiology:  Imaging studies reviewed today. ASSESSMENT:  Acute metabolic encephalopathy  Atrial fibrillation-rate controlled  Elevated troponin, unlikely ACS  COPD  DM2  HTN  HLD  CKD  Hypokalemia    PLAN:    Hospice evaluation appreciated. Patient's son Sara Covington at this time would like to hold off on hospice  PT/OT evaluation appreciated  Awaiting rehab placement. Medically stable. Continue present management with Lopressor twice daily/Remeron/Protonix/Flomax/Cymbalta/cetirizine/Flonase  Continue IV hydration. Potassium supplemented. DVT prophylaxis SCD    Disposition-awaiting placement      Diet: ADULT DIET;  Regular  Code Status: DNR-CC  PT/OT Eval Status:   ordered  DVT Prophylaxis:   scd  Recommended disposition at discharge:   Skilled nursing facility    +++++++++++++++++++++++++++++++++++++++++++++++++  Tammi Childers MD   [facility].  +++++++++++++++++++++++++++++++++++++++++++++++++  NOTE: This report was transcribed using voice recognition software. Every effort was made to ensure accuracy; however, inadvertent computerized transcription errors may be present.

## 2022-07-08 NOTE — CARE COORDINATION
Per Chelsy Robledo from [facility], precert is still pending. Patient will need a negative Covid-19 test prior to discharge. Hens started and will need to be completed once the discharge order goes in. Will determine mode of transportation closer to discharge. Ambulette form in envelope in soft chart.   Jaya Sood RN CM  [phone]

## 2022-07-08 NOTE — PROGRESS NOTES
Comprehensive Nutrition Assessment    Type and Reason for Visit:  Initial,Consult,Wound    Nutrition Recommendations/Plan:   1. Continue current diet order. Start Glucerna TID to optimize intake and monitor. Malnutrition Assessment:  Malnutrition Status: At risk for malnutrition (Comment) (07/08/22 2312)    Context:  Acute Illness     Findings of the 6 clinical characteristics of malnutrition:  Energy Intake:  75% or less of estimated energy requirements for 7 or more days  Weight Loss:  Unable to assess (2/2 lack of wt hx on file to assess)     Body Fat Loss:  Unable to assess     Muscle Mass Loss:  Unable to assess    Fluid Accumulation:  No significant fluid accumulation     Strength:  Not Performed    Nutrition Assessment:    Pt. presented from nursing home for increasing confusion in setting of newly diagnosed UTI. Noted Hospice was initially consulted however patient's son later requested skilled rehab placement w/ possible transition to hospice. Hx of stomach CA,CAD,HTN,DM. Noted Wound x1(no WC pending at this time) Pt. intake varied. Will start ONS and monitor. Nutrition Related Findings:    AMS, oriented to person, GI WNL, hypokalemia +I/O Wound Type:  (wound noted per doc flow- no WC pending at this time)       Current Nutrition Intake & Therapies:    Average Meal Intake: 1-25% (intake varied ; most meals)  Average Supplements Intake: None Ordered  ADULT DIET; Regular  ADULT ORAL NUTRITION SUPPLEMENT; Breakfast, Lunch, Dinner; Diabetic Oral Supplement    Anthropometric Measures:  Height: 5' 10\" (177.8 cm)  Ideal Body Weight (IBW): 166 lbs (75 kg)    Admission Body Weight: 185 lb (83.9 kg) (7/01 est)  Current Body Weight: 185 lb (83.9 kg) (est 7/01), 111.4 % IBW. Weight Source:  (estimated)  Current BMI (kg/m2): 26.5  Usual Body Weight:  (TAVARES d/t lack of wt hx on filet to assess)     Weight Adjustment For: No Adjustment                 BMI Categories: Overweight (BMI 25.0-29. 9)    Estimated Daily Nutrient Needs:  Energy Requirements Based On: Kcal/kg     Energy (kcal/day): 1600-2100kcal (20-25kcal/kg)  Weight Used for Protein Requirements: Ideal  Protein (g/day): 98-113g (1.3-1.5g/kgIBW)  Method Used for Fluid Requirements: 1 ml/kcal  Fluid (ml/day): 1611-9913    Nutrition Diagnosis:   · Inadequate oral intake related to cognitive or neurological impairment as evidenced by poor intake prior to admission,intake 0-25%      Nutrition Interventions:   Food and/or Nutrient Delivery: Continue Current Diet,Start Oral Nutrition Supplement (Glucerna TID)  Nutrition Education/Counseling: Education not indicated          Goals:     Goals: PO intake 50% or greater,by next RD assessment       Nutrition Monitoring and Evaluation:   Behavioral-Environmental Outcomes: None Identified  Food/Nutrient Intake Outcomes: Supplement Intake,Food and Nutrient Intake  Physical Signs/Symptoms Outcomes: Biochemical Data,GI Status,Fluid Status or Edema,Nutrition Focused Physical Findings,Skin,Weight    Discharge Planning:     Too soon to determine     Wilson Laboy RD  Contact: ext [phone]

## 2022-07-09 NOTE — PLAN OF CARE
Problem: Safety - Adult  Goal: Free from fall injury  Outcome: Progressing  Flowsheets (Taken 7/9/2022 0012)  Free From Fall Injury: Instruct family/caregiver on patient safety     Problem: ABCDS Injury Assessment  Goal: Absence of physical injury  Outcome: Progressing  Flowsheets (Taken 7/9/2022 0012)  Absence of Physical Injury: Implement safety measures based on patient assessment     Problem: Skin/Tissue Integrity  Goal: Absence of new skin breakdown  Description: 1. Monitor for areas of redness and/or skin breakdown  2. Assess vascular access sites hourly  3. Every 4-6 hours minimum:  Change oxygen saturation probe site  4. Every 4-6 hours:  If on nasal continuous positive airway pressure, respiratory therapy assess nares and determine need for appliance change or resting period.   Outcome: Progressing

## 2022-07-09 NOTE — PROGRESS NOTES
Hospitalist Progress Note      SYNOPSIS: Patient admitted on 2022 for increasing confusion, found to have urinary tract infection patient started on IV antibiotics. Patient was exposed to [place] from the roommate, he was moved to a separate room and he had a COVID test done today which was negative, he denies any fever or chills. SUBJECTIVE:    Patient seen and examined  Records reviewed. Patient is sitting up in the bed, dozing off but easily arousable. Requested his urinal.  Denies any fever or chills denies any pain. Stable overnight. No other overnight issues reported. Temp (24hrs), Av.3 °F (36.3 °C), Min:96.8 °F (36 °C), Max:97.7 °F (36.5 °C)    DIET: ADULT DIET; Regular  ADULT ORAL NUTRITION SUPPLEMENT; Breakfast, Lunch, Dinner; Diabetic Oral Supplement  CODE: DNR-CC  No intake or output data in the 24 hours ending 22 1443    OBJECTIVE:    /81   Pulse 96   Temp 97.7 °F (36.5 °C) (Temporal)   Resp 16   Ht 5' 10\" (1.778 m)   Wt 185 lb (83.9 kg)   SpO2 94%   BMI 26.54 kg/m²     General appearance: No apparent distress, appears stated age and cooperative. HEENT:  Conjunctivae/corneas clear. Neck: Supple. No jugular venous distention. Respiratory: Clear to auscultation bilaterally, normal respiratory effort  Cardiovascular: Irregularly irregular rate and rhythm, normal S1-S2  Abdomen: Soft, nontender, nondistended  Musculoskeletal: No clubbing, cyanosis, no bilateral lower extremity edema. Brisk capillary refill.    Skin:  No rashes  on visible skin  Neurologic: awake, alert and following commands     ASSESSMENT:  Acute metabolic encephalopathy  Atrial fibrillation with controlled ventricular response  History of COPD no exacerbation  Chronic kidney disease  Hypertension       PLAN:  We will order repeat urine culture  Family requested inpatient consult for hospice  Will require return to skilled care awaiting pre-CERT      DISPOSITION:     Medications:  REVIEWED DAILY    Infusion Medications    sodium chloride      sodium chloride 1,000 mL (07/01/22 2237)     Scheduled Medications    DULoxetine  30 mg Oral Daily    fluticasone  1 spray Each Nostril Daily    cetirizine  10 mg Oral Daily    [Held by provider] metFORMIN  500 mg Oral BID WC    metoprolol  50 mg Oral BID    mirtazapine  7.5 mg Oral Nightly    pantoprazole  40 mg Oral QAM AC    tamsulosin  0.4 mg Oral Daily    sodium chloride flush  10 mL IntraVENous 2 times per day    polyvinyl alcohol  1 drop Both Eyes TID     PRN Meds: potassium chloride **OR** potassium alternative oral replacement **OR** potassium chloride, melatonin, sodium chloride flush, sodium chloride, promethazine **OR** ondansetron, polyethylene glycol, acetaminophen **OR** acetaminophen, perflutren lipid microspheres    Labs:     No results for input(s): WBC, HGB, HCT, PLT in the last 72 hours. No results for input(s): NA, K, CL, CO2, BUN, CREATININE, CALCIUM, PHOS in the last 72 hours. Invalid input(s): MAGNES    No results for input(s): PROT, ALB, ALKPHOS, ALT, AST, BILITOT, AMYLASE, LIPASE in the last 72 hours. No results for input(s): INR in the last 72 hours. No results for input(s): Sulma Gissell in the last 72 hours. Chronic labs:    Lab Results   Component Value Date    CHOL 69 06/21/2022    TRIG 73 06/21/2022    HDL 28 (A) 06/21/2022    LDLCALC 26 06/21/2022    INR 1.6 10/08/2021    LABA1C 6.8 06/21/2022       Radiology: REVIEWED DAILY    +++++++++++++++++++++++++++++++++++++++++++++++++  Austen Sahni MD  [facility] Physician [address]  +++++++++++++++++++++++++++++++++++++++++++++++++  NOTE: This report was transcribed using voice recognition software. Every effort was made to ensure accuracy; however, inadvertent computerized transcription errors may be present.

## 2022-07-10 NOTE — PROGRESS NOTES
Hospitalist Progress Note      PCP: Val Yoon DO    Date of Admission: 7/1/2022    Hospital Course: 20-year-old male hx of atrial fibrillation, CAD, COPD, DM2, HTN, depression, pulmonary hypertension presented from nursing home with increasing confusion. Patient was noted to have UTI. Empirically treated with Rocephin. However urine cultures were negative. Discussion was had with patient's son by previous provider and plans were made for skilled nursing facility placement with hospice. Subjective:   He feels tired. He states he feels alright. No pain. Medications:  Reviewed    Infusion Medications    sodium chloride      sodium chloride 1,000 mL (07/01/22 2961)     Scheduled Medications    enoxaparin  40 mg SubCUTAneous Daily    DULoxetine  30 mg Oral Daily    fluticasone  1 spray Each Nostril Daily    cetirizine  10 mg Oral Daily    [Held by provider] metFORMIN  500 mg Oral BID WC    metoprolol  50 mg Oral BID    mirtazapine  7.5 mg Oral Nightly    pantoprazole  40 mg Oral QAM AC    tamsulosin  0.4 mg Oral Daily    sodium chloride flush  10 mL IntraVENous 2 times per day    polyvinyl alcohol  1 drop Both Eyes TID     PRN Meds: potassium chloride **OR** potassium alternative oral replacement **OR** potassium chloride, melatonin, sodium chloride flush, sodium chloride, promethazine **OR** ondansetron, polyethylene glycol, acetaminophen **OR** acetaminophen, perflutren lipid microspheres    No intake or output data in the 24 hours ending 07/10/22 1302    Physical Exam Performed:    /86   Pulse 80   Temp 98.1 °F (36.7 °C) (Temporal)   Resp 16   Ht 5' 10\" (1.778 m)   Wt 185 lb (83.9 kg)   SpO2 93%   BMI 26.54 kg/m²     General appearance: No apparent distress, appears stated age and cooperative. HEENT: Pupils equal, round, and reactive to light. Conjunctivae/corneas clear. Neck: Supple, with full range of motion. No jugular venous distention.  Trachea midline. Respiratory:  Normal respiratory effort. Clear to auscultation, bilaterally without Rales/Wheezes/Rhonchi. Cardiovascular: Regular rate and rhythm with normal S1/S2 without murmurs, rubs or gallops. Abdomen: Soft, non-tender, non-distended with normal bowel sounds. Musculoskeletal: No clubbing, cyanosis or edema bilaterally. Full range of motion without deformity. Skin: Skin color, texture, turgor normal.  No rashes or lesions. Neurologic:   grossly non-focal.  Psychiatric: Alert and oriented x 2      Labs:   No results for input(s): WBC, HGB, HCT, PLT in the last 72 hours. No results for input(s): NA, K, CL, CO2, BUN, CREATININE, CALCIUM, PHOS in the last 72 hours. Invalid input(s): MAGNES  No results for input(s): AST, ALT, BILIDIR, BILITOT, ALKPHOS in the last 72 hours. No results for input(s): INR in the last 72 hours. No results for input(s): Adonica Hoose in the last 72 hours. Urinalysis:      Lab Results   Component Value Date/Time    NITRU POSITIVE 07/01/2022 02:20 PM    WBCUA 5-10 07/01/2022 02:20 PM    BACTERIA RARE 07/01/2022 02:20 PM    RBCUA PACKED 07/01/2022 02:20 PM    BLOODU LARGE 07/01/2022 02:20 PM    SPECGRAV 1.025 07/01/2022 02:20 PM    GLUCOSEU Negative 07/01/2022 02:20 PM       Radiology:  No orders to display           Assessment/Plan:    Active Hospital Problems    Diagnosis     Altered mental status [R41.82]      Priority: Medium    UTI (urinary tract infection) [N39.0]      Priority: Medium     Acute metabolic encephalopathy-unclear etiology  -Initially treated for UTI but urine culture negative. -CT head negative  -No pneumonia on chest x-ray  -Continue Remeron  -Avoid sedating medications    Atrial fibrillation  -Cardiology consulted. Patient was taken off Eliquis this admission due to risk of falls. Rate controlled    Elevated troponin-likely demand ischemia  -Troponin levels are stable.     COPD  -Continue inhalers    DM2  -Glucose levels have been at

## 2022-07-11 NOTE — PROGRESS NOTES
Hospitalist Progress Note      PCP: Montse Beard DO    Date of Admission: 7/1/2022    Hospital Course: 51-year-old male hx of atrial fibrillation, CAD, COPD, DM2, HTN, depression, pulmonary hypertension presented from nursing home with increasing confusion. Patient was noted to have UTI. Empirically treated with Rocephin. However urine cultures were negative. Discussion was had with patient's son by previous provider and plans were made for skilled nursing facility placement with hospice. Subjective:   He feels better. He is sitting up in chair. He has no complaints      Medications:  Reviewed    Infusion Medications    sodium chloride      sodium chloride 1,000 mL (07/01/22 9851)     Scheduled Medications    enoxaparin  40 mg SubCUTAneous Daily    DULoxetine  30 mg Oral Daily    fluticasone  1 spray Each Nostril Daily    cetirizine  10 mg Oral Daily    [Held by provider] metFORMIN  500 mg Oral BID WC    metoprolol  50 mg Oral BID    mirtazapine  7.5 mg Oral Nightly    pantoprazole  40 mg Oral QAM AC    tamsulosin  0.4 mg Oral Daily    sodium chloride flush  10 mL IntraVENous 2 times per day    polyvinyl alcohol  1 drop Both Eyes TID     PRN Meds: potassium chloride **OR** potassium alternative oral replacement **OR** potassium chloride, melatonin, sodium chloride flush, sodium chloride, promethazine **OR** ondansetron, polyethylene glycol, acetaminophen **OR** acetaminophen, perflutren lipid microspheres      Intake/Output Summary (Last 24 hours) at 7/11/2022 1234  Last data filed at 7/11/2022 0547  Gross per 24 hour   Intake --   Output 100 ml   Net -100 ml       Physical Exam Performed:    BP (!) 142/77   Pulse 100   Temp 97.5 °F (36.4 °C) (Oral)   Resp 18   Ht 5' 10\" (1.778 m)   Wt 185 lb (83.9 kg)   SpO2 96%   BMI 26.54 kg/m²     General appearance: No apparent distress, appears stated age and cooperative. HEENT: Pupils equal, round, and reactive to light. Conjunctivae/corneas clear. Neck: Supple, with full range of motion. No jugular venous distention. Trachea midline. Respiratory:  Normal respiratory effort. Clear to auscultation, bilaterally without Rales/Wheezes/Rhonchi. Cardiovascular: Regular rate and rhythm with normal S1/S2 without murmurs, rubs or gallops. Abdomen: Soft, non-tender, non-distended with normal bowel sounds. Musculoskeletal: No clubbing, cyanosis or edema bilaterally. Full range of motion without deformity. Skin: Skin color, texture, turgor normal.  No rashes or lesions. Neurologic:   grossly non-focal.  Psychiatric: Alert and oriented x 2      Labs:   No results for input(s): WBC, HGB, HCT, PLT in the last 72 hours. No results for input(s): NA, K, CL, CO2, BUN, CREATININE, CALCIUM, PHOS in the last 72 hours. Invalid input(s): MAGNES  No results for input(s): AST, ALT, BILIDIR, BILITOT, ALKPHOS in the last 72 hours. No results for input(s): INR in the last 72 hours. No results for input(s): Sammi Dross in the last 72 hours. Urinalysis:      Lab Results   Component Value Date/Time    NITRU POSITIVE 07/01/2022 02:20 PM    WBCUA 5-10 07/01/2022 02:20 PM    BACTERIA RARE 07/01/2022 02:20 PM    RBCUA PACKED 07/01/2022 02:20 PM    BLOODU LARGE 07/01/2022 02:20 PM    SPECGRAV 1.025 07/01/2022 02:20 PM    GLUCOSEU Negative 07/01/2022 02:20 PM       Radiology:  No orders to display           Assessment/Plan:    Active Hospital Problems    Diagnosis     Altered mental status [R41.82]      Priority: Medium    UTI (urinary tract infection) [N39.0]      Priority: Medium     Acute metabolic encephalopathy-unclear etiology  -Initially treated for UTI but urine culture negative. -CT head negative  -No pneumonia on chest x-ray  -Continue Remeron  -Avoid sedating medications    Atrial fibrillation  -Cardiology consulted. Patient was taken off Eliquis this admission due to risk of falls.   Rate controlled    Elevated troponin-likely demand ischemia  -Troponin levels are stable. COPD  -Continue inhalers    DM2  -Glucose levels have been at goal even holding metformin    HTN  -BP controlled    CKD stage IIIa  -Creatinine stable    Hypokalemia  -Monitor and replete     DVT Prophylaxis: Lovenox  Diet: ADULT DIET;  Regular  ADULT ORAL NUTRITION SUPPLEMENT; Breakfast, Lunch, Dinner; Diabetic Oral Supplement  Code Status: DNR-CC    PT/OT Eval Status: As needed    Dispo -awaiting placement    Noemi King MD

## 2022-07-11 NOTE — PLAN OF CARE
Problem: Safety - Adult  Goal: Free from fall injury  7/11/2022 0211 by Shannon Tse, RN  Outcome: Progressing

## 2022-07-11 NOTE — DISCHARGE INSTR - COC
Chronic depression F32. A    UTI (urinary tract infection) N39.0    Altered mental status R41.82       Isolation/Infection:   Isolation            Droplet Plus          Patient Infection Status       Infection Onset Added Last Indicated Last Indicated By Review Planned Expiration Resolved Resolved By    None active    Resolved    COVID-19 (Rule Out) 04/18/22 04/18/22 04/18/22 COVID-19 & Influenza Combo (Ordered)   04/18/22 Rule-Out Test Resulted            Nurse Assessment:  Last Vital Signs: BP (!) 142/77   Pulse 100   Temp 97.5 °F (36.4 °C) (Oral)   Resp 18   Ht 5' 10\" (1.778 m)   Wt 185 lb (83.9 kg)   SpO2 96%   BMI 26.54 kg/m²     Last documented pain score (0-10 scale):    Last Weight:   Wt Readings from Last 1 Encounters:   07/01/22 185 lb (83.9 kg)     Mental Status:  oriented, alert, and person and place    IV Access:  - None    Nursing Mobility/ADLs:  Walking   Assisted  Transfer  Assisted  Bathing  Assisted  Dressing  Assisted  Toileting  Assisted  Feeding  Assisted  Med Admin  Assisted  Med Delivery   whole    Wound Care Documentation and Therapy:  Wound 07/05/22 Elbow Left;Posterior (Active)   Dressing/Treatment Dry dressing 07/11/22 0800   Wound Assessment [facility] 07/05/22 0027   Drainage Amount None 07/11/22 0800   Number of days: 6       Incision 02/20/20 Head Left;Upper (Active)   Number of days: 872       Incision 02/20/20 Chest Left;Upper (Active)   Number of days: 872        Elimination:  Continence: Bowel: Yes   Bladder: Yes  Urinary Catheter: None   Colostomy/Ileostomy/Ileal Conduit: No       Date of Last BM: unknown    Intake/Output Summary (Last 24 hours) at 7/11/2022 1245  Last data filed at 7/11/2022 0547  Gross per 24 hour   Intake --   Output 100 ml   Net -100 ml     I/O last 3 completed shifts:  In: -   Out: 100 [Urine:100]    Safety Concerns:      At Risk for Falls    Impairments/Disabilities:      None    Nutrition Therapy:  Current Nutrition Therapy:   - Oral Diet:  General easy to chew with diabetic oral supplements TID    Routes of Feeding: Oral  Liquids: Thin Liquids  Daily Fluid Restriction: no  Last Modified Barium Swallow with Video (Video Swallowing Test): not done    Treatments at the Time of Hospital Discharge:   Respiratory Treatments: none  Oxygen Therapy:  is not on home oxygen therapy. Ventilator:    - No ventilator support    Rehab Therapies: Physical Therapy and Occupational Therapy  Weight Bearing Status/Restrictions: No weight bearing restrictions  Other Medical Equipment (for information only, NOT a DME order):  wheelchair and walker  Other Treatments: none    Patient's personal belongings (please select all that are sent with patient):  None    RN SIGNATURE:  Electronically signed by Angelina Lu RN on 7/13/22 at 2:24 PM EDT    CASE MANAGEMENT/SOCIAL WORK SECTION    Inpatient Status Date: 07/01/2022    Readmission Risk Assessment Score:  Readmission Risk              Risk of Unplanned Readmission:  13           Discharging to Facility/ Agency   Name: [facility]  Address: [address] [place] [address]  Phone: [phone]  Fax: [phone]    Dialysis Facility (if applicable)   Name:  Address:  Dialysis Schedule:  Phone:  Fax:    / signature: Electronically signed by Arielle Coelho RN on 7/11/22 at 12:46 PM EDT    PHYSICIAN SECTION    Prognosis: {Prognosis:0177967741}    Condition at Discharge: [address] Patient Condition:[id number]}    Rehab Potential (if transferring to Rehab): {Prognosis:0109821226}    Recommended Labs or Other Treatments After Discharge: ***    Physician Certification: I certify the above information and transfer of Deyanira Arriaga  is necessary for the continuing treatment of the diagnosis listed and that he requires [facility] for less 30 days.      Update Admission H&P: {CHP DME Changes in GJDLJ:505510300}    PHYSICIAN SIGNATURE:  {Esignature:[id number]}

## 2022-07-11 NOTE — PLAN OF CARE
Problem: Safety - Adult  Goal: Free from fall injury  Outcome: Progressing     Problem: ABCDS Injury Assessment  Goal: Absence of physical injury  Outcome: Progressing  Flowsheets (Taken 7/11/2022 0208)  Absence of Physical Injury: Implement safety measures based on patient assessment     Problem: Skin/Tissue Integrity  Goal: Absence of new skin breakdown  Outcome: Progressing     Problem: Chronic Conditions and Co-morbidities  Goal: Patient's chronic conditions and co-morbidity symptoms are monitored and maintained or improved  Outcome: Progressing  Flowsheets (Taken 7/11/2022 0151)  Care Plan - Patient's Chronic Conditions and Co-Morbidity Symptoms are Monitored and Maintained or Improved: Monitor and assess patient's chronic conditions and comorbid symptoms for stability, deterioration, or improvement     Problem: Pain  Goal: Verbalizes/displays adequate comfort level or baseline comfort level  Outcome: Progressing     Problem: Nutrition Deficit:  Goal: Optimize nutritional status  Outcome: Progressing

## 2022-07-11 NOTE — CARE COORDINATION
7/11 Update CM note. Per Leila Moore at [facility] this AM, still no approval from insurance. She placed call to [facility] and they stated patient's case was not yet reviewed and there were 60 cases ahead of his. Patient currently in droplet plus isolation as he was exposed to COVID-19 by his room mate. Patient tested negative on 7/8. HENS will need completed when discharge order is placed. Per documentation, patient's son does not want hospice services at this time and would like to try patient skilled and then return to [facility] as he is a bedhold. Patient will require negative COVID test on day of discharge. LISY completed. Ambulette form in soft chart.     TERESA Lara, RN  PH[facility]ICIANS [facility] SURGICAL [facility] Case Management   Cell: [phone]

## 2022-07-12 NOTE — PROGRESS NOTES
Hospitalist Progress Note      PCP: Alvin Euceda DO    Date of Admission: 7/1/2022    Hospital Course: 79-year-old male hx of atrial fibrillation, CAD, COPD, DM2, HTN, depression, pulmonary hypertension presented from nursing home with increasing confusion. Patient was noted to have UTI. Empirically treated with Rocephin. However urine cultures were negative. Discussion was had with patient's son by previous provider and plans were made for skilled nursing facility placement with hospice. Subjective:   He feels better. He is cheerful and eating lunch. Medications:  Reviewed    Infusion Medications    sodium chloride      sodium chloride 1,000 mL (07/01/22 9112)     Scheduled Medications    enoxaparin  40 mg SubCUTAneous Daily    DULoxetine  30 mg Oral Daily    fluticasone  1 spray Each Nostril Daily    cetirizine  10 mg Oral Daily    metFORMIN  500 mg Oral BID WC    metoprolol  50 mg Oral BID    mirtazapine  7.5 mg Oral Nightly    pantoprazole  40 mg Oral QAM AC    tamsulosin  0.4 mg Oral Daily    sodium chloride flush  10 mL IntraVENous 2 times per day    polyvinyl alcohol  1 drop Both Eyes TID     PRN Meds: potassium chloride **OR** potassium alternative oral replacement **OR** potassium chloride, melatonin, sodium chloride flush, sodium chloride, promethazine **OR** ondansetron, polyethylene glycol, acetaminophen **OR** acetaminophen, perflutren lipid microspheres      Intake/Output Summary (Last 24 hours) at 7/12/2022 1404  Last data filed at 7/12/2022 1138  Gross per 24 hour   Intake 240 ml   Output --   Net 240 ml       Physical Exam Performed:    BP (!) 142/87   Pulse 89   Temp 97.8 °F (36.6 °C) (Axillary)   Resp 16   Ht 5' 10\" (1.778 m)   Wt 185 lb (83.9 kg)   SpO2 96%   BMI 26.54 kg/m²     General appearance: No apparent distress, appears stated age and cooperative. HEENT: Pupils equal, round, and reactive to light. Conjunctivae/corneas clear.   Neck: Supple, with full range of motion. No jugular venous distention. Trachea midline. Respiratory:  Normal respiratory effort. Clear to auscultation, bilaterally without Rales/Wheezes/Rhonchi. Cardiovascular: Regular rate and rhythm with normal S1/S2 without murmurs, rubs or gallops. Abdomen: Soft, non-tender, non-distended with normal bowel sounds. Musculoskeletal: No clubbing, cyanosis or edema bilaterally. Full range of motion without deformity. Skin: Skin color, texture, turgor normal.  No rashes or lesions. Neurologic:   grossly non-focal.  Psychiatric: Alert and oriented x 2      Labs:   No results for input(s): WBC, HGB, HCT, PLT in the last 72 hours. No results for input(s): NA, K, CL, CO2, BUN, CREATININE, CALCIUM, PHOS in the last 72 hours. Invalid input(s): MAGNES  No results for input(s): AST, ALT, BILIDIR, BILITOT, ALKPHOS in the last 72 hours. No results for input(s): INR in the last 72 hours. No results for input(s): Miguelina Gazella in the last 72 hours. Urinalysis:      Lab Results   Component Value Date/Time    NITRU POSITIVE 07/01/2022 02:20 PM    WBCUA 5-10 07/01/2022 02:20 PM    BACTERIA RARE 07/01/2022 02:20 PM    RBCUA PACKED 07/01/2022 02:20 PM    BLOODU LARGE 07/01/2022 02:20 PM    SPECGRAV 1.025 07/01/2022 02:20 PM    GLUCOSEU Negative 07/01/2022 02:20 PM       Radiology:  No orders to display           Assessment/Plan:    Active Hospital Problems    Diagnosis     Altered mental status [R41.82]      Priority: Medium    UTI (urinary tract infection) [N39.0]      Priority: Medium     Acute metabolic encephalopathy-unclear etiology  -Initially treated for UTI but urine culture negative. -CT head negative  -No pneumonia on chest x-ray  -Continue Remeron  -Avoid sedating medications  -Family want SNF. Hospice plans abandoned    Atrial fibrillation  -Cardiology consulted. Patient was taken off Eliquis this admission due to risk of falls.   Rate controlled    Elevated troponin-likely demand ischemia  -Troponin levels are stable. COPD  -Continue inhalers    DM2  -Hyperglycemia,resume metformin and start coverage insulin. HTN  -BP controlled    CKD stage IIIa  -Creatinine stable    Hypokalemia  -Monitor and replete     DVT Prophylaxis: Lovenox  Diet: ADULT ORAL NUTRITION SUPPLEMENT; Breakfast, Lunch, Dinner; Diabetic Oral Supplement  ADULT DIET; Easy [employer]  Code Status: DNR-CC    PT/OT Eval Status: As needed    Dispo -awaiting placement. Hospice depending on dispo.     Bernabe Cota MD

## 2022-07-12 NOTE — PLAN OF CARE
Problem: Safety - Adult  Goal: Free from fall injury  Outcome: Progressing     Problem: ABCDS Injury Assessment  Goal: Absence of physical injury  Outcome: Progressing  Flowsheets (Taken 7/12/2022 0204)  Absence of Physical Injury: Implement safety measures based on patient assessment     Problem: Skin/Tissue Integrity  Goal: Absence of new skin breakdown  7/12/2022 0205 by Angi Breaux RN  Outcome: Progressing  7/11/2022 1206 by Boni Cardona RN  Outcome: Progressing     Problem: Chronic Conditions and Co-morbidities  Goal: Patient's chronic conditions and co-morbidity symptoms are monitored and maintained or improved  Outcome: Progressing  Flowsheets (Taken 7/12/2022 0119)  Care Plan - Patient's Chronic Conditions and Co-Morbidity Symptoms are Monitored and Maintained or Improved: Monitor and assess patient's chronic conditions and comorbid symptoms for stability, deterioration, or improvement     Problem: Pain  Goal: Verbalizes/displays adequate comfort level or baseline comfort level  Outcome: Progressing     Problem: Nutrition Deficit:  Goal: Optimize nutritional status  Outcome: Progressing

## 2022-07-12 NOTE — CARE COORDINATION
7/12 Update CM note. Spoke with Ale Boogie from [facility], pre-cert remains pending at this time. Spoke with patient's son Laura Gloria via phone regarding above and he remains in agreement for [facility] at this time. Received call from Rickey Lamar Dr from [facility] regarding discharge plan; CM updated her on this. Janice Horne informed CM that patient could qualify for long term care under [place] if patient utilized his hospice benefit. CM informed her that plan is to attempt [facility] at this time. If disposition changes and family wants to pursue hospice, Janice Horne would like contacted at [phone] ext. [phone]. She stated patient would require a face-face or virtual visit with provider from [place] before they would sign a hospice authorization form. HENS will need completed when discharge order is placed. Ambulette form in soft chart. Patient will require negative COVID test to be completed on day of discharge. Plan after JAMI is to return to [facility] as patient is a bed hold per previous CM documentation.      TERESA Gore, RN  [facility] SURG[facility] Case Management   Cell: [phone]

## 2022-07-13 NOTE — DISCHARGE SUMMARY
Hospitalist Discharge Summary    Patient ID: Stevenson Farley   Patient : 1931  Patient's PCP: Alvin Euceda DO    Admit Date: 2022   Admitting Physician: Cash Wang DO    Discharge Date:  2022   Discharge Physician: MONTSERRAT Barahona - CNP   Discharge Condition: Stable  Discharge Disposition: [address] [facility] course in brief:  66-year-old male hx of atrial fibrillation, CAD, COPD, DM2, HTN, depression, pulmonary hypertension presented from nursing home with increasing confusion and fatigue. Patient was noted to have UTI. Empirically treated with Rocephin. However urine cultures were negative. Discussion was had with patient's son by previous provider and plans were made for skilled nursing facility placement with hospice. Patient was treated for theurinary tract infection and monitored for continuing confusion. Patient was transferred to hospice and lengthy precert process due to the discussion of transfer to hospice. Patient to discharge to [facility] with [facility] at this time. On date of discharge, patient assessed at bedside, with no medical complaints, telesitter in place and only complaint of being cold. Patient under the impression that he lives in the hospital room that he is currently in and would like to stay there if possible. Patient informed on discharge plans for today and the patient was appreciative of the care. Consults:   IP CONSULT TO INTERNAL MEDICINE  IP CONSULT TO CARDIOLOGY  IP CONSULT TO HOSPICE  IP CONSULT TO DIETITIAN    Discharge Diagnoses: Altered mental status [R41.82]         Priority: Medium    UTI (urinary tract infection) [N39.0]         Priority: Medium      Acute metabolic encephalopathy-unclear etiology     Atrial fibrillation    Elevated troponin-likely demand ischemia     COPD     DM2     HTN    CKD stage IIIa     Hypokalemia           Discharge Instructions / Follow up:   Follow-up with PCP within 1 week of discharge. Follow-up with consultants as indicated by them. Compliance with medications as prescribed on discharge. No future appointments. The patient's condition is stable. At this time the patient is without objective evidence of an acute process requiring continuing hospitalization or inpatient management. They are stable for discharge with outpatient follow-up. I have spoken with the patient and discussed the results of the current hospitalization, in addition to providing specific details for the plan of care and counseling regarding the diagnosis and prognosis. The plan has been discussed in detail and they are aware of the specific conditions for emergent return, as well as the importance of follow-up. Their questions are answered at this time and they are agreeable with the plan for discharge to [facility]. Continued appropriate risk factor modification of blood pressure, diabetes and serum lipids will remain essential to reducing risk of future atherosclerotic development    Activity: activity as tolerated    Physical exam:  General appearance: No apparent distress, appears stated age and cooperative. HEENT: Conjunctivae/corneas clear. Mucous membranes moist.  Neck: Supple. No JVD. Respiratory:  Clear to auscultation bilaterally. Normal respiratory effort. Cardiovascular:  RRR. S1, S2 without MRG. PV: Pulses palpable. No edema. Abdomen: Soft, non-tender, non-distended. +BS  Musculoskeletal: No obvious deformities. Skin: Normal skin color. No rashes or lesions. Good turgor. Neurologic:  Grossly non-focal. Awake, alert, following commands. Psychiatric: Alert and oriented, thought content appropriate, normal insight and judgement    Significant labs:  CBC:   No results for input(s): WBC, RBC, HGB, HCT, MCV, RDW, PLT in the last 72 hours. BMP: No results for input(s): NA, K, CL, CO2, BUN, CREATININE, CA, MG, PHOS in the last 72 hours.   LFT:  No results for input(s): PROT, left ventricular hypertrophy. Normal left ventricle size and systolic function. Indeterminate diastolic function. No wall motion abnormalities. Ejection fraction is visually estimated at 60%. Right Ventricle  Normal right ventricle structure and function. Left Atrium  Severely dilated left atrium. Right Atrium  Severe dilated right atrium. Mitral Valve  Mild mitral annular calcification. Focally calcified mitral valve leaflets. Mild mitral regurgitation. Tricuspid Valve  Moderate tricuspid regurgitation. RVSP is 49 mmHg. Aortic Valve  Focal calcification of the aortic valve  Trileaflet aortic valve  No hemodynamically significant aortic stenosis is present. No evidence of aortic valve regurgitation   Pulmonic Valve  The pulmonic valve was not well visualized. Trace pulmonic regurgitation. Pericardial Effusion  No evidence of pericardial effusion. Aorta  Aortic root dimension within normal limits. Miscellaneous  Inferior Vena Cava not well visualized. Conclusions   Summary  Mild concentric left ventricular hypertrophy. Normal left ventricle size and systolic function. Severely dilated left atrium. Severe dilated right atrium. Mild mitral regurgitation. Moderate tricuspid regurgitation. RVSP is 49 mmHg.    Signature   ----------------------------------------------------------------  Electronically signed by Nellie Jackman DO (Interpreting  physician) on 07/03/2022 07:03 PM  ----------------------------------------------------------------  M-Mode/2D Measurements & Calculations   LV Diastolic    LV Systolic Dimension: 2.8  AV Cusp Separation: 2.1 cmLA  Dimension: 4.5  cm                          Dimension: 5.2 cmAO Root  cm              LV Volume Diastolic: 93 ml  Dimension: 3.1 cm  LV FS:37.8 %    LV Volume Systolic: 64.1 ml  LV PW           LV EDV/LV EDV Index: 93  Diastolic: 1.3  HN/66 IW/K^9KR ESV/LV ESV  cm              Index: 29.1 ml/14ml/ m^2    RV Diastolic Dimension: 3.7 cm  LV PW Systolic: EF Calculated: 41.6 %  2.2 cm          LV Mass Index: 116          LA/Aorta: 1.68  Septum          l/min*m^2                   Ascending Aorta: 2.7 cm  Diastolic: 1.4  LV Length: 7.6 cm           LA volume/Index: 137.8 ml  cm                                          /58.23ml/m^2  Septum          LVOT: 2 cm                  RA Area: 97.0 cm^2  Systolic: 1.8  cm   LV Mass: 235.33  g  Doppler Measurements & Calculations   MV Peak E-Wave: 1.2  AV Peak Velocity: 1.26 LVOT Peak Velocity: 0.84 m/s  m/s                  m/s                    LVOT Mean Velocity: 0.54 m/s  MV Peak A-Wave: 0.53 AV Peak Gradient: 6.31 LVOT Peak Gradient: 2.8  m/s                  mmHg                   mmHgLVOT Mean Gradient: 1.3  MV E/A Ratio: 2.25   AV Mean Velocity: 0.76 mmHg  MV Peak Gradient:    m/s  6.5 mmHg             AV Mean Gradient: 2.7  MV Mean Gradient:    mmHg  2.6 mmHg             AV VTI: 21.5 cm        TR Velocity:3.2 m/s  MV Mean Velocity:    AV Area                TR Gradient:40.91 mmHg  0.74 m/s             (Continuity):2.66 cm^2 PV Peak Velocity: 0.66 m/s  MV Deceleration                             PV Peak Gradient: 1.72 mmHg  Time: 130.6 msec     LVOT VTI: 18.2 cm      PV Mean Velocity: 0.44 m/s  MV P1/2t: 44.8 msec  IVRT: 69.2 msec        PV Mean Gradient: 0.9 mmHg  MVA by PHT:4.91 cm^2  MV Area              Pulm. Vein D  (continuity): 2.3    Velocity:0.63 m/s  cm^2                 Pulm. Vein S Velocity:  MV E' Septal         0.29 m/s  Velocity: 0.09 m/s  MV E' Lateral  Velocity: 17 m/s  [email or web address] [id number]    CT HEAD WO CONTRAST    Result Date: 7/1/2022  EXAMINATION: CT OF THE HEAD WITHOUT CONTRAST  7/1/2022 12:16 pm TECHNIQUE: CT of the head was performed without the administration of intravenous contrast. Automated exposure control, iterative reconstruction, and/or weight based elevated hemidiaphragm. Small bilateral pleural effusions with compressive atelectasis. Discharge Medications:      Medication List      CHANGE how you take these medications    amLODIPine 2.5 MG tablet  Commonly known as: NORVASC  Take 1 tablet by mouth daily  What changed: how much to take     insulin 70-30 (70-30) 100 UNIT per ML injection vial  Commonly known as: NovoLIN 70/30  Take 16 units with breakfast and 10 units with dinner. What changed:   · how much to take  · additional instructions        CONTINUE taking these medications    acetaminophen 325 MG tablet  Commonly known as: TYLENOL     atorvastatin 10 MG tablet  Commonly known as: LIPITOR     bacitracin 500 UNIT/GM ointment  Apply topically 2 times daily. Biofreeze Roll-On 4 % Gel  Generic drug: Menthol (Topical Analgesic)     budesonide 0.5 MG/2ML nebulizer suspension  Commonly known as: PULMICORT  USE 1 UNIT DOSE VIA NEBULIZER TWICE DAILY. diclofenac 50 MG EC tablet  Commonly known as: VOLTAREN  Take 1 tablet by mouth 2 times daily as needed for Pain     DULoxetine 30 MG extended release capsule  Commonly known as: CYMBALTA  TAKE 1 CAPSULE BY MOUTH ONCE A DAY.      Eliquis 5 MG Tabs tablet  Generic drug: apixaban     fluticasone 50 MCG/ACT nasal spray  Commonly known as: Flonase  1 spray by Each Nostril route daily     ipratropium-albuterol 0.5-2.5 (3) MG/3ML Soln nebulizer solution  Commonly known as: DUONEB  USE 1 VIAL IN NEBULIZER 3 TIMES DAILY     LASIX PO     lisinopril 20 MG tablet  Commonly known as: PRINIVIL;ZESTRIL     loratadine 10 MG tablet  Commonly known as: Claritin  Take 1 tablet by mouth daily     meclizine 12.5 MG tablet  Commonly known as: ANTIVERT     melatonin 3 MG Tabs tablet     metFORMIN 500 MG tablet  Commonly known as: GLUCOPHAGE     metoprolol 100 MG tablet  Commonly known as: LOPRESSOR     MILK OF MAGNESIA PO     mirtazapine 7.5 MG tablet  Commonly known as: REMERON  Take 1 tablet by mouth nightly pantoprazole sodium 40 MG Pack packet  Commonly known as: PROTONIX     senna 8.6 MG Tabs tablet  Commonly known as: SENOKOT  TAKE 2 TABLET BY MOUTH AT BEDTIME FOR CONSTIPATION     tamsulosin 0.4 MG capsule  Commonly known as: FLOMAX  Take 1 capsule by mouth daily     traMADol 50 MG tablet  Commonly known as: ULTRAM     TUSSIN DM PO            Time Spent on discharge is more than 45 minutes in the examination, evaluation, counseling and review of medications and discharge plan.    +++++++++++++++++++++++++++++++++++++++++++++++++  Sim Perez, [address], [place]  +++++++++++++++++++++++++++++++++++++++++++++++++  NOTE: This report was transcribed using voice recognition software. Every effort was made to ensure accuracy; however, inadvertent computerized transcription errors may be present.

## 2022-07-13 NOTE — CARE COORDINATION
7/13 Update CM note. Received message from Guillaume Rodriguez at [facility], insurance authorization received. Discharge order obtained. HENS completed. COVID test ordered and given to bedside RN. Spoke with Pattie Lovell at [facility], wheelchair transport arranged for 2:30 PM. Guillaume Rodriguez, bedside RN, and patient's daughter in law Joana Harvey (Km's wife) notified via phone of discharge and transport time and she is in agreement. Completed ambulette form in soft chart. LISY completed.     VEL CrouchN, RN  [facility] Case Management   Cell: [phone]

## 2022-07-13 NOTE — PLAN OF CARE
Problem: Safety - Adult  Goal: Free from fall injury  7/13/2022 1427 by Lesli Longo RN  Outcome: Completed  7/13/2022 0131 by Alayna Castle RN  Outcome: Progressing  Flowsheets (Taken 7/13/2022 0130)  Free From Fall Injury: Instruct family/caregiver on patient safety     Problem: ABCDS Injury Assessment  Goal: Absence of physical injury  7/13/2022 1427 by Lesli Longo RN  Outcome: Completed  7/13/2022 0131 by Alayna Castle RN  Outcome: Progressing  Flowsheets (Taken 7/13/2022 0130)  Absence of Physical Injury: Implement safety measures based on patient assessment     Problem: Skin/Tissue Integrity  Goal: Absence of new skin breakdown  Description: 1. Monitor for areas of redness and/or skin breakdown  2. Assess vascular access sites hourly  3. Every 4-6 hours minimum:  Change oxygen saturation probe site  4. Every 4-6 hours:  If on nasal continuous positive airway pressure, respiratory therapy assess nares and determine need for appliance change or resting period.   7/13/2022 1427 by Lesli Longo RN  Outcome: Completed  7/13/2022 0131 by Alayna Castle RN  Outcome: Progressing     Problem: Chronic Conditions and Co-morbidities  Goal: Patient's chronic conditions and co-morbidity symptoms are monitored and maintained or improved  Outcome: Completed     Problem: Pain  Goal: Verbalizes/displays adequate comfort level or baseline comfort level  7/13/2022 1427 by Lesli Longo RN  Outcome: Completed  7/13/2022 0131 by Alayna Castle RN  Outcome: Progressing     Problem: Nutrition Deficit:  Goal: Optimize nutritional status  7/13/2022 1427 by Lesli Longo RN  Outcome: Completed  7/13/2022 0131 by Alayna Castle RN  Outcome: Progressing

## 2022-07-13 NOTE — PROGRESS NOTES
OT SESSION ATTEMPT     Date:2022  Patient Name: Gilford Girt  MRN: [id number]  : 1931  Room: [address] [place] [address]     Occupational therapy orders received/chart review completed and OT session attempted this date. Pt declined Occupational Therapy  this date stating \" I just need to sleep\" Benefits of participation in therapy reviewed with pt, pt continued to decline therapy with eyes closed, pt very fatigued. Will reattempt OT treatment session at a later time/date.     Stephenie Arias, [address] OTR/L #[id number]

## 2022-07-29 NOTE — TELEPHONE ENCOUNTER
Spoke to the daughter she said they are just getting her father situated at nursing home, and she will have to talk with her family.  She will have to call us in regards to this referral.

## 2022-07-31 PROBLEM — N39.0 UTI (URINARY TRACT INFECTION): Status: RESOLVED | Noted: 2022-01-01 | Resolved: 2022-01-01

## (undated) DEVICE — CLOTH SURG PREP PREOPERATIVE CHLORHEXIDINE GLUC 2% READYPREP

## (undated) DEVICE — MARKER SURG SKIN FULL SZ PUR TRAD INK REGULAR ULTRA FN TWIN

## (undated) DEVICE — PATIENT RETURN ELECTRODE, SINGLE-USE, CONTACT QUALITY MONITORING, ADULT, WITH 9FT CORD, FOR PATIENTS WEIGING OVER 33LBS. (15KG): Brand: MEGADYNE

## (undated) DEVICE — SYRINGE MED 10ML TRNSLUC BRL PLUNG BLK MRK POLYPR CTRL

## (undated) DEVICE — SET INSTRUMENT MINOR PLASTICS

## (undated) DEVICE — INTENDED FOR TISSUE SEPARATION, AND OTHER PROCEDURES THAT REQUIRE A SHARP SURGICAL BLADE TO PUNCTURE OR CUT.: Brand: BARD-PARKER ® STAINLESS STEEL BLADES

## (undated) DEVICE — PAD,NON-ADHERENT,2X3,STERILE,LF,1/PK: Brand: MEDLINE

## (undated) DEVICE — NEEDLE HYPO 27GA L1.25IN GRY POLYPR HUB S STL REG BVL STR

## (undated) DEVICE — 3M™ STERI-STRIP™ REINFORCED ADHESIVE SKIN CLOSURES, R1548, 1 IN X 5 IN (25 MM X 125 MM), 4 STRIPS/ENVELOPE: Brand: 3M™ STERI-STRIP™

## (undated) DEVICE — 3M™ TEGADERM™ TRANSPARENT FILM DRESSING FRAME STYLE, 1626W, 4 IN X 4-3/4 IN (10 CM X 12 CM), 50/CT 4CT/CASE: Brand: 3M™ TEGADERM™

## (undated) DEVICE — MICRODISSECTION NEEDLE STRAIGHT SLEEVE: Brand: COLORADO

## (undated) DEVICE — TOWEL,OR,DSP,ST,BLUE,STD,6/PK,12PK/CS: Brand: MEDLINE

## (undated) DEVICE — SURGICAL PROCEDURE PACK BASIC

## (undated) DEVICE — SURGICAL PROCEDURE PACK EENT CUST

## (undated) DEVICE — DRESSING,GAUZE,XEROFORM,CURAD,5"X9",ST: Brand: CURAD

## (undated) DEVICE — Device

## (undated) DEVICE — STERILE POLYISOPRENE POWDER-FREE SURGICAL GLOVES: Brand: PROTEXIS

## (undated) DEVICE — Z DUP USE 2257490 ADHESIVE SKIN CLSRE 036ML TPCL 2CTL CNCRLTE HIGH VSCSTY DRMB

## (undated) DEVICE — TUBING SUCT 12FR MAL ALUM SHFT FN CAP VENT UNIV CONN W/ OBT

## (undated) DEVICE — TRAY,SKIN SCRUB,DRY,W/GAUZE: Brand: MEDLINE

## (undated) DEVICE — COTTON STRIP: Brand: DEROYAL